# Patient Record
Sex: MALE
[De-identification: names, ages, dates, MRNs, and addresses within clinical notes are randomized per-mention and may not be internally consistent; named-entity substitution may affect disease eponyms.]

---

## 2023-02-03 ENCOUNTER — NURSE TRIAGE (OUTPATIENT)
Dept: OTHER | Facility: CLINIC | Age: 53
End: 2023-02-03

## 2023-02-03 NOTE — TELEPHONE ENCOUNTER
Location of patient: [address] call from [facility] at [facility] with [employer]. Patient wants to establish care at the [facility]. Subjective: Caller states \"I started with this weekend before last. It got worse Thursday. I dismissed it as a run of the mill cold. I have snot in my throat so while I'm talking I get a tickle and have to cough. Wed my gut started slowing down and I feel like I have a big lump in my stomach. Thursday that cleared out. I had a headache especially on Wed and sinus pressure. Sometimes I get some mucous. \"     Current Symptoms: \"worst cold/flu of my life\"    Onset: 10 days ago; rapid    Associated Symptoms:  sinus pressure/pain, cough (sometimes productive) , shortness of breath a couple of times at night last Wed., nasal drainage    Pain Severity: \"not too much today\"    Temperature: denies fever     What has been tried: theraflu, cold-eeze    LMP: NA Pregnant: NA    Recommended disposition: See in Office Today or Tomorrow    As patient is unestablished, advised if no appointment available to be evaluated at urgent care or walk-in clinic but to set up a new patient appointment with scheduling. Care advice provided, patient verbalizes understanding; denies any other questions or concerns; instructed to call back for any new or worsening symptoms. Patient/Caller agrees with recommended disposition; writer provided warm transfer to [facility] at [facility] for appointment scheduling    Attention Provider: Thank you for allowing me to participate in the care of your patient. The patient was connected to triage in response to information provided to the ECC/PSC. Please do not respond through this encounter as the response is not directed to a shared pool.     Reason for Disposition   Sinus congestion (pressure, fullness) present > 10 days    Protocols used: Sinus Pain or Congestion-ADULT-[place]

## 2023-02-07 ENCOUNTER — HOSPITAL ENCOUNTER (OUTPATIENT)
Dept: GENERAL RADIOLOGY | Facility: CLINIC | Age: 53
Discharge: HOME | End: 2023-02-09
Payer: COMMERCIAL

## 2023-02-07 ENCOUNTER — HOSPITAL ENCOUNTER (OUTPATIENT)
Facility: CLINIC | Age: 53
Discharge: HOME | End: 2023-02-09
Payer: COMMERCIAL

## 2023-02-07 ENCOUNTER — OFFICE VISIT (OUTPATIENT)
Dept: INTERNAL MEDICINE CLINIC | Age: 53
End: 2023-02-07
Payer: COMMERCIAL

## 2023-02-07 VITALS
SYSTOLIC BLOOD PRESSURE: 138 MMHG | OXYGEN SATURATION: 96 % | WEIGHT: 233 LBS | DIASTOLIC BLOOD PRESSURE: 86 MMHG | HEART RATE: 104 BPM

## 2023-02-07 DIAGNOSIS — R06.02 SOB (SHORTNESS OF BREATH): ICD-10-CM

## 2023-02-07 DIAGNOSIS — Z13.220 SCREENING FOR HYPERLIPIDEMIA: ICD-10-CM

## 2023-02-07 DIAGNOSIS — J01.90 ACUTE NON-RECURRENT SINUSITIS, UNSPECIFIED LOCATION: Primary | ICD-10-CM

## 2023-02-07 DIAGNOSIS — Z12.11 COLON CANCER SCREENING: ICD-10-CM

## 2023-02-07 PROCEDURE — G8421 BMI NOT CALCULATED: HCPCS | Performed by: INTERNAL MEDICINE

## 2023-02-07 PROCEDURE — 99204 OFFICE O/P NEW MOD 45 MIN: CPT | Performed by: INTERNAL MEDICINE

## 2023-02-07 PROCEDURE — 71046 X-RAY EXAM CHEST 2 VIEWS: CPT

## 2023-02-07 PROCEDURE — 3017F COLORECTAL CA SCREEN DOC REV: CPT | Performed by: INTERNAL MEDICINE

## 2023-02-07 PROCEDURE — G8484 FLU IMMUNIZE NO ADMIN: HCPCS | Performed by: INTERNAL MEDICINE

## 2023-02-07 PROCEDURE — G8427 DOCREV CUR MEDS BY ELIG CLIN: HCPCS | Performed by: INTERNAL MEDICINE

## 2023-02-07 PROCEDURE — 1036F TOBACCO NON-USER: CPT | Performed by: INTERNAL MEDICINE

## 2023-02-07 RX ORDER — AZITHROMYCIN 250 MG/1
TABLET, FILM COATED ORAL
Qty: 1 PACKET | Refills: 0 | Status: SHIPPED | OUTPATIENT
Start: 2023-02-07

## 2023-02-07 ASSESSMENT — ENCOUNTER SYMPTOMS
SINUS PRESSURE: 1
COUGH: 1
SHORTNESS OF BREATH: 1
SINUS COMPLAINT: 1
SPUTUM PRODUCTION: 1

## 2023-02-07 NOTE — PROGRESS NOTES
[address]  [place] [address]  Dept: [phone]  Dept Fax: [phone]    Jackelin Ochoa is a 46 y.o. male who presents today for his medicalconditions/complaints as noted below. Jackelin Ochoa is c/o of New Patient and Sinus Problem (With SOB, having sinus pressure started last year)      HPI:     Shortness of Breath  This is a new problem. The current episode started 1 to 4 weeks ago (about 10 days). The problem has been gradually worsening. Associated symptoms include sputum production. Nothing aggravates the symptoms. He has tried nothing for the symptoms. Sinus Problem  This is a new problem. The current episode started 1 to 4 weeks ago. The problem has been gradually worsening since onset. The pain is moderate. Associated symptoms include congestion, coughing (from tickle in throat occasionally productive), shortness of breath and sinus pressure (greater on left). Past treatments include oral decongestants (theraflu and mucinex). The treatment provided moderate relief. No past medical history on file. Current Outpatient Medications   Medication Sig Dispense Refill    azithromycin (ZITHROMAX) 250 MG tablet Take 2 tabs (500 mg) on Day 1, and take 1 tab (250 mg) on days 2 through 5. 1 packet 0     No current facility-administered medications for this visit.      Allergies   Allergen Reactions    Seasonal        Health Maintenance   Topic Date Due    Depression Screen  Never done    HIV screen  Never done    Hepatitis C screen  Never done    DTaP/Tdap/Td vaccine (1 - Tdap) Never done    Lipids  Never done    Colorectal Cancer Screen  Never done    Shingles vaccine (1 of 2) Never done    Flu vaccine (1) Never done    COVID-19 Vaccine  Completed    Hepatitis A vaccine  Aged Out    Hib vaccine  Aged Out    Meningococcal (ACWY) vaccine  Aged Out    Pneumococcal 0-64 years Vaccine  Aged Out       Subjective:      Review of Systems   HENT:  Positive for congestion and sinus pressure (greater on left). Respiratory:  Positive for cough (from tickle in throat occasionally productive), sputum production and shortness of breath. All other systems reviewed and are negative. Objective:     Physical Exam  Vitals reviewed. Constitutional:       Appearance: He is well-developed. HENT:      Head: Normocephalic and atraumatic. Right Ear: Tympanic membrane normal.      Left Ear: Tympanic membrane normal.      Mouth/Throat:      Mouth: Mucous membranes are moist.      Pharynx: Oropharynx is clear. No oropharyngeal exudate. Eyes:      Conjunctiva/sclera: Conjunctivae normal.      Pupils: Pupils are equal, round, and reactive to light. Neck:      Thyroid: No thyromegaly. Vascular: No JVD. Cardiovascular:      Rate and Rhythm: Normal rate and regular rhythm. Heart sounds: Normal heart sounds. No murmur heard. Pulmonary:      Effort: Pulmonary effort is normal.      Breath sounds: Normal breath sounds. Abdominal:      General: Bowel sounds are normal.      Palpations: Abdomen is soft. Musculoskeletal:         General: Normal range of motion. Cervical back: Normal range of motion and neck supple. Skin:     General: Skin is warm and dry. Neurological:      Mental Status: He is alert and oriented to person, place, and time. Deep Tendon Reflexes: Reflexes are normal and symmetric. /86 (Site: Right Upper Arm, Position: Sitting)   Pulse (!) 104   Wt 233 lb (105.7 kg)   SpO2 96%       Assessment:       Diagnosis Orders   1. Acute non-recurrent sinusitis, unspecified location        2. Screening for hyperlipidemia  Lipid Panel      3. SOB (shortness of breath)  azithromycin (ZITHROMAX) 250 MG tablet    XR CHEST (2 VW)      4. Colon cancer screening  FIT-DNA (Cologuard)          Plan:      No follow-ups on file.     Orders Placed This Encounter   Medications    azithromycin (ZITHROMAX) 250 MG tablet     Sig: Take 2 tabs (500 mg) on Day 1, and take 1 tab (250 mg) on days 2 through 5. Dispense:  1 packet     Refill:  0     Orders Placed This Encounter   Procedures    FIT-DNA (Cologuard)    XR CHEST (2 VW)     Standing Status:   Future     Standing Expiration Date:   2/7/2024    Lipid Panel     Standing Status:   Future     Standing Expiration Date:   2/7/2024     Order Specific Question:   Is Patient Fasting?/# of Hours     Answer:   No            Patient given educational materials - see patient instructions. Discussed use, benefit, and side effects of prescribed medications. All patientquestions answered. Pt voiced understanding.     Electronically signed by Tiffany Talbot MD on 2/7/2023at 10:06 AM

## 2023-02-07 NOTE — PROGRESS NOTES
Visit Information    Have you changed or started any medications since your last visit including any over-the-counter medicines, vitamins, or herbal medicines? no   Are you having any side effects from any of your medications? -  no  Have you stopped taking any of your medications? Is so, why? -  no    Have you seen any other physician or provider since your last visit? No  Have you had any other diagnostic tests since your last visit? No  Have you been seen in the emergency room and/or had an admission to a hospital since we last saw you? No  Have you had your routine dental cleaning in the past 6 months? no    Have you activated your [employer] account? If not, what are your barriers?  No:      Patient Care Team:  Rolanda Curry MD as PCP - General (Internal Medicine)    Medical History Review  Past Medical, Family, and Social History reviewed and does contribute to the patient presenting condition    Health Maintenance   Topic Date Due    Depression Screen  Never done    HIV screen  Never done    Hepatitis C screen  Never done    DTaP/Tdap/Td vaccine (1 - Tdap) Never done    Lipids  Never done    Colorectal Cancer Screen  Never done    Shingles vaccine (1 of 2) Never done    Flu vaccine (1) Never done    COVID-19 Vaccine  Completed    Hepatitis A vaccine  Aged Out    Hib vaccine  Aged Out    Meningococcal (ACWY) vaccine  Aged Out    Pneumococcal 0-64 years Vaccine  Aged Out

## 2023-02-08 DIAGNOSIS — R93.89 ABNORMAL CXR: Primary | ICD-10-CM

## 2023-02-13 ENCOUNTER — HOSPITAL ENCOUNTER (OUTPATIENT)
Dept: GENERAL RADIOLOGY | Facility: CLINIC | Age: 53
Discharge: HOME | DRG: 176 | End: 2023-02-15
Payer: COMMERCIAL

## 2023-02-13 ENCOUNTER — TELEPHONE (OUTPATIENT)
Dept: INTERNAL MEDICINE CLINIC | Age: 53
End: 2023-02-13

## 2023-02-13 ENCOUNTER — HOSPITAL ENCOUNTER (OUTPATIENT)
Facility: CLINIC | Age: 53
Discharge: HOME | DRG: 176 | End: 2023-02-15
Payer: COMMERCIAL

## 2023-02-13 DIAGNOSIS — R06.02 SOB (SHORTNESS OF BREATH): ICD-10-CM

## 2023-02-13 DIAGNOSIS — R06.02 SOB (SHORTNESS OF BREATH): Primary | ICD-10-CM

## 2023-02-13 PROCEDURE — 71046 X-RAY EXAM CHEST 2 VIEWS: CPT

## 2023-02-13 NOTE — TELEPHONE ENCOUNTER
Patient was seen last week for a cold and had a chest x-ray done. He was told to get repeat chest x-ray done in 8 weeks. Patient is stating he is very short of breath when he does things. He would like to know if he can have the x-ray done now. Or what would you like him to do.

## 2023-02-14 ENCOUNTER — APPOINTMENT (OUTPATIENT)
Dept: CT IMAGING | Age: 53
DRG: 176 | End: 2023-02-14
Payer: COMMERCIAL

## 2023-02-14 ENCOUNTER — APPOINTMENT (OUTPATIENT)
Dept: NON INVASIVE DIAGNOSTICS | Age: 53
DRG: 176 | End: 2023-02-14
Payer: COMMERCIAL

## 2023-02-14 ENCOUNTER — APPOINTMENT (OUTPATIENT)
Dept: VASCULAR LAB | Age: 53
DRG: 176 | End: 2023-02-14
Payer: COMMERCIAL

## 2023-02-14 ENCOUNTER — HOSPITAL ENCOUNTER (INPATIENT)
Age: 53
LOS: 1 days | Discharge: HOME | DRG: 176 | End: 2023-02-15
Attending: EMERGENCY MEDICINE | Admitting: INTERNAL MEDICINE
Payer: COMMERCIAL

## 2023-02-14 ENCOUNTER — APPOINTMENT (OUTPATIENT)
Dept: GENERAL RADIOLOGY | Age: 53
DRG: 176 | End: 2023-02-14
Payer: COMMERCIAL

## 2023-02-14 DIAGNOSIS — I82.431 ACUTE DEEP VEIN THROMBOSIS (DVT) OF POPLITEAL VEIN OF RIGHT LOWER EXTREMITY (HCC): ICD-10-CM

## 2023-02-14 DIAGNOSIS — I26.94 MULTIPLE SUBSEGMENTAL PULMONARY EMBOLI WITHOUT ACUTE COR PULMONALE (HCC): Primary | ICD-10-CM

## 2023-02-14 PROBLEM — I26.99 PULMONARY EMBOLISM WITHOUT ACUTE COR PULMONALE, UNSPECIFIED CHRONICITY, UNSPECIFIED PULMONARY EMBOLISM TYPE (HCC): Status: ACTIVE | Noted: 2023-02-14

## 2023-02-14 LAB
ABSOLUTE EOS #: 0 K/UL (ref 0–0.4)
ABSOLUTE LYMPH #: 5.01 K/UL (ref 1–4.8)
ABSOLUTE MONO #: 0.64 K/UL (ref 0.1–1.3)
ANION GAP SERPL CALCULATED.3IONS-SCNC: 10 MMOL/L (ref 9–17)
ATYPICAL LYMPHOCYTE ABSOLUTE COUNT: 0.36 K/UL
ATYPICAL LYMPHOCYTES: 4 %
BACTERIA: NORMAL
BASOPHILS # BLD: 2 % (ref 0–2)
BASOPHILS ABSOLUTE: 0.18 K/UL (ref 0–0.2)
BILIRUBIN URINE: NEGATIVE
BNP SERPL-MCNC: 65 PG/ML
BUN SERPL-MCNC: 11 MG/DL (ref 6–20)
CALCIUM SERPL-MCNC: 8.7 MG/DL (ref 8.6–10.4)
CASTS UA: NORMAL /LPF
CHLORIDE SERPL-SCNC: 96 MMOL/L (ref 98–107)
CO2 SERPL-SCNC: 25 MMOL/L (ref 20–31)
COLOR: YELLOW
CREAT SERPL-MCNC: 0.73 MG/DL (ref 0.7–1.2)
D DIMER BLD IA.RAPID-MCNC: 10.15 MG/L FEU (ref 0–0.59)
EKG ATRIAL RATE: 84 BPM
EKG P AXIS: 33 DEGREES
EKG P-R INTERVAL: 154 MS
EKG Q-T INTERVAL: 374 MS
EKG QRS DURATION: 78 MS
EKG QTC CALCULATION (BAZETT): 441 MS
EKG R AXIS: 36 DEGREES
EKG T AXIS: 42 DEGREES
EKG VENTRICULAR RATE: 84 BPM
EOSINOPHILS RELATIVE PERCENT: 0 % (ref 0–4)
EPITHELIAL CELLS UA: NORMAL /HPF
FLUAV RNA RESP QL NAA+PROBE: NOT DETECTED
FLUBV RNA RESP QL NAA+PROBE: NOT DETECTED
GFR SERPL CREATININE-BSD FRML MDRD: >60 ML/MIN/1.73M2
GLUCOSE SERPL-MCNC: 121 MG/DL (ref 70–99)
GLUCOSE UR STRIP.AUTO-MCNC: NEGATIVE MG/DL
HCT VFR BLD AUTO: 41.1 % (ref 41–53)
HGB BLD-MCNC: 13.9 G/DL (ref 13.5–17.5)
INR PPP: 1
KETONES UR STRIP.AUTO-MCNC: NEGATIVE MG/DL
LEUKOCYTE ESTERASE UR QL STRIP.AUTO: NEGATIVE
LV EF: 55 %
LVEF MODALITY: NORMAL
LYMPHOCYTES # BLD: 55 % (ref 24–44)
MCH RBC QN AUTO: 30.3 PG (ref 26–34)
MCHC RBC AUTO-ENTMCNC: 33.9 G/DL (ref 31–37)
MCV RBC AUTO: 89.5 FL (ref 80–100)
MONOCYTES # BLD: 7 % (ref 1–7)
MORPHOLOGY: ABNORMAL
MYOGLOBIN SERPL-MCNC: 86 NG/ML (ref 28–72)
MYOGLOBIN SERPL-MCNC: 97 NG/ML (ref 28–72)
NITRITE UR QL STRIP.AUTO: NEGATIVE
PARTIAL THROMBOPLASTIN TIME: 27.7 SEC (ref 24–36)
PDW BLD-RTO: 15 % (ref 11.5–14.9)
PLATELET # BLD AUTO: 299 K/UL (ref 150–450)
PMV BLD AUTO: 6.5 FL (ref 6–12)
POTASSIUM SERPL-SCNC: 4.5 MMOL/L (ref 3.7–5.3)
PROT UR STRIP.AUTO-MCNC: 7 MG/DL (ref 5–8)
PROT UR STRIP.AUTO-MCNC: ABNORMAL MG/DL
PROTHROMBIN TIME: 13 SEC (ref 11.8–14.6)
RBC # BLD: 4.59 M/UL (ref 4.5–5.9)
RBC CLUMPS #/AREA URNS AUTO: NORMAL /HPF
SARS-COV-2 RNA RESP QL NAA+PROBE: NOT DETECTED
SEG NEUTROPHILS: 32 % (ref 36–66)
SEGMENTED NEUTROPHILS ABSOLUTE COUNT: 2.91 K/UL (ref 1.3–9.1)
SODIUM SERPL-SCNC: 131 MMOL/L (ref 135–144)
SOURCE: NORMAL
SPECIFIC GRAVITY UA: 1.01 (ref 1–1.03)
SPECIMEN DESCRIPTION: NORMAL
T4 FREE SERPL-MCNC: 1.07 NG/DL (ref 0.93–1.7)
TROPONIN I SERPL DL<=0.01 NG/ML-MCNC: 10 NG/L (ref 0–22)
TROPONIN I SERPL DL<=0.01 NG/ML-MCNC: 11 NG/L (ref 0–22)
TSH SERPL-ACNC: 3.8 UIU/ML (ref 0.3–5)
TURBIDITY: CLEAR
URINE HGB: NEGATIVE
UROBILINOGEN, URINE: NORMAL
WBC # BLD AUTO: 9.1 K/UL (ref 3.5–11)
WBC UA: NORMAL /HPF

## 2023-02-14 PROCEDURE — 80048 BASIC METABOLIC PNL TOTAL CA: CPT

## 2023-02-14 PROCEDURE — 36415 COLL VENOUS BLD VENIPUNCTURE: CPT

## 2023-02-14 PROCEDURE — 85379 FIBRIN DEGRADATION QUANT: CPT

## 2023-02-14 PROCEDURE — 93970 EXTREMITY STUDY: CPT

## 2023-02-14 PROCEDURE — 93306 TTE W/DOPPLER COMPLETE: CPT

## 2023-02-14 PROCEDURE — 71260 CT THORAX DX C+: CPT | Performed by: EMERGENCY MEDICINE

## 2023-02-14 PROCEDURE — 83880 ASSAY OF NATRIURETIC PEPTIDE: CPT

## 2023-02-14 PROCEDURE — 83874 ASSAY OF MYOGLOBIN: CPT

## 2023-02-14 PROCEDURE — 87636 SARSCOV2 & INF A&B AMP PRB: CPT

## 2023-02-14 PROCEDURE — 85025 COMPLETE CBC W/AUTO DIFF WBC: CPT

## 2023-02-14 PROCEDURE — 93010 ELECTROCARDIOGRAM REPORT: CPT | Performed by: INTERNAL MEDICINE

## 2023-02-14 PROCEDURE — 85610 PROTHROMBIN TIME: CPT

## 2023-02-14 PROCEDURE — 99285 EMERGENCY DEPT VISIT HI MDM: CPT

## 2023-02-14 PROCEDURE — 6360000002 HC RX W HCPCS: Performed by: EMERGENCY MEDICINE

## 2023-02-14 PROCEDURE — 2580000003 HC RX 258: Performed by: EMERGENCY MEDICINE

## 2023-02-14 PROCEDURE — 81001 URINALYSIS AUTO W/SCOPE: CPT

## 2023-02-14 PROCEDURE — 85730 THROMBOPLASTIN TIME PARTIAL: CPT

## 2023-02-14 PROCEDURE — 1200000000 HC SEMI PRIVATE

## 2023-02-14 PROCEDURE — 6360000004 HC RX CONTRAST MEDICATION: Performed by: EMERGENCY MEDICINE

## 2023-02-14 PROCEDURE — 6370000000 HC RX 637 (ALT 250 FOR IP)

## 2023-02-14 PROCEDURE — 84443 ASSAY THYROID STIM HORMONE: CPT

## 2023-02-14 PROCEDURE — 93005 ELECTROCARDIOGRAM TRACING: CPT | Performed by: EMERGENCY MEDICINE

## 2023-02-14 PROCEDURE — 2580000003 HC RX 258: Performed by: INTERNAL MEDICINE

## 2023-02-14 PROCEDURE — 84439 ASSAY OF FREE THYROXINE: CPT

## 2023-02-14 PROCEDURE — 71045 X-RAY EXAM CHEST 1 VIEW: CPT

## 2023-02-14 PROCEDURE — 84484 ASSAY OF TROPONIN QUANT: CPT

## 2023-02-14 RX ORDER — MAGNESIUM SULFATE 1 G/100ML
1000 INJECTION INTRAVENOUS PRN
Status: DISCONTINUED | OUTPATIENT
Start: 2023-02-14 | End: 2023-02-15 | Stop reason: HOSPADM

## 2023-02-14 RX ORDER — SODIUM CHLORIDE 0.9 % (FLUSH) 0.9 %
10 SYRINGE (ML) INJECTION PRN
Status: DISCONTINUED | OUTPATIENT
Start: 2023-02-14 | End: 2023-02-15 | Stop reason: HOSPADM

## 2023-02-14 RX ORDER — GUAIFENESIN 600 MG/1
600 TABLET, EXTENDED RELEASE ORAL 2 TIMES DAILY PRN
COMMUNITY

## 2023-02-14 RX ORDER — SODIUM CHLORIDE 9 MG/ML
INJECTION, SOLUTION INTRAVENOUS PRN
Status: DISCONTINUED | OUTPATIENT
Start: 2023-02-14 | End: 2023-02-15 | Stop reason: HOSPADM

## 2023-02-14 RX ORDER — ENOXAPARIN SODIUM 150 MG/ML
1 INJECTION SUBCUTANEOUS ONCE
Status: COMPLETED | OUTPATIENT
Start: 2023-02-14 | End: 2023-02-14

## 2023-02-14 RX ORDER — ACETAMINOPHEN 650 MG/1
650 SUPPOSITORY RECTAL EVERY 6 HOURS PRN
Status: DISCONTINUED | OUTPATIENT
Start: 2023-02-14 | End: 2023-02-15 | Stop reason: HOSPADM

## 2023-02-14 RX ORDER — ACETAMINOPHEN 325 MG/1
650 TABLET ORAL EVERY 6 HOURS PRN
Status: DISCONTINUED | OUTPATIENT
Start: 2023-02-14 | End: 2023-02-15 | Stop reason: HOSPADM

## 2023-02-14 RX ORDER — ONDANSETRON 2 MG/ML
4 INJECTION INTRAMUSCULAR; INTRAVENOUS EVERY 6 HOURS PRN
Status: DISCONTINUED | OUTPATIENT
Start: 2023-02-14 | End: 2023-02-15 | Stop reason: HOSPADM

## 2023-02-14 RX ORDER — POTASSIUM CHLORIDE 7.45 MG/ML
10 INJECTION INTRAVENOUS PRN
Status: DISCONTINUED | OUTPATIENT
Start: 2023-02-14 | End: 2023-02-15 | Stop reason: HOSPADM

## 2023-02-14 RX ORDER — 0.9 % SODIUM CHLORIDE 0.9 %
100 INTRAVENOUS SOLUTION INTRAVENOUS ONCE
Status: COMPLETED | OUTPATIENT
Start: 2023-02-14 | End: 2023-02-14

## 2023-02-14 RX ORDER — ENOXAPARIN SODIUM 150 MG/ML
1 INJECTION SUBCUTANEOUS 2 TIMES DAILY
Status: DISCONTINUED | OUTPATIENT
Start: 2023-02-14 | End: 2023-02-14 | Stop reason: ALTCHOICE

## 2023-02-14 RX ORDER — M-VIT,TX,IRON,MINS/CALC/FOLIC 27MG-0.4MG
1 TABLET ORAL DAILY
COMMUNITY

## 2023-02-14 RX ORDER — POTASSIUM CHLORIDE 20 MEQ/1
40 TABLET, EXTENDED RELEASE ORAL PRN
Status: DISCONTINUED | OUTPATIENT
Start: 2023-02-14 | End: 2023-02-15 | Stop reason: HOSPADM

## 2023-02-14 RX ORDER — ONDANSETRON 4 MG/1
4 TABLET, ORALLY DISINTEGRATING ORAL EVERY 8 HOURS PRN
Status: DISCONTINUED | OUTPATIENT
Start: 2023-02-14 | End: 2023-02-15 | Stop reason: HOSPADM

## 2023-02-14 RX ORDER — POLYETHYLENE GLYCOL 3350 17 G/17G
17 POWDER, FOR SOLUTION ORAL DAILY PRN
Status: DISCONTINUED | OUTPATIENT
Start: 2023-02-14 | End: 2023-02-15 | Stop reason: HOSPADM

## 2023-02-14 RX ORDER — SODIUM CHLORIDE 0.9 % (FLUSH) 0.9 %
5-40 SYRINGE (ML) INJECTION EVERY 12 HOURS SCHEDULED
Status: DISCONTINUED | OUTPATIENT
Start: 2023-02-14 | End: 2023-02-15 | Stop reason: HOSPADM

## 2023-02-14 RX ORDER — 0.9 % SODIUM CHLORIDE 0.9 %
1000 INTRAVENOUS SOLUTION INTRAVENOUS ONCE
Status: COMPLETED | OUTPATIENT
Start: 2023-02-14 | End: 2023-02-14

## 2023-02-14 RX ADMIN — ENOXAPARIN SODIUM 105 MG: 150 INJECTION SUBCUTANEOUS at 10:42

## 2023-02-14 RX ADMIN — SODIUM CHLORIDE 100 ML: 9 INJECTION, SOLUTION INTRAVENOUS at 09:30

## 2023-02-14 RX ADMIN — SODIUM CHLORIDE, PRESERVATIVE FREE 10 ML: 5 INJECTION INTRAVENOUS at 20:09

## 2023-02-14 RX ADMIN — APIXABAN 10 MG: 5 TABLET, FILM COATED ORAL at 20:08

## 2023-02-14 RX ADMIN — SODIUM CHLORIDE, PRESERVATIVE FREE 10 ML: 5 INJECTION INTRAVENOUS at 09:30

## 2023-02-14 RX ADMIN — IOPAMIDOL 75 ML: 755 INJECTION, SOLUTION INTRAVENOUS at 09:29

## 2023-02-14 RX ADMIN — SODIUM CHLORIDE 1000 ML: 9 INJECTION, SOLUTION INTRAVENOUS at 08:36

## 2023-02-14 ASSESSMENT — ENCOUNTER SYMPTOMS
TROUBLE SWALLOWING: 0
CONSTIPATION: 0
SINUS PRESSURE: 0
ABDOMINAL PAIN: 0
WHEEZING: 0
BACK PAIN: 0
BLOOD IN STOOL: 0
COLOR CHANGE: 0
VOMITING: 0
EYE REDNESS: 0
CHEST TIGHTNESS: 0
SHORTNESS OF BREATH: 1
FACIAL SWELLING: 0
DIARRHEA: 0
EYE DISCHARGE: 0
SORE THROAT: 0
COUGH: 0
SHORTNESS OF BREATH: 0
RHINORRHEA: 0
NAUSEA: 1
EYE PAIN: 0

## 2023-02-14 ASSESSMENT — PAIN - FUNCTIONAL ASSESSMENT: PAIN_FUNCTIONAL_ASSESSMENT: 0-10

## 2023-02-14 ASSESSMENT — PAIN SCALES - GENERAL: PAINLEVEL_OUTOF10: 1

## 2023-02-14 NOTE — ED NOTES
Report given to Diana Mcneill RN from Med/Surg. Report method by phone   The following was reviewed with receiving RN:   Current vital signs:  /88   Pulse 88   Temp 97.3 °F (36.3 °C) (Oral)   Resp 28   Ht 5' 10\" (1.778 m)   Wt 233 lb (105.7 kg)   SpO2 93%   BMI 33.43 kg/m²                MEWS Score: 1     Any medication or safety alerts were reviewed. Any pending diagnostics and notifications were also reviewed, as well as any safety concerns or issues, abnormal labs, abnormal imaging, and abnormal assessment findings. Questions were answered.             Desiree Prader, RN  02/14/23 [id number]

## 2023-02-14 NOTE — PROGRESS NOTES
Medication History completed:    New medications: guaifenesin ER, multivitamin    Medications discontinued:  Azithromycin - course completed on 2/12/23    Medications flagged for review: none    Changes to dosing: none    Stated allergies: NKDA    Other pertinent information: Medications confirmed with patient. He denies routine prescription medications.      Thank you,  Johnson Zhou, PharmD, BCPS  [phone]

## 2023-02-14 NOTE — H&P
[address]      [address] [facility]     HISTORY AND PHYSICAL EXAMINATION            Date:   2/14/2023  Patient name:  Jake Fitzgerald  Date of admission:  2/14/2023  7:33 AM  MRN:   [id number]  Account:  [de-identified]  YOB: 1970  PCP:    Sean Bauer MD  Room:   2051/2051-01  Code Status:    Full Code    Chief Complaint:     Chief Complaint   Patient presents with    Nausea     X1 week intermittently      Shortness of Breath     x3 weeks         History Obtained From:     patient    History of Present Illness: The patient is a 80-year-old male with no significant past medical history presenting today for shortness of breath that has been ongoing for the last 3 weeks. Patient stated that he has been having worsening dyspnea. Denies any chest pain. States that he has had episodes where he wakes up from sleep gasping for air. Had to take off work due to worsening dyspnea. Has not seen a PCP in 20 years. He is a non-smoker. Denies any family history of blood clots. No recent history of travel or any surgeries. Denies any current comorbidities. Patient states that he was having worsening congestion and went to outpatient clinic and was given a Z-Jose F. His symptoms continue to worsen, and this morning at 3 AM patient became really diaphoretic and had an episode of vomiting, with worsening dyspnea. In the ED patient was vitally stable. CT of the chest showed bilateral acute PE with no heart strain. As well as right-sided popliteal DVT. Patient was given Lovenox in the ED. Will be started on Eliquis tonight and will have an echo ordered. EKG shows sinus rhythm. Patient is being admitted for the management of PE and right-sided popliteal DVT. Past Medical History:     History reviewed. No pertinent past medical history.      Past SurgicalHistory: History reviewed. No pertinent surgical history. Medications Prior to Admission:        Prior to Admission medications    Medication Sig Start Date End Date Taking? Authorizing Provider   guaiFENesin (MUCINEX) 600 MG extended release tablet Take 600 mg by mouth 2 times daily as needed for Congestion   Yes Historical Provider, MD   Multiple Vitamins-Minerals (THERAPEUTIC MULTIVITAMIN-MINERALS) tablet Take 1 tablet by mouth daily   Yes Historical Provider, MD        Allergies:     Seasonal    Social History:     Tobacco:    reports that he has never smoked. He has never used smokeless tobacco.  Alcohol:      reports that he does not currently use alcohol. Drug Use:  reports that he does not currently use drugs after having used the following drugs: Marijuana Dominique Ambtiarra). Family History:     History reviewed. No pertinent family history. Review of Systems:     Positive and Negative as described in HPI. Review of Systems   Constitutional:  Negative for chills and fever. HENT:  Negative for rhinorrhea and sore throat. Eyes:  Negative for visual disturbance. Respiratory:  Negative for cough and shortness of breath. Cardiovascular:  Negative for chest pain and leg swelling. Gastrointestinal:  Positive for nausea. Negative for abdominal pain and diarrhea. Genitourinary:  Negative for dysuria and hematuria. Musculoskeletal:  Negative for back pain. Skin:  Negative for rash. Neurological:  Positive for headaches. Negative for numbness. Psychiatric/Behavioral:  Negative for agitation. Physical Exam:   BP (!) 133/95   Pulse 88   Temp 97.7 °F (36.5 °C) (Oral)   Resp 20   Ht 5' 10\" (1.778 m)   Wt 233 lb (105.7 kg)   SpO2 97%   BMI 33.43 kg/m²   Temp (24hrs), Av.5 °F (36.4 °C), Min:97.3 °F (36.3 °C), Max:97.7 °F (36.5 °C)    No results for input(s): POCGLU in the last 72 hours.     Intake/Output Summary (Last 24 hours) at 2023 1408  Last data filed at 2023 1043  Gross per 24 hour   Intake 1100 ml   Output --   Net 1100 ml       Physical Exam  Vitals reviewed. Constitutional:       General: He is not in acute distress. Appearance: Normal appearance. Cardiovascular:      Rate and Rhythm: Normal rate and regular rhythm. Pulses: Normal pulses. Heart sounds: Normal heart sounds. No murmur heard. Pulmonary:      Effort: Pulmonary effort is normal.      Breath sounds: Normal breath sounds. Abdominal:      General: Bowel sounds are normal.      Palpations: Abdomen is soft. Tenderness: There is no abdominal tenderness. Musculoskeletal:      Cervical back: Normal range of motion. Right lower leg: No edema. Left lower leg: No edema. Neurological:      Mental Status: He is alert.    Psychiatric:         Mood and Affect: Mood normal.       Investigations:     Laboratory Testing:  Recent Results (from the past 24 hour(s))   Basic Metabolic Panel    Collection Time: 02/14/23  8:04 AM   Result Value Ref Range    Glucose 121 (H) 70 - 99 mg/dL    BUN 11 6 - 20 mg/dL    Creatinine 0.73 0.70 - 1.20 mg/dL    Est, Glom Filt Rate >60 >60 mL/min/1.73m2    Calcium 8.7 8.6 - 10.4 mg/dL    Sodium 131 (L) 135 - 144 mmol/L    Potassium 4.5 3.7 - 5.3 mmol/L    Chloride 96 (L) 98 - 107 mmol/L    CO2 25 20 - 31 mmol/L    Anion Gap 10 9 - 17 mmol/L   Brain Natriuretic Peptide    Collection Time: 02/14/23  8:04 AM   Result Value Ref Range    Pro-BNP 65 <300 pg/mL   CBC with Auto Differential    Collection Time: 02/14/23  8:04 AM   Result Value Ref Range    WBC 9.1 3.5 - 11.0 k/uL    RBC 4.59 4.5 - 5.9 m/uL    Hemoglobin 13.9 13.5 - 17.5 g/dL    Hematocrit 41.1 41 - 53 %    MCV 89.5 80 - 100 fL    MCH 30.3 26 - 34 pg    MCHC 33.9 31 - 37 g/dL    RDW 15.0 (H) 11.5 - 14.9 %    Platelets 488 874 - 597 k/uL    MPV 6.5 6.0 - 12.0 fL    Seg Neutrophils 32 (L) 36 - 66 %    Lymphocytes 55 (H) 24 - 44 %    Atypical Lymphocytes 4 %    Monocytes 7 1 - 7 %    Eosinophils % 0 0 - 4 % Basophils 2 0 - 2 %    Segs Absolute 2.91 1.3 - 9.1 k/uL    Absolute Lymph # 5.01 (H) 1.0 - 4.8 k/uL    Atypical Lymphocytes Absolute 0.36 k/uL    Absolute Mono # 0.64 0.1 - 1.3 k/uL    Absolute Eos # 0.00 0.0 - 0.4 k/uL    Basophils Absolute 0.18 0.0 - 0.2 k/uL    Morphology ANISOCYTOSIS PRESENT     Morphology MICROCYTOSIS PRESENT     Morphology 1+ TEARDROPS     Morphology 1+ ELLIPTOCYTES    D-Dimer, Quantitative    Collection Time: 02/14/23  8:04 AM   Result Value Ref Range    D-Dimer, Quant 10.15 (H) 0.00 - 0.59 mg/L FEU   TROP/MYOGLOBIN    Collection Time: 02/14/23  8:04 AM   Result Value Ref Range    Troponin, High Sensitivity 11 0 - 22 ng/L    Myoglobin 97 (H) 28 - 72 ng/mL   TSH    Collection Time: 02/14/23  8:04 AM   Result Value Ref Range    TSH 3.80 0.30 - 5.00 uIU/mL   T4, Free    Collection Time: 02/14/23  8:04 AM   Result Value Ref Range    Thyroxine, Free 1.07 0.93 - 1.70 ng/dL   APTT    Collection Time: 02/14/23  8:04 AM   Result Value Ref Range    PTT 27.7 24.0 - 36.0 sec   Protime-INR    Collection Time: 02/14/23  8:04 AM   Result Value Ref Range    Protime 13.0 11.8 - 14.6 sec    INR 1.0    EKG 12 Lead    Collection Time: 02/14/23  8:04 AM   Result Value Ref Range    Ventricular Rate 84 BPM    Atrial Rate 84 BPM    P-R Interval 154 ms    QRS Duration 78 ms    Q-T Interval 374 ms    QTc Calculation (Bazett) 441 ms    P Axis 33 degrees    R Axis 36 degrees    T Axis 42 degrees   COVID-19 & Influenza Combo    Collection Time: 02/14/23  8:09 AM    Specimen: Nasopharyngeal Swab   Result Value Ref Range    Specimen Description . NASOPHARYNGEAL SWAB     Source . NASOPHARYNGEAL SWAB     SARS-CoV-2 RNA, RT PCR Not Detected Not Detected    INFLUENZA A Not Detected Not Detected    INFLUENZA B Not Detected Not Detected   Urinalysis with Reflex to Culture    Collection Time: 02/14/23  8:31 AM    Specimen: Urine, clean catch   Result Value Ref Range    Color, UA Yellow Yellow    Turbidity UA Clear Clear Glucose, Ur NEGATIVE NEGATIVE    Bilirubin Urine NEGATIVE NEGATIVE    Ketones, Urine NEGATIVE NEGATIVE    Specific Gravity, UA 1.010 1.000 - 1.030    Urine Hgb NEGATIVE NEGATIVE    pH, UA 7.0 5.0 - 8.0    Protein, UA TRACE (A) NEGATIVE    Urobilinogen, Urine Normal Normal    Nitrite, Urine NEGATIVE NEGATIVE    Leukocyte Esterase, Urine NEGATIVE NEGATIVE   Microscopic Urinalysis    Collection Time: 02/14/23  8:31 AM   Result Value Ref Range    WBC, UA 0 TO 2 /HPF    RBC, UA 0 TO 2 /HPF    Casts UA 0 TO 2 /LPF    Epithelial Cells UA 0 TO 2 /HPF    Bacteria, UA None None   EKG 12 Lead    Collection Time: 02/14/23 10:01 AM   Result Value Ref Range    Ventricular Rate 94 BPM    Atrial Rate 94 BPM    P-R Interval 150 ms    QRS Duration 82 ms    Q-T Interval 374 ms    QTc Calculation (Bazett) 467 ms    P Axis 33 degrees    R Axis 31 degrees    T Axis 34 degrees   TROP/MYOGLOBIN    Collection Time: 02/14/23 10:02 AM   Result Value Ref Range    Troponin, High Sensitivity 10 0 - 22 ng/L    Myoglobin 86 (H) 28 - 72 ng/mL       Imaging/Diagnostics:  XR CHEST (2 VW)    Result Date: 2/13/2023  EXAMINATION: TWO XRAY VIEWS OF THE CHEST 2/13/2023 11:23 am COMPARISON: February 7, 2023 HISTORY: ORDERING SYSTEM PROVIDED HISTORY: SOB (shortness of breath) TECHNOLOGIST PROVIDED HISTORY: Reason for Exam: patient c/o sob and states he is on antibiotics but not getting any better FINDINGS: The lungs are without acute focal process. There is no effusion or pneumothorax. The cardiomediastinal silhouette is without acute process. The osseous structures are without acute process. No acute process. XR CHEST (2 VW)    Result Date: 2/7/2023  EXAMINATION: TWO XRAY VIEWS OF THE CHEST 2/7/2023 11:03 am COMPARISON: None. HISTORY: ORDERING SYSTEM PROVIDED HISTORY: SOB (shortness of breath) TECHNOLOGIST PROVIDED HISTORY: Reason for Exam: pt stated SOB , cough x few wks FINDINGS: Normal cardiomediastinal silhouette.   Subtle patchy bibasilar opacities more apparent at the left costophrenic angle on the PA view. No discrete area of consolidation no pleural effusion. No pneumothorax. No acute bony abnormality. Mild patchy bibasilar opacities more apparent at the left costophrenic angle. 6-8 week follow-up may be considered. XR CHEST PORTABLE    Result Date: 2/14/2023  EXAMINATION: ONE XRAY VIEW OF THE CHEST 2/14/2023 8:06 am COMPARISON: Chest radiographs 02/13/2023, 02/07/2023 HISTORY: ORDERING SYSTEM PROVIDED HISTORY: SOB TECHNOLOGIST PROVIDED HISTORY: SOB FINDINGS: AP upright Lines/tubes: None. Mediastinum: No mediastinal widening or shift or cardiomegaly. Lungs/pleura: Low lung volumes. Right greater than left basilar pulmonary opacity. No pneumothorax or large pleural effusion. Bones: No acute findings. Mild right greater than left basilar pulmonary opacities on a background of low lung volumes likely representing subsegmental atelectasis. These opacities have increased on the right since yesterday. CT CHEST PULMONARY EMBOLISM W CONTRAST    Result Date: 2/14/2023  EXAMINATION: CTA OF THE CHEST 2/14/2023 9:26 am TECHNIQUE: CTA of the chest was performed after the administration of intravenous contrast.  Multiplanar reformatted images are provided for review. MIP images are provided for review. Automated exposure control, iterative reconstruction, and/or weight based adjustment of the mA/kV was utilized to reduce the radiation dose to as low as reasonably achievable. COMPARISON: None.  HISTORY: ORDERING SYSTEM PROVIDED HISTORY: SOB, elevated d-dimer TECHNOLOGIST PROVIDED HISTORY: SOB, elevated d-dimer Decision Support Exception - unselect if not a suspected or confirmed emergency medical condition->Emergency Medical Condition (MA) Reason for Exam: Shortness of breath, elevated d-dimer Additional signs and symptoms: Pt c/o shortness of breath, nausea, poor appetite, fatigue x 2 weeks Relevant Medical/Surgical History: No hx diabetes, cancer or surgery to area of interest FINDINGS: Limited by large amount of streak artifacts. Pulmonary Arteries: There are filling defects seen right lower lobe and middle lobe pulmonary arterial branches compatible with acute pulmonary embolism. Similar defects in subsegmental left lower lobe branches and also in the lingular branch. Minimal in bilateral upper lobe subsegmental branches. No right heart strain. Pulmonary trunk normal size. Mediastinum: A few scattered mediastinal lymph nodes. Largest 13 mm short axis along side the aortic arch. Thyroid gland and esophagus grossly normal. Cardiac size normal.  Normal caliber aorta. Lungs/pleura: Limited by motion artifact. Patchy subsegmental atelectasis in the posterior lung bases. Minimal in the anterior right lung base. No suspicious lung mass. No significant nodules. No pleural effusion or pneumothorax. Upper Abdomen: Limited images of the upper abdomen show no acute process. Soft Tissues/Bones: No acute bone or soft tissue abnormality. 1. Bilateral acute pulmonary emboli most pronounced in the right lower lobe branches. Mild to moderate thrombus load. No right heart strain. 2..  At lung bases. Findings were discussed with Ab Riojas at 9:46 a.m. on 2/14/2023.        Assessment :      Primary Problem  Pulmonary embolism without acute cor pulmonale, unspecified chronicity, unspecified pulmonary embolism type [facility])    Active Hospital Problems    Diagnosis Date Noted    Pulmonary embolism without acute cor pulmonale, unspecified chronicity, unspecified pulmonary embolism type ([facility] [address] [I26.99] 02/14/2023     Priority: Medium       Plan:     Patient status Admit as inpatient in the  Med/Surge    Bilateral PE with no right heart strain as well as right-sided popliteal DVT  - CT chest showed bilateral acute pulmonary embolism in the right lower lobe branches as well as popliteal DVT  - D-dimer elevated at 10.15  - Troponins are normal on admission  - EKG on admission showed normal sinus rhythm  - Chest x-ray on admission showed segmental atelectasis  - Patient given 1 dose of Lovenox in the ED  - Will be switched to Eliquis 10 mg twice daily for for 7 days  - Ordered echo  - Patient currently on room air, all vital stable      Consultations:   [address] [facility] PROVIDER    Patient is admitted as inpatient status because of co-morbiditieslisted above, severity of signs and symptoms as outlined, requirement for current medical therapies and most importantly because of direct risk to patient if care not provided in a hospital setting.     Jenny Anguiano DO  2/14/2023  2:08 PM    Copy sent to Dr. Lasha Echeverria MD

## 2023-02-14 NOTE — LETTER
[address] [facility] [address]  Phone: [phone]    Piotr Harris       February 15, 2023     Patient: Maria Fernanda Ramos   YOB: 1970   Date of Visit: 2/14/2023       To Whom It May Concern:     Aureliano Liao was in the hospital and was admitted on 2/14/2023. He can return to work 2/20/2023 . If you have any questions or concerns, please don't hesitate to call.     Sincerely,      Piotr Harris D.O

## 2023-02-14 NOTE — ED PROVIDER NOTES
[facility]  eMERGENCY dEPARTMENT eNCOUnter      Pt Name: Mansi Bassett  MRN: [id number]  [place] 1970  Date of evaluation: 2/14/23      CHIEF COMPLAINT       Chief Complaint   Patient presents with    Nausea     X1 week intermittently      Shortness of Breath     x3 weeks           HISTORY OF PRESENT ILLNESS    Mansi Bassett is a 46 y.o. male who presents complaining of shortness of breath. Patient states for the last 19 days he has been having nausea with shortness of breath on exertion. Patient states that he has had vomiting a couple times. Patient denies diarrhea. Patient has no chest pain or palpitation. Patient states he is not really having any cough. Patient denies any pain or swelling in the legs. Patient has had no recent travel. Patient went to the doctor and they sent him for an x-ray yesterday that showed a viral illness. Patient was started on a Z-Jose F. REVIEW OF SYSTEMS       Review of Systems   Constitutional:  Negative for activity change, appetite change, chills, diaphoresis and fever. HENT:  Negative for congestion, ear pain, facial swelling, nosebleeds, rhinorrhea, sinus pressure, sore throat and trouble swallowing. Eyes:  Negative for pain, discharge and redness. Respiratory:  Positive for shortness of breath. Negative for cough, chest tightness and wheezing. Cardiovascular:  Negative for chest pain, palpitations and leg swelling. Gastrointestinal:  Positive for nausea. Negative for abdominal pain, blood in stool, constipation, diarrhea and vomiting. Genitourinary:  Negative for difficulty urinating, dysuria, flank pain, frequency, genital sores and hematuria. Musculoskeletal:  Negative for arthralgias, back pain, gait problem, joint swelling, myalgias and neck pain. Skin:  Negative for color change, pallor, rash and wound. Neurological:  Positive for headaches.  Negative for dizziness, tremors, seizures, syncope, speech difficulty, weakness and numbness. Psychiatric/Behavioral:  Negative for confusion, decreased concentration, hallucinations, self-injury, sleep disturbance and suicidal ideas. PAST MEDICAL HISTORY   History reviewed. No pertinent past medical history. SURGICAL HISTORY     History reviewed. No pertinent surgical history. CURRENT MEDICATIONS       Previous Medications    AZITHROMYCIN (ZITHROMAX) 250 MG TABLET    Take 2 tabs (500 mg) on Day 1, and take 1 tab (250 mg) on days 2 through 5. ALLERGIES     is allergic to seasonal.    FAMILY HISTORY     has no family status information on file. SOCIAL HISTORY      reports that he has never smoked. He has never used smokeless tobacco. He reports that he does not currently use alcohol. He reports that he does not currently use drugs after having used the following drugs: Marijuana Terrell Margarito). PHYSICAL EXAM     INITIAL VITALS: BP (!) 128/94   Pulse 91   Temp 97.3 °F (36.3 °C) (Oral)   Resp 23   Ht 5' 10\" (1.778 m)   Wt 233 lb (105.7 kg)   SpO2 95%   BMI 33.43 kg/m²      Physical Exam  Vitals and nursing note reviewed. Constitutional:       General: He is not in acute distress. Appearance: He is well-developed. He is not diaphoretic. HENT:      Head: Normocephalic and atraumatic. Eyes:      General: No scleral icterus. Right eye: No discharge. Left eye: No discharge. Conjunctiva/sclera: Conjunctivae normal.      Pupils: Pupils are equal, round, and reactive to light. Cardiovascular:      Rate and Rhythm: Normal rate and regular rhythm. Heart sounds: Normal heart sounds. No murmur heard. No friction rub. No gallop. Pulmonary:      Effort: Pulmonary effort is normal. No respiratory distress. Breath sounds: Normal breath sounds. No wheezing or rales. Chest:      Chest wall: No tenderness. Abdominal:      General: Bowel sounds are normal. There is no distension. Palpations: Abdomen is soft. There is no mass. Tenderness: There is no abdominal tenderness. There is no guarding or rebound. Musculoskeletal:         General: No tenderness. Normal range of motion. Right lower leg: Edema present. Left lower leg: Edema present. Skin:     General: Skin is warm and dry. Coloration: Skin is not pale. Findings: No erythema or rash. Neurological:      Mental Status: He is alert and oriented to person, place, and time. Cranial Nerves: No cranial nerve deficit. Sensory: No sensory deficit. Motor: No abnormal muscle tone. Coordination: Coordination normal.      Deep Tendon Reflexes: Reflexes normal.   Psychiatric:         Behavior: Behavior normal.         Thought Content: Thought content normal.         Judgment: Judgment normal.       MEDICAL DECISION MAKING:     Summary of Patient Presentation:        1)  Number and Complexity of Problems  Problem List This Visit:  Nausea, SOB on exertion    Differential Diagnosis:  Viral illness, Pneumonia, PE, CHF    Diagnoses Considered but Do Not Suspect:  none    Pertinent Comorbid Conditions:  Obesity    2)  Data Reviewed  My EKG interpretation:  EKG Interpretation    Interpreted by me    Rhythm: normal sinus   Rate: normal  Axis: normal  Ectopy: none  Conduction: normal  ST Segments: no acute change  T Waves: no acute change  Q Waves: none    Clinical Impression: no acute changes and normal EKG    Decision Rules/Scores utilized:  None    Tests considered but not ordered and why:  None    External Documents Reviewed:  None    Imaging that is independently reviewed and interpreted by me as:  None    See more data below for the lab and radiology tests and orders.     3)  Treatment and Disposition    MIPS:  None    Social determinants of health impacting treatment or disposition:  None    Shared Decision Making:  None    Code Status Discussion:  None    \"ED Course\" Notes From Epic Narrator:  ED Course as of 02/14/23 Daria Anne Del Rio Feb 14, 2023   0694 Patient was updated on laboratory findings and the need for the CT scan and Dopplers. Doppler tech is in the room and states the patient has a popliteal clot in the right. [JL]   [id number] Patient was updated on the findings and plan for admission. I spoke with Dr. Daya Humphrey for the PCP who agrees to the admission and request the residents to be notified. [JL]   [id number] Residents were notified of the admission [JL]      ED Course User Index  [JL] Warren Solo MD         CRITICAL CARE:   None    PROCEDURES:  None      DATA FOR LAB AND RADIOLOGY TESTS ORDERED BELOW ARE REVIEWED BY THE ED CLINICIAN:    RADIOLOGY: All x-rays, CT, MRI, and formal ultrasound images (except ED bedside ultrasound) are read by the radiologist, see reports below, unless otherwise noted in MDM or here. Reports below are reviewed by myself. XR CHEST PORTABLE   Final Result   Mild right greater than left basilar pulmonary opacities on a background of   low lung volumes likely representing subsegmental atelectasis. These   opacities have increased on the right since yesterday. VL DUP LOWER EXTREMITY VENOUS BILATERAL    (Results Pending)   CT CHEST PULMONARY EMBOLISM W CONTRAST    (Results Pending)       LABS: Lab orders shown below, the results are reviewed by myself, and all abnormals are listed below.   Labs Reviewed   BASIC METABOLIC PANEL - Abnormal; Notable for the following components:       Result Value    Glucose 121 (*)     Sodium 131 (*)     Chloride 96 (*)     All other components within normal limits   CBC WITH AUTO DIFFERENTIAL - Abnormal; Notable for the following components:    RDW 15.0 (*)     Seg Neutrophils 32 (*)     Lymphocytes 55 (*)     Absolute Lymph # 5.01 (*)     All other components within normal limits   D-DIMER, QUANTITATIVE - Abnormal; Notable for the following components:    D-Dimer, Quant 10.15 (*)     All other components within normal limits   URINALYSIS WITH REFLEX TO CULTURE - Abnormal; Notable for the following components:    Protein, UA TRACE (*)     All other components within normal limits   TROP/MYOGLOBIN - Abnormal; Notable for the following components:    Myoglobin 97 (*)     All other components within normal limits   COVID-19 & INFLUENZA COMBO   BRAIN NATRIURETIC PEPTIDE   TSH   T4, FREE   APTT   PROTIME-INR   MICROSCOPIC URINALYSIS   TROP/MYOGLOBIN       Vitals Reviewed:    Vitals:    02/14/23 0738 02/14/23 0838 02/14/23 0915 02/14/23 0945   BP: 132/79 136/86 (!) 128/92 (!) 128/94   Pulse: 92 92 88 91   Resp: 20 20 25 23   Temp: 97.3 °F (36.3 °C)      TempSrc: Oral      SpO2: 97% 95% 94% 95%   Weight: 233 lb (105.7 kg)      Height: 5' 10\" (1.778 m)        MEDICATIONS GIVEN TO PATIENT THIS ENCOUNTER:  Orders Placed This Encounter   Medications    0.9 % sodium chloride bolus    iopamidol (ISOVUE-370) 76 % injection 75 mL    0.9 % sodium chloride bolus    sodium chloride flush 0.9 % injection 10 mL    enoxaparin (LOVENOX) injection 105 mg     Order Specific Question:   Indication of Use     Answer:   Treatment-DVT/PE     DISCHARGE PRESCRIPTIONS:  New Prescriptions    No medications on file     PHYSICIAN CONSULTS ORDERED THIS ENCOUNTER:  IP CONSULT TO PRIMARY CARE PROVIDER    FINAL IMPRESSION      1. Multiple subsegmental pulmonary emboli without acute cor pulmonale (HCC)    2. Acute deep vein thrombosis (DVT) of popliteal vein of right lower extremity ([facility] [address]          DISPOSITION/PLAN   DISPOSITION Admitted 02/14/2023 09:41:28 AM      PATIENT REFERRED TO:  No follow-up provider specified.     DISCHARGE MEDICATIONS:  New Prescriptions    No medications on file       (Please note that portions of this note were completed with a voice recognition program.  Efforts were made to edit the dictations but occasionally words are mis-transcribed.)    Marquise Bsutamante MD  Attending Ewa Love MD  02/14/23 9506

## 2023-02-14 NOTE — ED TRIAGE NOTES
Mode of arrival (squad #, walk in, police, etc) : walk-in        Chief complaint(s): Nausea, emesis x1, shortness of breath, chest congestion        Arrival Note (brief scenario, treatment PTA, etc). : Pt reports that he has had shortness of breath x3 weeks, went to his PCP and was given a Z-shi and has not improved. Patient reports decreased appetite and states, \"I just feel miserable\". C= \"Have you ever felt that you should Cut down on your drinking? \"  No  A= \"Have people Annoyed you by criticizing your drinking? \"  No  G= \"Have you ever felt bad or Guilty about your drinking? \"  No  E= \"Have you ever had a drink as an Eye-opener first thing in the morning to steady your nerves or to help a hangover? \"  No      Deferred []      Reason for deferring: N/A    *If yes to two or more: probable alcohol abuse. *

## 2023-02-15 ENCOUNTER — TELEPHONE (OUTPATIENT)
Dept: INTERNAL MEDICINE CLINIC | Age: 53
End: 2023-02-15

## 2023-02-15 VITALS
HEIGHT: 70 IN | RESPIRATION RATE: 19 BRPM | WEIGHT: 233 LBS | HEART RATE: 86 BPM | TEMPERATURE: 98.6 F | DIASTOLIC BLOOD PRESSURE: 93 MMHG | OXYGEN SATURATION: 90 % | BODY MASS INDEX: 33.36 KG/M2 | SYSTOLIC BLOOD PRESSURE: 146 MMHG

## 2023-02-15 LAB
ANION GAP SERPL CALCULATED.3IONS-SCNC: 7 MMOL/L (ref 9–17)
BUN SERPL-MCNC: 10 MG/DL (ref 6–20)
CALCIUM SERPL-MCNC: 8.5 MG/DL (ref 8.6–10.4)
CHLORIDE SERPL-SCNC: 99 MMOL/L (ref 98–107)
CO2 SERPL-SCNC: 28 MMOL/L (ref 20–31)
CREAT SERPL-MCNC: 0.7 MG/DL (ref 0.7–1.2)
EKG ATRIAL RATE: 94 BPM
EKG P AXIS: 33 DEGREES
EKG P-R INTERVAL: 150 MS
EKG Q-T INTERVAL: 374 MS
EKG QRS DURATION: 82 MS
EKG QTC CALCULATION (BAZETT): 467 MS
EKG R AXIS: 31 DEGREES
EKG T AXIS: 34 DEGREES
EKG VENTRICULAR RATE: 94 BPM
GFR SERPL CREATININE-BSD FRML MDRD: >60 ML/MIN/1.73M2
GLUCOSE SERPL-MCNC: 103 MG/DL (ref 70–99)
INR PPP: 1.1
POTASSIUM SERPL-SCNC: 4.9 MMOL/L (ref 3.7–5.3)
PROTHROMBIN TIME: 13.9 SEC (ref 11.8–14.6)
SODIUM SERPL-SCNC: 134 MMOL/L (ref 135–144)

## 2023-02-15 PROCEDURE — 80048 BASIC METABOLIC PNL TOTAL CA: CPT

## 2023-02-15 PROCEDURE — 85610 PROTHROMBIN TIME: CPT

## 2023-02-15 PROCEDURE — 2580000003 HC RX 258: Performed by: INTERNAL MEDICINE

## 2023-02-15 PROCEDURE — 93010 ELECTROCARDIOGRAM REPORT: CPT | Performed by: INTERNAL MEDICINE

## 2023-02-15 PROCEDURE — 36415 COLL VENOUS BLD VENIPUNCTURE: CPT

## 2023-02-15 PROCEDURE — 6370000000 HC RX 637 (ALT 250 FOR IP)

## 2023-02-15 RX ORDER — BENZONATATE 100 MG/1
100 CAPSULE ORAL 3 TIMES DAILY PRN
Status: DISCONTINUED | OUTPATIENT
Start: 2023-02-15 | End: 2023-02-15 | Stop reason: HOSPADM

## 2023-02-15 RX ORDER — BENZONATATE 100 MG/1
100 CAPSULE ORAL 3 TIMES DAILY PRN
Qty: 30 CAPSULE | Refills: 1 | Status: SHIPPED | OUTPATIENT
Start: 2023-02-15 | End: 2023-02-22

## 2023-02-15 RX ADMIN — APIXABAN 10 MG: 5 TABLET, FILM COATED ORAL at 11:14

## 2023-02-15 RX ADMIN — SODIUM CHLORIDE, PRESERVATIVE FREE 10 ML: 5 INJECTION INTRAVENOUS at 11:16

## 2023-02-15 ASSESSMENT — ENCOUNTER SYMPTOMS
DIARRHEA: 0
BACK PAIN: 0
RHINORRHEA: 0
SHORTNESS OF BREATH: 0
SORE THROAT: 0
COUGH: 0
ABDOMINAL PAIN: 0
NAUSEA: 0

## 2023-02-15 NOTE — PROGRESS NOTES
[facility]    PROGRESS NOTE             2/15/2023    6:30 AM    Name:   Ramos Ibrahim  MRN:     [id number]     Acct:      [de-identified]   Room:   2051/2051-01  IP Day:  1  Admit Date:  2/14/2023  7:33 AM    PCP:  Min Hanley MD  Code Status:  Full Code    Subjective:     C/C:   Chief Complaint   Patient presents with    Nausea     X1 week intermittently      Shortness of Breath     x3 weeks       Interval History Status: not changed. Patient seen and examined at bedside this morning  Blood pressure elevated slightly at 146/93, patient is on room air  No acute events per chart review and per nurse. Denies chest pain, shortness of breath. Was switched over to Eliquis 10 mg last night  Echo results are pending. Patient most likely be discharged after echo results on Eliquis    Brief History:     The patient is a 59-year-old male with no significant past medical history presenting today for shortness of breath that has been ongoing for the last 3 weeks. Patient stated that he has been having worsening dyspnea. Denies any chest pain. States that he has had episodes where he wakes up from sleep gasping for air. Had to take off work due to worsening dyspnea. Has not seen a PCP in 20 years. He is a non-smoker. Denies any family history of blood clots. No recent history of travel or any surgeries. Denies any current comorbidities. Patient states that he was having worsening congestion and went to outpatient clinic and was given a Z-Jose F. His symptoms continue to worsen, and this morning at 3 AM patient became really diaphoretic and had an episode of vomiting, with worsening dyspnea. In the ED patient was vitally stable. CT of the chest showed bilateral acute PE with no heart strain. As well as right-sided popliteal DVT. Patient was given Lovenox in the ED. Will be started on Eliquis tonight and will have an echo ordered.   EKG shows sinus rhythm. Patient is being admitted for the management of PE and right-sided popliteal DVT. Review of Systems:     Review of Systems   Constitutional:  Negative for chills and fever. HENT:  Negative for rhinorrhea and sore throat. Eyes:  Negative for visual disturbance. Respiratory:  Negative for cough and shortness of breath. Cardiovascular:  Negative for chest pain and leg swelling. Gastrointestinal:  Negative for abdominal pain, diarrhea and nausea. Genitourinary:  Negative for dysuria and hematuria. Musculoskeletal:  Negative for back pain. Skin:  Negative for rash. Neurological:  Positive for headaches. Negative for numbness. Psychiatric/Behavioral:  Negative for agitation. Medications: Allergies: Allergies   Allergen Reactions    Seasonal        Current Meds:   Scheduled Meds:    sodium chloride flush  5-40 mL IntraVENous 2 times per day    apixaban  10 mg Oral BID    Followed by    Arnold Potter ON 2023] apixaban  5 mg Oral BID     Continuous Infusions:    sodium chloride       PRN Meds: sodium chloride flush, sodium chloride flush, sodium chloride, potassium chloride **OR** potassium alternative oral replacement **OR** potassium chloride, magnesium sulfate, ondansetron **OR** ondansetron, polyethylene glycol, acetaminophen **OR** acetaminophen, perflutren lipid microspheres    Data:     Past Medical History:   has no past medical history on file. Social History:   reports that he has never smoked. He has never used smokeless tobacco. He reports that he does not currently use alcohol. He reports that he does not currently use drugs after having used the following drugs: Marijuana Jared Mustard). Family History: History reviewed. No pertinent family history.     Vitals:  BP (!) 146/93   Pulse 86   Temp 98.6 °F (37 °C)   Resp 19   Ht 5' 10\" (1.778 m)   Wt 233 lb (105.7 kg)   SpO2 90%   BMI 33.43 kg/m²   Temp (24hrs), Av.8 °F (36.6 °C), Min:97.3 °F (36.3 °C), Max:98.6 °F (37 °C)    No results for input(s): POCGLU in the last 72 hours. I/O(24Hr): Intake/Output Summary (Last 24 hours) at 2/15/2023 0630  Last data filed at 2/15/2023 5938  Gross per 24 hour   Intake 1750 ml   Output 1000 ml   Net 750 ml       Labs:    CBC:   Lab Results   Component Value Date/Time    WBC 9.1 02/14/2023 08:04 AM    RBC 4.59 02/14/2023 08:04 AM    HGB 13.9 02/14/2023 08:04 AM    HCT 41.1 02/14/2023 08:04 AM    MCV 89.5 02/14/2023 08:04 AM    MCH 30.3 02/14/2023 08:04 AM    MCHC 33.9 02/14/2023 08:04 AM    RDW 15.0 02/14/2023 08:04 AM     02/14/2023 08:04 AM    MPV 6.5 02/14/2023 08:04 AM     WBC:    Lab Results   Component Value Date/Time    WBC 9.1 02/14/2023 08:04 AM       No results found for: SPECIAL  No results found for: CULTURE      Radiology:    XR CHEST (2 VW)    Result Date: 2/13/2023  EXAMINATION: TWO XRAY VIEWS OF THE CHEST 2/13/2023 11:23 am COMPARISON: February 7, 2023 HISTORY: ORDERING SYSTEM PROVIDED HISTORY: SOB (shortness of breath) TECHNOLOGIST PROVIDED HISTORY: Reason for Exam: patient c/o sob and states he is on antibiotics but not getting any better FINDINGS: The lungs are without acute focal process. There is no effusion or pneumothorax. The cardiomediastinal silhouette is without acute process. The osseous structures are without acute process. No acute process. XR CHEST (2 VW)    Result Date: 2/7/2023  EXAMINATION: TWO XRAY VIEWS OF THE CHEST 2/7/2023 11:03 am COMPARISON: None. HISTORY: ORDERING SYSTEM PROVIDED HISTORY: SOB (shortness of breath) TECHNOLOGIST PROVIDED HISTORY: Reason for Exam: pt stated SOB , cough x few wks FINDINGS: Normal cardiomediastinal silhouette. Subtle patchy bibasilar opacities more apparent at the left costophrenic angle on the PA view. No discrete area of consolidation no pleural effusion. No pneumothorax. No acute bony abnormality.      Mild patchy bibasilar opacities more apparent at the left costophrenic angle. 6-8 week follow-up may be considered. XR CHEST PORTABLE    Result Date: 2/14/2023  EXAMINATION: ONE XRAY VIEW OF THE CHEST 2/14/2023 8:06 am COMPARISON: Chest radiographs 02/13/2023, 02/07/2023 HISTORY: ORDERING SYSTEM PROVIDED HISTORY: SOB TECHNOLOGIST PROVIDED HISTORY: SOB FINDINGS: AP upright Lines/tubes: None. Mediastinum: No mediastinal widening or shift or cardiomegaly. Lungs/pleura: Low lung volumes. Right greater than left basilar pulmonary opacity. No pneumothorax or large pleural effusion. Bones: No acute findings. Mild right greater than left basilar pulmonary opacities on a background of low lung volumes likely representing subsegmental atelectasis. These opacities have increased on the right since yesterday. CT CHEST PULMONARY EMBOLISM W CONTRAST    Result Date: 2/14/2023  EXAMINATION: CTA OF THE CHEST 2/14/2023 9:26 am TECHNIQUE: CTA of the chest was performed after the administration of intravenous contrast.  Multiplanar reformatted images are provided for review. MIP images are provided for review. Automated exposure control, iterative reconstruction, and/or weight based adjustment of the mA/kV was utilized to reduce the radiation dose to as low as reasonably achievable. COMPARISON: None. HISTORY: ORDERING SYSTEM PROVIDED HISTORY: SOB, elevated d-dimer TECHNOLOGIST PROVIDED HISTORY: SOB, elevated d-dimer Decision Support Exception - unselect if not a suspected or confirmed emergency medical condition->Emergency Medical Condition (MA) Reason for Exam: Shortness of breath, elevated d-dimer Additional signs and symptoms: Pt c/o shortness of breath, nausea, poor appetite, fatigue x 2 weeks Relevant Medical/Surgical History: No hx diabetes, cancer or surgery to area of interest FINDINGS: Limited by large amount of streak artifacts. Pulmonary Arteries: There are filling defects seen right lower lobe and middle lobe pulmonary arterial branches compatible with acute pulmonary embolism. Similar defects in subsegmental left lower lobe branches and also in the lingular branch. Minimal in bilateral upper lobe subsegmental branches. No right heart strain. Pulmonary trunk normal size. Mediastinum: A few scattered mediastinal lymph nodes. Largest 13 mm short axis along side the aortic arch. Thyroid gland and esophagus grossly normal. Cardiac size normal.  Normal caliber aorta. Lungs/pleura: Limited by motion artifact. Patchy subsegmental atelectasis in the posterior lung bases. Minimal in the anterior right lung base. No suspicious lung mass. No significant nodules. No pleural effusion or pneumothorax. Upper Abdomen: Limited images of the upper abdomen show no acute process. Soft Tissues/Bones: No acute bone or soft tissue abnormality. 1. Bilateral acute pulmonary emboli most pronounced in the right lower lobe branches. Mild to moderate thrombus load. No right heart strain. 2. Subsegmental atelectasis at lung bases. Findings were discussed with Mark Ferrari at 9:46 a.m. on 2/14/2023. Physical Examination:        Physical Exam  Vitals reviewed. Constitutional:       General: He is not in acute distress. Appearance: Normal appearance. He is obese. Cardiovascular:      Rate and Rhythm: Normal rate and regular rhythm. Pulses: Normal pulses. Heart sounds: Normal heart sounds. No murmur heard. Pulmonary:      Effort: Pulmonary effort is normal.      Breath sounds: Normal breath sounds. Abdominal:      General: Bowel sounds are normal.      Palpations: Abdomen is soft. Tenderness: There is no abdominal tenderness. Musculoskeletal:      Cervical back: Normal range of motion. Right lower leg: No edema. Left lower leg: No edema. Neurological:      Mental Status: He is alert.    Psychiatric:         Mood and Affect: Mood normal.         Assessment:        Primary Problem  Pulmonary embolism without acute cor pulmonale, unspecified chronicity, unspecified pulmonary embolism type [facility])    Active Hospital Problems    Diagnosis Date Noted    Pulmonary embolism without acute cor pulmonale, unspecified chronicity, unspecified pulmonary embolism type ([facility] [address] [I26.99] 02/14/2023     Priority: Medium       Plan:        Bilateral PE with no right heart strain as well as right-sided popliteal DVT  - CT chest showed bilateral acute pulmonary embolism in the right lower lobe branches as well as popliteal DVT  - D-dimer elevated at 10.15  - Troponins are normal on admission  - EKG on admission showed normal sinus rhythm  - Chest x-ray on admission showed segmental atelectasis  - Patient given 1 dose of Lovenox in the ED  - Will be switched to Eliquis 10 mg twice daily for for 7 days  - Patient currently on room air, all vital stable  - Echo completed, results pending  - Patient most likely to be discharged today after echo results with follow-up with PCP      DVT prophylaxis: Eliquis 10mg twice daily        Micah Still DO  2/15/2023  6:30 AM

## 2023-02-15 NOTE — CARE COORDINATION
Case Management Assessment  Initial Evaluation    Date/Time of Evaluation: 2/15/2023 10:01 AM  Assessment Completed by: Sophia Grimaldo RN    If patient is discharged prior to next notation, then this note serves as note for discharge by case management. Patient Name: Therese Rico                   YOB: 1970  Diagnosis: Acute deep vein thrombosis (DVT) of popliteal vein of right lower extremity ([address] [I82.431]  Pulmonary embolism without acute cor pulmonale, unspecified chronicity, unspecified pulmonary embolism type ([address] [I26.99]  Multiple subsegmental pulmonary emboli without acute cor pulmonale ([address] [I26.94]                   Date / Time: 2/14/2023  7:33 AM    Patient Admission Status: Inpatient   Readmission Risk (Low < 19, Mod (19-27), High > 27): Readmission Risk Score: 5.1    Current PCP: Letty Ku MD  PCP verified by CM? Yes    Chart Reviewed: Yes      History Provided by: Patient  Patient Orientation: Alert and Oriented    Patient Cognition: Alert    Hospitalization in the last 30 days (Readmission):  No    If yes, Readmission Assessment in CM Navigator will be completed. Advance Directives:      Code Status: Full Code   Patient's Primary Decision Maker is: Patient Declined (Legal Next of Kin Remains as Decision Maker)      Discharge Planning:    Patient lives with: Alone Type of Home: House  Primary Care Giver: Self  Patient Support Systems include: Parent   Current Financial resources:    Current community resources:    Current services prior to admission: None            Current DME:              Type of Home Care services:  None    ADLS  Prior functional level: Independent in ADLs/IADLs  Current functional level: Independent in ADLs/IADLs    PT AM-PAC:   /24  OT AM-PAC:   /24    Family can provide assistance at DC: Yes  Would you like Case Management to discuss the discharge plan with any other family members/significant others, and if so, who?  No  Plans to Return to Present Housing: Yes  Other Identified Issues/Barriers to RETURNING to current housing: Yes  Potential Assistance needed at discharge: N/A            Potential DME:    Patient expects to discharge to: [facility] for transportation at discharge: [address] / Plan: [address] / Product Type: *No Product type* /     Does insurance require precert for SNF: Yes    Potential assistance Purchasing Medications: No  Meds-to-Beds request:        [facility] #[id number] [place], [address]  [address] [facility] [address]  Phone: [phone] Fax: [phone]      Notes:    Factors facilitating achievement of predicted outcomes: Family support, Motivated, Cooperative, and Pleasant    Barriers to discharge: NA    Additional Case Management Notes: 2/15/23 [facility] [place] Pt. Lives alone in 1 story home. No DME, Denies VNS. Eliquis started, for DVT/PE. Has [employer], both cards given. Denies other needs. DC today//KB    The Plan for Transition of Care is related to the following treatment goals of Acute deep vein thrombosis (DVT) of popliteal vein of right lower extremity (HCC) [I82.431]  Pulmonary embolism without acute cor pulmonale, unspecified chronicity, unspecified pulmonary embolism type (HCC) [I26.99]  Multiple subsegmental pulmonary emboli without acute cor pulmonale ([address] [R71.20]    IF APPLICABLE: The Patient and/or patient representative Maury Brown and his family were provided with a choice of provider and agrees with the discharge plan. Freedom of choice list with basic dialogue that supports the patient's individualized plan of care/goals and shares the quality data associated with the providers was provided to:     Patient Representative Name:       The Patient and/or Patient Representative Agree with the Discharge Plan?       Cabrera Mcneil RN  Case Management Department  Ph: [phone] Fax: [phone]

## 2023-02-15 NOTE — PROGRESS NOTES
Physical Therapy        Physical Therapy Cancel Note      DATE: 2/15/2023    NAME: Farshad Nugent  MRN: [id number]   : 1970      Patient not seen this date for Physical Therapy due to:    2- at 5- D/C PT. Pt observed walking independently in his room and sitting down into bedside chair. Pt denies need for skilled PT. Pt denies weakness or balance issues. Case discussed w/ OT Ariella Blackwell and nurse Will.        Electronically signed by Torie Odell PT on 2/15/2023 at 1:48 PM

## 2023-02-15 NOTE — DISCHARGE SUMMARY
[address]    Discharge Summary     Patient ID: Mohsen Deras  :  1970   MRN: [id number]     ACCOUNT:  [de-identified]   Patient's PCP: Leonardo Duron MD  Admit Date: 2023   Discharge Date: 2/15/2023     Length of Stay: 1  Code Status:  Prior  Admitting Physician: Rubia Sánchez MD  Discharge Physician: Hector Nguyen MD     Active Discharge Diagnoses:       Primary Problem  Pulmonary embolism without acute cor pulmonale, unspecified chronicity, unspecified pulmonary embolism type [facility])      [place] Problems    Diagnosis Date Noted    Pulmonary embolism without acute cor pulmonale, unspecified chronicity, unspecified pulmonary embolism type ([facility] [I26.99] 2023     Priority: Medium       Admission Condition:  fair     Discharged Condition: fair    Hospital Stay:       Hospital Course:  Mohsen Deras is a 46 y.o. male who was admitted for the management of   Pulmonary embolism without acute cor pulmonale, unspecified chronicity, unspecified pulmonary embolism type ([facility] , presented to the ER with dyspnea for the past 3 weeks. Patient was previously given a Z-Jose F as an outpatient, which he did not respond to. In the emergency department CT PE was remarkable for acute bilateral PE with no heart strain as well as right-sided popliteal DVT. Patient was started on high dose Lovenox, later transitioned to Eliquis 10 mg twice daily. EKG was unremarkable. Echo did not show any right heart strain, consistent with CT. On day of discharge, patient is feeling better with improvement in dyspnea. Patient aware that he will be discharged with 7 days of 10 mg of Eliquis twice daily which will later be reduced to 5 mg twice daily for 6-month course. Patient was informed to follow-up with his primary care within 1 week.     Significant therapeutic interventions: High-dose Lovenox later transitioned to Eliquis. Significant Diagnostic Studies:   Labs / Micro:  CBC:   Lab Results   Component Value Date/Time    WBC 9.1 02/14/2023 08:04 AM    RBC 4.59 02/14/2023 08:04 AM    HGB 13.9 02/14/2023 08:04 AM    HCT 41.1 02/14/2023 08:04 AM    MCV 89.5 02/14/2023 08:04 AM    MCH 30.3 02/14/2023 08:04 AM    MCHC 33.9 02/14/2023 08:04 AM    RDW 15.0 02/14/2023 08:04 AM     02/14/2023 08:04 AM     BMP:    Lab Results   Component Value Date/Time    GLUCOSE 103 02/15/2023 07:14 AM     02/15/2023 07:14 AM    K 4.9 02/15/2023 07:14 AM    CL 99 02/15/2023 07:14 AM    CO2 28 02/15/2023 07:14 AM    ANIONGAP 7 02/15/2023 07:14 AM    BUN 10 02/15/2023 07:14 AM    CREATININE 0.70 02/15/2023 07:14 AM    CALCIUM 8.5 02/15/2023 07:14 AM    LABGLOM >60 02/15/2023 07:14 AM     Radiology:    XR CHEST (2 VW)    Result Date: 2/13/2023  EXAMINATION: TWO XRAY VIEWS OF THE CHEST 2/13/2023 11:23 am COMPARISON: February 7, 2023 HISTORY: ORDERING SYSTEM PROVIDED HISTORY: SOB (shortness of breath) TECHNOLOGIST PROVIDED HISTORY: Reason for Exam: patient c/o sob and states he is on antibiotics but not getting any better FINDINGS: The lungs are without acute focal process. There is no effusion or pneumothorax. The cardiomediastinal silhouette is without acute process. The osseous structures are without acute process. No acute process. XR CHEST (2 VW)    Result Date: 2/7/2023  EXAMINATION: TWO XRAY VIEWS OF THE CHEST 2/7/2023 11:03 am COMPARISON: None. HISTORY: ORDERING SYSTEM PROVIDED HISTORY: SOB (shortness of breath) TECHNOLOGIST PROVIDED HISTORY: Reason for Exam: pt stated SOB , cough x few wks FINDINGS: Normal cardiomediastinal silhouette. Subtle patchy bibasilar opacities more apparent at the left costophrenic angle on the PA view. No discrete area of consolidation no pleural effusion. No pneumothorax. No acute bony abnormality. Mild patchy bibasilar opacities more apparent at the left costophrenic angle.  6-8 week follow-up may be considered.     XR CHEST PORTABLE    Result Date: 2/14/2023  EXAMINATION: ONE XRAY VIEW OF THE CHEST 2/14/2023 8:06 am COMPARISON: Chest radiographs 02/13/2023, 02/07/2023 HISTORY: ORDERING SYSTEM PROVIDED HISTORY: SOB TECHNOLOGIST PROVIDED HISTORY: SOB FINDINGS: AP upright Lines/tubes: None. Mediastinum: No mediastinal widening or shift or cardiomegaly. Lungs/pleura: Low lung volumes.  Right greater than left basilar pulmonary opacity.  No pneumothorax or large pleural effusion. Bones: No acute findings.     Mild right greater than left basilar pulmonary opacities on a background of low lung volumes likely representing subsegmental atelectasis.  These opacities have increased on the right since yesterday.     CT CHEST PULMONARY EMBOLISM W CONTRAST    Result Date: 2/14/2023  EXAMINATION: CTA OF THE CHEST 2/14/2023 9:26 am TECHNIQUE: CTA of the chest was performed after the administration of intravenous contrast.  Multiplanar reformatted images are provided for review.  MIP images are provided for review. Automated exposure control, iterative reconstruction, and/or weight based adjustment of the mA/kV was utilized to reduce the radiation dose to as low as reasonably achievable. COMPARISON: None. HISTORY: ORDERING SYSTEM PROVIDED HISTORY: SOB, elevated d-dimer TECHNOLOGIST PROVIDED HISTORY: SOB, elevated d-dimer Decision Support Exception - unselect if not a suspected or confirmed emergency medical condition->Emergency Medical Condition (MA) Reason for Exam: Shortness of breath, elevated d-dimer Additional signs and symptoms: Pt c/o shortness of breath, nausea, poor appetite, fatigue x 2 weeks Relevant Medical/Surgical History: No hx diabetes, cancer or surgery to area of interest FINDINGS: Limited by large amount of streak artifacts. Pulmonary Arteries: There are filling defects seen right lower lobe and middle lobe pulmonary arterial branches compatible with acute pulmonary embolism.  Similar  defects in subsegmental left lower lobe branches and also in the lingular branch. Minimal in bilateral upper lobe subsegmental branches. No right heart strain. Pulmonary trunk normal size. Mediastinum: A few scattered mediastinal lymph nodes. Largest 13 mm short axis along side the aortic arch. Thyroid gland and esophagus grossly normal. Cardiac size normal.  Normal caliber aorta. Lungs/pleura: Limited by motion artifact. Patchy subsegmental atelectasis in the posterior lung bases. Minimal in the anterior right lung base. No suspicious lung mass. No significant nodules. No pleural effusion or pneumothorax. Upper Abdomen: Limited images of the upper abdomen show no acute process. Soft Tissues/Bones: No acute bone or soft tissue abnormality. 1. Bilateral acute pulmonary emboli most pronounced in the right lower lobe branches. Mild to moderate thrombus load. No right heart strain. 2. Subsegmental atelectasis at lung bases. Findings were discussed with Sarah Puckett at 9:46 a.m. on 2/14/2023.      VL DUP LOWER EXTREMITY VENOUS BILATERAL    Result Date: 2/15/2023    [facility]  Vascular Lower Extremities DVT Study Procedure   Patient Name  Félix Rivera      Date of Study           02/14/2023                Bernadette Akbar   Date of Birth 1970  Gender                  Male   Age           46 year(s)  Race                       Room Number   2051        Height:                 70 inch, 177.8 cm   Corporate ID  [id number]    Weight:                 233 pounds, 105.7 kg  #   Patient Acct  [de-identified]   BSA:        2.23 m^2    BMI:       33.43 kg/m^2  #   MR #          [id number]      Sonographer             Ligia Aly RVT   Accession #   [id number]  Interpreting Physician  Kt Adamson   Referring                 Referring Physician     Amari Borden MD  Nurse  Practitioner  Procedure Type of Study:   Veins: Lower Extremities DVT Study, Venous Scan Lower Bilateral.  Indications for Study:Elevated D-Dimer and Leg Swelling. Patient Status:ER. Technical Quality:Adequate visualization.   - Critical Result:Dr. Ozzy Guillaume. Conclusions   Summary   Simultaneous real time imaging utilizing B-Mode, color doppler and  spectral waveform analysis was performed on the bilateral lower  extremities for venous examination of the deep and superficial systems. Findings are:   Right:  Acute deep venous thrombosis identified in the popliteal vein and tibial  peroneal trunk. Left:  No evidence of deep or superficial venous thrombosis. Signature   ----------------------------------------------------------------  Electronically signed by Michelle Kenny RVT(Sonographer) on  02/14/2023 09:16 AM  ----------------------------------------------------------------   ----------------------------------------------------------------  Electronically signed by Agus Wasserman(Interpreting  physician) on 02/15/2023 08:36 AM  ----------------------------------------------------------------  Findings:   Right Impression:                    Left Impression:  The common femoral, femoral,         The common femoral, femoral,  popliteal and tibial veins           popliteal and tibial veins  demonstrate normal compressibility   demonstrate normal compressibility  and augmentation. and augmentation. The popliteal vein and the           Normal compressibility of the great  tibio-peroneal trunk are dilated and saphenous vein. non-compressible with hypoechoic  echoes. Normal compressibility of the small                                       saphenous vein. Normal compressibility of the great  saphenous vein. Normal compressibility of the small  saphenous vein. Enlarged lymph nodes are noted at  the right groin level.   Velocities are measured in cm/s ; Diameters are measured in cm Right Lower Extremities DVT Study Measurements Right 2D Measurements +------------------------------------+----------+---------------+----------+ ! Location                            ! Visualized! Compressibility! Thrombosis! +------------------------------------+----------+---------------+----------+ ! Common Femoral                      !Yes       ! Yes            ! None      ! +------------------------------------+----------+---------------+----------+ ! Prox Femoral                        !Yes       ! Yes            ! None      ! +------------------------------------+----------+---------------+----------+ ! Mid Femoral                         !Yes       ! Yes            ! None      ! +------------------------------------+----------+---------------+----------+ ! Dist Femoral                        !Yes       ! Yes            ! None      ! +------------------------------------+----------+---------------+----------+ ! Deep Femoral                        !Yes       ! Yes            ! None      ! +------------------------------------+----------+---------------+----------+ ! Popliteal                           !Yes       ! No             !          ! +------------------------------------+----------+---------------+----------+ ! Sapheno Femoral Junction            ! Yes       ! Yes            ! None      ! +------------------------------------+----------+---------------+----------+ ! PTV                                 ! Yes       ! Yes            ! None      ! +------------------------------------+----------+---------------+----------+ ! Peroneal                            !Yes       ! Yes            ! None      ! +------------------------------------+----------+---------------+----------+ ! Gastroc                             ! Yes       ! Yes            ! None      ! +------------------------------------+----------+---------------+----------+ ! GSV Thigh                           ! Yes       ! Yes            ! None      ! +------------------------------------+----------+---------------+----------+ ! GSV Knee             !Yes       !Yes            !None      ! +------------------------------------+----------+---------------+----------+ !GSV Ankle                           !Yes       !Yes            !None      ! +------------------------------------+----------+---------------+----------+ !SSV                                 !Yes       !Yes            !None      ! +------------------------------------+----------+---------------+----------+ Left Lower Extremities DVT Study Measurements Left 2D Measurements +------------------------------------+----------+---------------+----------+ !Location                            !Visualized!Compressibility!Thrombosis! +------------------------------------+----------+---------------+----------+ !Common Femoral                      !Yes       !Yes            !None      ! +------------------------------------+----------+---------------+----------+ !Prox Femoral                        !Yes       !Yes            !None      ! +------------------------------------+----------+---------------+----------+ !Mid Femoral                         !Yes       !Yes            !None      ! +------------------------------------+----------+---------------+----------+ !Dist Femoral                        !Yes       !Yes            !None      ! +------------------------------------+----------+---------------+----------+ !Deep Femoral                        !Yes       !Yes            !None      ! +------------------------------------+----------+---------------+----------+ !Popliteal                           !Yes       !Yes            !None      ! +------------------------------------+----------+---------------+----------+ !Sapheno Femoral Junction            !Yes       !Yes            !None      ! +------------------------------------+----------+---------------+----------+ !PTV                                 !Yes       !Yes            !None      !  +------------------------------------+----------+---------------+----------+ ! Peroneal                            !Yes       ! Yes            ! None      ! +------------------------------------+----------+---------------+----------+ ! Gastroc                             ! Yes       ! Yes            ! None      ! +------------------------------------+----------+---------------+----------+ ! GSV Thigh                           ! Yes       ! Yes            ! None      ! +------------------------------------+----------+---------------+----------+ ! GSV Knee                            ! Yes       ! Yes            ! None      ! +------------------------------------+----------+---------------+----------+ ! GSV Ankle                           ! Yes       ! Yes            ! None      ! +------------------------------------+----------+---------------+----------+ ! SSV                                 ! Yes       ! Yes            ! None      ! +------------------------------------+----------+---------------+----------+        Consultations:    Consults:     Final Specialist Recommendations/Findings:   IP CONSULT TO PRIMARY CARE PROVIDER      The patient was seen and examined on day of discharge and this discharge summary is in conjunction with any daily progress note from day of discharge. Discharge plan:       Disposition: Home    Physician Follow Up: Manny Rowe MD  [address] [place] [address]  [phone]    Schedule an appointment as soon as possible for a visit in 1 week(s)  hospital follow up       Requiring Further Evaluation/Follow Up POST HOSPITALIZATION/Incidental Findings: Not applicable    Diet: regular diet    Activity: As tolerated    Instructions to Patient: Continue taking Eliquis 10 mg twice daily for 7 days then reduce dose to 5 mg twice daily for 6 months. Follow-up with primary care within 1 week.     Discharge Medications:      Medication List        START taking these medications      * apixaban 5 MG Tabs tablet  Commonly known as: ELIQUIS  Take 2 tablets by mouth 2 times daily for 12 doses     * apixaban 5 MG Tabs tablet  Commonly known as: ELIQUIS  Take 1 tablet by mouth 2 times daily  Start taking on: February 21, 2023     benzonatate 100 MG capsule  Commonly known as: TESSALON  Take 1 capsule by mouth 3 times daily as needed for Cough           * This list has 2 medication(s) that are the same as other medications prescribed for you. Read the directions carefully, and ask your doctor or other care provider to review them with you. CONTINUE taking these medications      guaiFENesin 600 MG extended release tablet  Commonly known as: MUCINEX     therapeutic multivitamin-minerals tablet               Where to Get Your Medications        These medications were sent to [facility], [address] [place] [address]      Phone: [phone]   apixaban 5 MG Tabs tablet  apixaban 5 MG Tabs tablet  benzonatate 100 MG capsule         Time Spent on discharge is  35 mins in patient examination, evaluation, counseling as well as medication reconciliation, prescriptions for required medications, discharge plan and follow up. Electronically signed by   Marco A Le MD  2/15/2023  4:39 PM      Thank you Dr. iMn Hanley MD for the opportunity to be involved in this patient's care.

## 2023-02-15 NOTE — TELEPHONE ENCOUNTER
Patient calling for hospital follow up when phone system was down. Patient also had questions regarding medications from discharge.     Please contact patient for details.

## 2023-02-15 NOTE — PROGRESS NOTES
Patient stated he is waiting to be discharged; patient talked about his medical issues and is guarded and worried whether they have been resolved; patient talked about his travels and his spiritual beliefs; listening presence and support; patient declined; judiing given       02/15/23 [id number]   Encounter Summary   Encounter Overview/Reason  Spiritual/Emotional Needs   Service Provided For: Patient   Referral/Consult From: Rounding   Last Encounter  02/15/23   Complexity of Encounter Moderate   Spiritual/Emotional needs   Type Spiritual Support   Grief, Loss, and Adjustments   Type Adjustment to illness   Assessment/Intervention/Outcome   Assessment Coping; Hopeful;Powerlessness   Intervention Active listening;Discussed belief system/Mandaeism practices/kellie;Discussed illness injury and its impact; Explored/Affirmed feelings, thoughts, concerns;Sustaining Presence/Ministry of presence   Outcome Coping;Engaged in conversation;Expressed feelings, needs, and concerns;Expressed Gratitude;Receptive

## 2023-02-15 NOTE — PROGRESS NOTES
[address] [facility]   OCCUPATIONAL THERAPY MISSED TREATMENT NOTE   INPATIENT   Date: 2/15/23  Patient Name: Gary Payment       Room: 8567/8196-27  MRN: [id number]   Account #: [de-identified]    : 1970  (46 y.o.)  Gender: male                 REASON FOR MISSED TREATMENT:  Okay for OT assessment per MAXIMO Will. Writer observed patient independently ambulating in room with no signs of distress/shortness of breath. Introduced self and explained role of therapy. Pt is independent with self care and functional mobility, denied concerns upon discharge. Pt did not indicate SOB with mobility. Discontinue occupational therapy as no skilled services are identified.  Please re-order if change in status occurs    -    [address], OTR/L

## 2023-02-16 ENCOUNTER — OFFICE VISIT (OUTPATIENT)
Dept: INTERNAL MEDICINE CLINIC | Age: 53
End: 2023-02-16
Payer: COMMERCIAL

## 2023-02-16 VITALS
DIASTOLIC BLOOD PRESSURE: 64 MMHG | OXYGEN SATURATION: 96 % | HEART RATE: 90 BPM | SYSTOLIC BLOOD PRESSURE: 118 MMHG | BODY MASS INDEX: 34.01 KG/M2 | WEIGHT: 237 LBS

## 2023-02-16 DIAGNOSIS — Z09 HOSPITAL DISCHARGE FOLLOW-UP: Primary | ICD-10-CM

## 2023-02-16 DIAGNOSIS — I26.99 PULMONARY EMBOLISM WITHOUT ACUTE COR PULMONALE, UNSPECIFIED CHRONICITY, UNSPECIFIED PULMONARY EMBOLISM TYPE (HCC): ICD-10-CM

## 2023-02-16 DIAGNOSIS — I82.4Y1 ACUTE DEEP VEIN THROMBOSIS (DVT) OF PROXIMAL VEIN OF RIGHT LOWER EXTREMITY (HCC): ICD-10-CM

## 2023-02-16 PROCEDURE — 1111F DSCHRG MED/CURRENT MED MERGE: CPT | Performed by: INTERNAL MEDICINE

## 2023-02-16 PROCEDURE — G8427 DOCREV CUR MEDS BY ELIG CLIN: HCPCS | Performed by: INTERNAL MEDICINE

## 2023-02-16 PROCEDURE — 99213 OFFICE O/P EST LOW 20 MIN: CPT | Performed by: INTERNAL MEDICINE

## 2023-02-16 PROCEDURE — 1036F TOBACCO NON-USER: CPT | Performed by: INTERNAL MEDICINE

## 2023-02-16 PROCEDURE — G8417 CALC BMI ABV UP PARAM F/U: HCPCS | Performed by: INTERNAL MEDICINE

## 2023-02-16 PROCEDURE — G8484 FLU IMMUNIZE NO ADMIN: HCPCS | Performed by: INTERNAL MEDICINE

## 2023-02-16 PROCEDURE — 3017F COLORECTAL CA SCREEN DOC REV: CPT | Performed by: INTERNAL MEDICINE

## 2023-02-16 ASSESSMENT — PATIENT HEALTH QUESTIONNAIRE - PHQ9
SUM OF ALL RESPONSES TO PHQ9 QUESTIONS 1 & 2: 0
1. LITTLE INTEREST OR PLEASURE IN DOING THINGS: 0
SUM OF ALL RESPONSES TO PHQ QUESTIONS 1-9: 0
2. FEELING DOWN, DEPRESSED OR HOPELESS: 0
SUM OF ALL RESPONSES TO PHQ QUESTIONS 1-9: 0

## 2023-02-16 ASSESSMENT — ENCOUNTER SYMPTOMS: SHORTNESS OF BREATH: 1

## 2023-02-16 ASSESSMENT — COPD QUESTIONNAIRES: COPD: 1

## 2023-02-16 NOTE — PROGRESS NOTES
[address] [facility] [address]  [place] [address]  Dept: [phone]  Dept Fax: [phone]    Carolyne Antunez is a 46 y.o. male who presents today for his medicalconditions/complaints as noted below. Carolyne Antunez is c/o of Follow-Up from Hospital      HPI:     COPD  He complains of shortness of breath. Primary symptoms comments: Found to have pulmonary embolisms. . This is a new problem. The current episode started 1 to 4 weeks ago. The problem occurs constantly. The problem has been gradually improving. His symptoms are aggravated by nothing. Relieved by: started on lovenox in hospital and switched to eliquis. He reports moderate improvement on treatment. Has right  sided DVT from 2/14/23. No past medical history on file. Current Outpatient Medications   Medication Sig Dispense Refill    apixaban (ELIQUIS) 5 MG TABS tablet Take 2 tablets by mouth 2 times daily for 12 doses 24 tablet 0    [START ON 2/21/2023] apixaban (ELIQUIS) 5 MG TABS tablet Take 1 tablet by mouth 2 times daily 60 tablet 2    benzonatate (TESSALON) 100 MG capsule Take 1 capsule by mouth 3 times daily as needed for Cough 30 capsule 1    guaiFENesin (MUCINEX) 600 MG extended release tablet Take 600 mg by mouth 2 times daily as needed for Congestion (Patient not taking: Reported on 2/16/2023)      Multiple Vitamins-Minerals (THERAPEUTIC MULTIVITAMIN-MINERALS) tablet Take 1 tablet by mouth daily (Patient not taking: Reported on 2/16/2023)       No current facility-administered medications for this visit.      Allergies   Allergen Reactions    Seasonal        Health Maintenance   Topic Date Due    Depression Screen  Never done    HIV screen  Never done    Hepatitis C screen  Never done    DTaP/Tdap/Td vaccine (1 - Tdap) Never done    Diabetes screen  Never done    Lipids  Never done    Colorectal Cancer Screen  Never done    Shingles vaccine (1 of 2) Never done    Flu vaccine (1) Never done    COVID-19 Vaccine  Completed    Hepatitis A vaccine  Aged Out    Hib vaccine  Aged Out    Meningococcal (ACWY) vaccine  Aged Out    Pneumococcal 0-64 years Vaccine  Aged Out       Subjective:      Review of Systems   Respiratory:  Positive for shortness of breath. All other systems reviewed and are negative. Objective:     Physical Exam  Vitals reviewed. Constitutional:       Appearance: He is well-developed. HENT:      Head: Normocephalic and atraumatic. Eyes:      Conjunctiva/sclera: Conjunctivae normal.      Pupils: Pupils are equal, round, and reactive to light. Neck:      Thyroid: No thyromegaly. Vascular: No JVD. Cardiovascular:      Rate and Rhythm: Normal rate and regular rhythm. Heart sounds: Normal heart sounds. No murmur heard. Pulmonary:      Effort: Pulmonary effort is normal.      Breath sounds: Normal breath sounds. Abdominal:      General: Bowel sounds are normal.      Palpations: Abdomen is soft. Musculoskeletal:         General: Normal range of motion. Cervical back: Normal range of motion and neck supple. Skin:     General: Skin is warm and dry. Neurological:      Mental Status: He is alert and oriented to person, place, and time. Deep Tendon Reflexes: Reflexes are normal and symmetric. /64 (Site: Right Upper Arm, Position: Sitting)   Pulse 90   Wt 237 lb (107.5 kg)   SpO2 96%   BMI 34.01 kg/m²       Assessment:       Diagnosis Orders   1. Hospital discharge follow-up  NV DISCHARGE MEDS RECONCILED W/ CURRENT OUTPATIENT MED LIST          Plan:      No follow-ups on file.     Orders Placed This Encounter   Medications    apixaban (ELIQUIS) 5 MG TABS tablet     Sig: Take 2 tablets by mouth 2 times daily for 12 doses     Dispense:  24 tablet     Refill:  0    apixaban (ELIQUIS) 5 MG TABS tablet     Sig: Take 1 tablet by mouth 2 times daily     Dispense:  60 tablet     Refill:  2     Orders Placed This Encounter   Procedures    NV DISCHARGE MEDS RECONCILED W/ CURRENT OUTPATIENT MED LIST     [facility] did not have the Eliquis. He is to check tomorrow. Will give him printed prescriptions if he needs to go somewhere else. Patient given educational materials - see patient instructions. Discussed use, benefit, and side effects of prescribed medications. All patientquestions answered. Pt voiced understanding.     Electronically signed by Luciano Kanner, MD on 2/16/2023at 3:10 PM

## 2023-02-20 ENCOUNTER — OFFICE VISIT (OUTPATIENT)
Dept: INTERNAL MEDICINE CLINIC | Age: 53
End: 2023-02-20

## 2023-02-20 ENCOUNTER — HOSPITAL ENCOUNTER (OUTPATIENT)
Dept: CT IMAGING | Age: 53
Discharge: HOME | End: 2023-02-22
Payer: COMMERCIAL

## 2023-02-20 ENCOUNTER — HOSPITAL ENCOUNTER (INPATIENT)
Age: 53
LOS: 3 days | Discharge: HOME | End: 2023-02-23
Attending: EMERGENCY MEDICINE | Admitting: INTERNAL MEDICINE
Payer: COMMERCIAL

## 2023-02-20 VITALS
HEIGHT: 70 IN | SYSTOLIC BLOOD PRESSURE: 118 MMHG | DIASTOLIC BLOOD PRESSURE: 70 MMHG | WEIGHT: 237 LBS | BODY MASS INDEX: 33.93 KG/M2

## 2023-02-20 DIAGNOSIS — I82.411 ACUTE DEEP VEIN THROMBOSIS (DVT) OF FEMORAL VEIN OF RIGHT LOWER EXTREMITY (HCC): ICD-10-CM

## 2023-02-20 DIAGNOSIS — I26.99 PULMONARY EMBOLISM WITHOUT ACUTE COR PULMONALE, UNSPECIFIED CHRONICITY, UNSPECIFIED PULMONARY EMBOLISM TYPE (HCC): ICD-10-CM

## 2023-02-20 DIAGNOSIS — R10.32 ABDOMINAL PAIN, LEFT LOWER QUADRANT: ICD-10-CM

## 2023-02-20 DIAGNOSIS — R19.00 RETROPERITONEAL MASS: Primary | ICD-10-CM

## 2023-02-20 DIAGNOSIS — Z79.01 ANTICOAGULATED: ICD-10-CM

## 2023-02-20 DIAGNOSIS — I26.99 PULMONARY EMBOLISM WITHOUT ACUTE COR PULMONALE, UNSPECIFIED CHRONICITY, UNSPECIFIED PULMONARY EMBOLISM TYPE (HCC): Primary | ICD-10-CM

## 2023-02-20 LAB
ABSOLUTE EOS #: 0 K/UL (ref 0–0.4)
ABSOLUTE LYMPH #: 2.3 K/UL (ref 1–4.8)
ABSOLUTE MONO #: 0.6 K/UL (ref 0.1–1.3)
ALBUMIN SERPL-MCNC: 4 G/DL (ref 3.5–5.2)
ALP SERPL-CCNC: 164 U/L (ref 40–129)
ALT SERPL-CCNC: 123 U/L (ref 5–41)
ANION GAP SERPL CALCULATED.3IONS-SCNC: 12 MMOL/L (ref 9–17)
AST SERPL-CCNC: 70 U/L
BASOPHILS # BLD: 1 % (ref 0–2)
BASOPHILS ABSOLUTE: 0 K/UL (ref 0–0.2)
BILIRUB DIRECT SERPL-MCNC: 0.3 MG/DL
BILIRUB INDIRECT SERPL-MCNC: 0.7 MG/DL (ref 0–1)
BILIRUB SERPL-MCNC: 1 MG/DL (ref 0.3–1.2)
BUN SERPL-MCNC: 11 MG/DL (ref 6–20)
CALCIUM SERPL-MCNC: 9 MG/DL (ref 8.6–10.4)
CHLORIDE SERPL-SCNC: 96 MMOL/L (ref 98–107)
CO2 SERPL-SCNC: 23 MMOL/L (ref 20–31)
CREAT SERPL-MCNC: 0.68 MG/DL (ref 0.7–1.2)
EOSINOPHILS RELATIVE PERCENT: 1 % (ref 0–4)
GFR SERPL CREATININE-BSD FRML MDRD: >60 ML/MIN/1.73M2
GLUCOSE SERPL-MCNC: 100 MG/DL (ref 70–99)
HCT VFR BLD AUTO: 44.3 % (ref 41–53)
HGB BLD-MCNC: 14.6 G/DL (ref 13.5–17.5)
INR PPP: 1.1
LYMPHOCYTES # BLD: 44 % (ref 24–44)
MCH RBC QN AUTO: 30.1 PG (ref 26–34)
MCHC RBC AUTO-ENTMCNC: 32.9 G/DL (ref 31–37)
MCV RBC AUTO: 91.3 FL (ref 80–100)
MONOCYTES # BLD: 12 % (ref 1–7)
PARTIAL THROMBOPLASTIN TIME: 33 SEC (ref 24–36)
PDW BLD-RTO: 14.8 % (ref 11.5–14.9)
PLATELET # BLD AUTO: 401 K/UL (ref 150–450)
PMV BLD AUTO: 6.2 FL (ref 6–12)
POTASSIUM SERPL-SCNC: 3.9 MMOL/L (ref 3.7–5.3)
PROT SERPL-MCNC: 7.9 G/DL (ref 6.4–8.3)
PROTHROMBIN TIME: 14 SEC (ref 11.8–14.6)
RBC # BLD: 4.86 M/UL (ref 4.5–5.9)
SEG NEUTROPHILS: 42 % (ref 36–66)
SEGMENTED NEUTROPHILS ABSOLUTE COUNT: 2.2 K/UL (ref 1.3–9.1)
SODIUM SERPL-SCNC: 131 MMOL/L (ref 135–144)
WBC # BLD AUTO: 5.2 K/UL (ref 3.5–11)

## 2023-02-20 PROCEDURE — 6360000002 HC RX W HCPCS: Performed by: STUDENT IN AN ORGANIZED HEALTH CARE EDUCATION/TRAINING PROGRAM

## 2023-02-20 PROCEDURE — 74177 CT ABD & PELVIS W/CONTRAST: CPT

## 2023-02-20 PROCEDURE — 85610 PROTHROMBIN TIME: CPT

## 2023-02-20 PROCEDURE — 99285 EMERGENCY DEPT VISIT HI MDM: CPT

## 2023-02-20 PROCEDURE — 36415 COLL VENOUS BLD VENIPUNCTURE: CPT

## 2023-02-20 PROCEDURE — 80076 HEPATIC FUNCTION PANEL: CPT

## 2023-02-20 PROCEDURE — 80048 BASIC METABOLIC PNL TOTAL CA: CPT

## 2023-02-20 PROCEDURE — 85730 THROMBOPLASTIN TIME PARTIAL: CPT

## 2023-02-20 PROCEDURE — 2580000003 HC RX 258: Performed by: INTERNAL MEDICINE

## 2023-02-20 PROCEDURE — 2580000003 HC RX 258: Performed by: NURSE PRACTITIONER

## 2023-02-20 PROCEDURE — 85025 COMPLETE CBC W/AUTO DIFF WBC: CPT

## 2023-02-20 PROCEDURE — 96374 THER/PROPH/DIAG INJ IV PUSH: CPT

## 2023-02-20 PROCEDURE — 6360000004 HC RX CONTRAST MEDICATION: Performed by: INTERNAL MEDICINE

## 2023-02-20 PROCEDURE — 2500000003 HC RX 250 WO HCPCS: Performed by: INTERNAL MEDICINE

## 2023-02-20 PROCEDURE — 96375 TX/PRO/DX INJ NEW DRUG ADDON: CPT

## 2023-02-20 PROCEDURE — 1200000000 HC SEMI PRIVATE

## 2023-02-20 RX ORDER — SODIUM CHLORIDE 0.9 % (FLUSH) 0.9 %
10 SYRINGE (ML) INJECTION PRN
Status: DISCONTINUED | OUTPATIENT
Start: 2023-02-20 | End: 2023-02-23 | Stop reason: HOSPADM

## 2023-02-20 RX ORDER — HEPARIN SODIUM 1000 [USP'U]/ML
40 INJECTION, SOLUTION INTRAVENOUS; SUBCUTANEOUS PRN
Status: DISCONTINUED | OUTPATIENT
Start: 2023-02-20 | End: 2023-02-22

## 2023-02-20 RX ORDER — ONDANSETRON 2 MG/ML
4 INJECTION INTRAMUSCULAR; INTRAVENOUS EVERY 6 HOURS PRN
Status: DISCONTINUED | OUTPATIENT
Start: 2023-02-20 | End: 2023-02-23 | Stop reason: HOSPADM

## 2023-02-20 RX ORDER — ACETAMINOPHEN 650 MG/1
650 SUPPOSITORY RECTAL EVERY 6 HOURS PRN
Status: DISCONTINUED | OUTPATIENT
Start: 2023-02-20 | End: 2023-02-23 | Stop reason: HOSPADM

## 2023-02-20 RX ORDER — METRONIDAZOLE 500 MG/1
500 TABLET ORAL 3 TIMES DAILY
Qty: 30 TABLET | Refills: 0 | Status: ON HOLD | OUTPATIENT
Start: 2023-02-20 | End: 2023-02-22 | Stop reason: HOSPADM

## 2023-02-20 RX ORDER — ACETAMINOPHEN 325 MG/1
650 TABLET ORAL EVERY 6 HOURS PRN
Status: DISCONTINUED | OUTPATIENT
Start: 2023-02-20 | End: 2023-02-23 | Stop reason: HOSPADM

## 2023-02-20 RX ORDER — SODIUM CHLORIDE 9 MG/ML
INJECTION, SOLUTION INTRAVENOUS CONTINUOUS
Status: DISCONTINUED | OUTPATIENT
Start: 2023-02-21 | End: 2023-02-23 | Stop reason: HOSPADM

## 2023-02-20 RX ORDER — SODIUM CHLORIDE 9 MG/ML
INJECTION, SOLUTION INTRAVENOUS PRN
Status: DISCONTINUED | OUTPATIENT
Start: 2023-02-20 | End: 2023-02-23 | Stop reason: HOSPADM

## 2023-02-20 RX ORDER — ONDANSETRON 2 MG/ML
4 INJECTION INTRAMUSCULAR; INTRAVENOUS ONCE
Status: COMPLETED | OUTPATIENT
Start: 2023-02-20 | End: 2023-02-20

## 2023-02-20 RX ORDER — SODIUM CHLORIDE 0.9 % (FLUSH) 0.9 %
5-40 SYRINGE (ML) INJECTION EVERY 12 HOURS SCHEDULED
Status: DISCONTINUED | OUTPATIENT
Start: 2023-02-21 | End: 2023-02-23 | Stop reason: HOSPADM

## 2023-02-20 RX ORDER — HEPARIN SODIUM 1000 [USP'U]/ML
80 INJECTION, SOLUTION INTRAVENOUS; SUBCUTANEOUS ONCE
Status: COMPLETED | OUTPATIENT
Start: 2023-02-20 | End: 2023-02-20

## 2023-02-20 RX ORDER — CIPROFLOXACIN 500 MG/1
500 TABLET, FILM COATED ORAL 2 TIMES DAILY
Qty: 20 TABLET | Refills: 0 | Status: ON HOLD | OUTPATIENT
Start: 2023-02-20 | End: 2023-02-22 | Stop reason: HOSPADM

## 2023-02-20 RX ORDER — 0.9 % SODIUM CHLORIDE 0.9 %
100 INTRAVENOUS SOLUTION INTRAVENOUS ONCE
Status: COMPLETED | OUTPATIENT
Start: 2023-02-20 | End: 2023-02-20

## 2023-02-20 RX ORDER — HEPARIN SODIUM 1000 [USP'U]/ML
80 INJECTION, SOLUTION INTRAVENOUS; SUBCUTANEOUS PRN
Status: DISCONTINUED | OUTPATIENT
Start: 2023-02-20 | End: 2023-02-22

## 2023-02-20 RX ORDER — POLYETHYLENE GLYCOL 3350 17 G/17G
17 POWDER, FOR SOLUTION ORAL DAILY PRN
Status: DISCONTINUED | OUTPATIENT
Start: 2023-02-20 | End: 2023-02-23 | Stop reason: HOSPADM

## 2023-02-20 RX ORDER — HYDROCODONE BITARTRATE AND ACETAMINOPHEN 5; 325 MG/1; MG/1
1 TABLET ORAL 3 TIMES DAILY PRN
Status: DISCONTINUED | OUTPATIENT
Start: 2023-02-20 | End: 2023-02-23 | Stop reason: HOSPADM

## 2023-02-20 RX ORDER — HEPARIN SODIUM 10000 [USP'U]/100ML
5-30 INJECTION, SOLUTION INTRAVENOUS CONTINUOUS
Status: DISCONTINUED | OUTPATIENT
Start: 2023-02-20 | End: 2023-02-22

## 2023-02-20 RX ORDER — ONDANSETRON 4 MG/1
4 TABLET, ORALLY DISINTEGRATING ORAL EVERY 8 HOURS PRN
Status: DISCONTINUED | OUTPATIENT
Start: 2023-02-20 | End: 2023-02-23 | Stop reason: HOSPADM

## 2023-02-20 RX ORDER — HYDROCODONE BITARTRATE AND ACETAMINOPHEN 5; 325 MG/1; MG/1
1 TABLET ORAL 3 TIMES DAILY PRN
Qty: 9 TABLET | Refills: 0 | Status: SHIPPED | OUTPATIENT
Start: 2023-02-20 | End: 2023-02-23

## 2023-02-20 RX ORDER — SODIUM CHLORIDE 0.9 % (FLUSH) 0.9 %
5-40 SYRINGE (ML) INJECTION PRN
Status: DISCONTINUED | OUTPATIENT
Start: 2023-02-20 | End: 2023-02-23 | Stop reason: HOSPADM

## 2023-02-20 RX ADMIN — SODIUM CHLORIDE, PRESERVATIVE FREE 10 ML: 5 INJECTION INTRAVENOUS at 12:32

## 2023-02-20 RX ADMIN — HEPARIN SODIUM 18 UNITS/KG/HR: 10000 INJECTION, SOLUTION INTRAVENOUS at 19:55

## 2023-02-20 RX ADMIN — SODIUM CHLORIDE 100 ML: 9 INJECTION, SOLUTION INTRAVENOUS at 12:32

## 2023-02-20 RX ADMIN — IOPAMIDOL 75 ML: 755 INJECTION, SOLUTION INTRAVENOUS at 12:31

## 2023-02-20 RX ADMIN — BARIUM SULFATE 450 ML: 20 SUSPENSION ORAL at 12:30

## 2023-02-20 RX ADMIN — ONDANSETRON 4 MG: 2 INJECTION INTRAMUSCULAR; INTRAVENOUS at 19:24

## 2023-02-20 RX ADMIN — HEPARIN SODIUM 8600 UNITS: 1000 INJECTION, SOLUTION INTRAVENOUS; SUBCUTANEOUS at 19:50

## 2023-02-20 RX ADMIN — SODIUM CHLORIDE: 9 INJECTION, SOLUTION INTRAVENOUS at 23:42

## 2023-02-20 SDOH — ECONOMIC STABILITY: FOOD INSECURITY: WITHIN THE PAST 12 MONTHS, THE FOOD YOU BOUGHT JUST DIDN'T LAST AND YOU DIDN'T HAVE MONEY TO GET MORE.: NEVER TRUE

## 2023-02-20 SDOH — ECONOMIC STABILITY: HOUSING INSECURITY
IN THE LAST 12 MONTHS, WAS THERE A TIME WHEN YOU DID NOT HAVE A STEADY PLACE TO SLEEP OR SLEPT IN A SHELTER (INCLUDING NOW)?: NO

## 2023-02-20 SDOH — ECONOMIC STABILITY: INCOME INSECURITY: HOW HARD IS IT FOR YOU TO PAY FOR THE VERY BASICS LIKE FOOD, HOUSING, MEDICAL CARE, AND HEATING?: NOT HARD AT ALL

## 2023-02-20 SDOH — ECONOMIC STABILITY: FOOD INSECURITY: WITHIN THE PAST 12 MONTHS, YOU WORRIED THAT YOUR FOOD WOULD RUN OUT BEFORE YOU GOT MONEY TO BUY MORE.: NEVER TRUE

## 2023-02-20 ASSESSMENT — ENCOUNTER SYMPTOMS
DIARRHEA: 0
CONSTIPATION: 0
BACK PAIN: 0
NAUSEA: 1
VOMITING: 0
ANAL BLEEDING: 0
RECTAL PAIN: 0
BLOOD IN STOOL: 0
SHORTNESS OF BREATH: 1
ABDOMINAL PAIN: 1
NAUSEA: 0
VOMITING: 0
COUGH: 1
SHORTNESS OF BREATH: 0
ABDOMINAL PAIN: 1
ALLERGIC/IMMUNOLOGIC NEGATIVE: 1

## 2023-02-20 ASSESSMENT — PAIN DESCRIPTION - LOCATION: LOCATION: ABDOMEN

## 2023-02-20 ASSESSMENT — LIFESTYLE VARIABLES
HOW MANY STANDARD DRINKS CONTAINING ALCOHOL DO YOU HAVE ON A TYPICAL DAY: PATIENT DOES NOT DRINK
HOW OFTEN DO YOU HAVE A DRINK CONTAINING ALCOHOL: NEVER

## 2023-02-20 ASSESSMENT — PAIN DESCRIPTION - ORIENTATION: ORIENTATION: LEFT;LOWER

## 2023-02-20 ASSESSMENT — PAIN DESCRIPTION - DESCRIPTORS: DESCRIPTORS: ACHING;DISCOMFORT

## 2023-02-20 ASSESSMENT — PAIN SCALES - GENERAL: PAINLEVEL_OUTOF10: 1

## 2023-02-20 NOTE — LETTER
[address] [address]  Phone: [phone]             February 23, 2023    Patient: Peewee Rucker   YOB: 1970   Date of Visit: 2/20/2023       To Whom It May Concern:    Jalyn Cerda was seen and treated in our facility beginning 2/20/2023 until 2/23/2023  . He may return to work on Monday 2/27/2023.       Sincerely,       Mari Morris RN         Signature:__________________________________

## 2023-02-20 NOTE — LETTER
February 23, 2023      Ambika Olivares,    Breast cancer is the second most common cancer among women in the [place] and results in nearly 40,000 women deaths each year. Early detection, including mammograms, is the key to survival and [facility] ([facility]) would like to remind you to put your health first.     Did you know that breast cancer can hide early on, so by the time you feel a lump during a self-exam, it can be harder to treat? The best thing you can do is get a screening mammogram every 24 months. We are contacting you because our records show that you may need a mammogram.    Most women say the procedure is much easier than they expected, and are glad to have gotten it done. Mammograms are covered by most health insurance programs with little or no cost to you. To schedule an appointment for a mammogram, please click [email or web address]] or call your primary care doctors office. If you have already had your mammogram in the past 24 months, great job! Please click [email or web address] to send a [employer] message or call your primary care physicians office with when (month and year) and where your mammogram was completed. This will allow us to obtain a copy of your results and update your medical records. Thanks! Please click <a href=\" [email or web address]">here</a> and contact your doctors office to set up an appointment to discuss your options.       Sincerely,      Your Healthcare Team

## 2023-02-20 NOTE — ED PROVIDER NOTES
[address]  Emergency Department Encounter  Emergency Medicine Resident     Pt Reina Duran  MRN: [id number]  [place] 1970  Date of evaluation: 2/20/23  PCP:  James Cerda MD  Note Started: 6:56 PM EST      CHIEF COMPLAINT       Chief Complaint   Patient presents with    Abnormal CT       HISTORY OF PRESENT ILLNESS  (Location/Symptom, Timing/Onset, Context/Setting, Quality, Duration, Modifying Factors, Severity.)      Lorne Quinteros is a 46 y.o. male who presents with concern for left-sided retroperitoneal mass found on outpatient CT scan today. Patient states he went to have an appointment with his PCP today as he has been having issues with nausea and feeling very full after he is eating. Also had vague left lower quadrant abdominal pain. Patient was found to have a PE on 2/14 and recently started on Eliquis. Last took his Eliquis this morning. Primary care provider called ER and would like patient to be admitted and started on heparin so they can safely do an IR guided biopsy of the mass for further evaluation as the CT is concerning for a possible liposarcoma. Patient denies prior history of cancer. Patient has no other complaints at this time other than feeling mildly nauseous which has been a chronic issue for him. Patient also believes he lost approximately 18 pounds in the last 2 weeks. PAST MEDICAL / SURGICAL / SOCIAL / FAMILY HISTORY      has no past medical history on file. has no past surgical history on file.       Social History     Socioeconomic History    Marital status: Single     Spouse name: Not on file    Number of children: Not on file    Years of education: Not on file    Highest education level: Not on file   Occupational History    Not on file   Tobacco Use    Smoking status: Never    Smokeless tobacco: Never   Substance and Sexual Activity    Alcohol use: Not Currently    Drug use: Not Currently     Types: Marijuana Al[place] Allyson)    Sexual activity: Not on file   Other Topics Concern    Not on file   Social History Narrative    Not on file     Social Determinants of Health     Financial Resource Strain: Low Risk     Difficulty of Paying Living Expenses: Not hard at all   Food Insecurity: No Food Insecurity    Worried About [address] in the Last Year: Never true    [address] in the Last Year: Never true   Transportation Needs: Unknown    Lack of Transportation (Medical): Not on file    Lack of Transportation (Non-Medical): No   Physical Activity: Not on file   Stress: Not on file   Social Connections: Not on file   Intimate Partner Violence: Not on file   Housing Stability: Unknown    Unable to Pay for Housing in the Last Year: Not on file    Number of Places Lived in the Last Year: Not on file    Unstable Housing in the Last Year: No       No family history on file. Allergies:  Seasonal    Home Medications:  Prior to Admission medications    Medication Sig Start Date End Date Taking? Authorizing Provider   ciprofloxacin (CIPRO) 500 MG tablet Take 1 tablet by mouth 2 times daily for 10 days 2/20/23 3/2/23  Harpal Velasquez MD   metroNIDAZOLE (FLAGYL) 500 MG tablet Take 1 tablet by mouth 3 times daily for 10 days 2/20/23 3/2/23  Harpal Velasquez MD   HYDROcodone-acetaminophen (NORCO) 5-325 MG per tablet Take 1 tablet by mouth 3 times daily as needed for Pain for up to 3 days. Intended supply: 3 days.  Take lowest dose possible to manage pain Max Daily Amount: 3 tablets 2/20/23 2/23/23  Harpal Velasquez MD   apixaban (ELIQUIS) 5 MG TABS tablet Take 2 tablets by mouth 2 times daily for 12 doses 2/16/23 2/22/23  Dania Paget, MD   apixaban [facility]) 5 MG TABS tablet Take 1 tablet by mouth 2 times daily 2/21/23   Dania Paget, MD   benzonatate (TESSALON) 100 MG capsule Take 1 capsule by mouth 3 times daily as needed for Cough  Patient not taking: Reported on 2/20/2023 2/15/23 2/22/23  DO prateek LakeaiFENesin ([address]) 600 MG extended release tablet Take 600 mg by mouth 2 times daily as needed for Congestion    Historical Provider, MD   Multiple Vitamins-Minerals (THERAPEUTIC MULTIVITAMIN-MINERALS) tablet Take 1 tablet by mouth daily    Historical Provider, MD       REVIEW OF SYSTEMS       Review of Systems   Constitutional:  Positive for unexpected weight change. HENT:  Negative for congestion. Respiratory:  Negative for shortness of breath. Cardiovascular:  Negative for chest pain. Gastrointestinal:  Positive for abdominal pain and nausea. Negative for vomiting. Genitourinary:  Negative for dysuria. Musculoskeletal:  Negative for back pain and neck pain. Skin:  Negative for rash. Neurological:  Negative for headaches. Hematological:  Bruises/bleeds easily. Psychiatric/Behavioral:  Negative for confusion. PHYSICAL EXAM      INITIAL VITALS:   BP (!) 113/93   Pulse 89   Temp 97 °F (36.1 °C) (Oral)   Resp 18   SpO2 95%     Physical Exam  Vitals reviewed. Constitutional:       General: He is not in acute distress. Appearance: Normal appearance. He is not ill-appearing. HENT:      Head: Normocephalic and atraumatic. Right Ear: External ear normal.      Left Ear: External ear normal.      Nose: Nose normal.      Mouth/Throat:      Mouth: Mucous membranes are moist.   Eyes:      General:         Right eye: No discharge. Left eye: No discharge. Cardiovascular:      Rate and Rhythm: Normal rate and regular rhythm. Pulmonary:      Effort: Pulmonary effort is normal. No respiratory distress. Abdominal:      General: There is no distension. Palpations: Abdomen is soft. Comments: Mild tenderness to palpation in left lower quadrant. No rebound or guarding. Abdomen soft. Musculoskeletal:      Cervical back: Normal range of motion. Comments: Moving all 4 extremities   Skin:     General: Skin is warm. Capillary Refill: Capillary refill takes less than 2 seconds. Neurological:      General: No focal deficit present. Mental Status: He is alert. Psychiatric:         Mood and Affect: Mood normal.         DDX/DIAGNOSTIC RESULTS / EMERGENCY DEPARTMENT COURSE / MDM     Medical Decision Making  DDx: Abnormal CT, possible liposarcoma, recent PE, anticoagulated    70-year-old male presents after having abnormal outpatient CT scan. Having vague left lower quad abdominal pain, nausea, recent weight loss. Found to have large left retroperitoneal mass. Currently taking Eliquis, last took today, for recent PE. PCP wanted patient admitted here to be transition to heparin to safely perform IR guided biopsy of mass. Patient appears well initial evaluation, afebrile, stable vital signs. We will plan to obtain basic labs. We will start patient on heparin drip. IR and hematology consulted for patient's visit here. Medicine consulted for admission. Problems Addressed:  Retroperitoneal mass: acute illness or injury    Amount and/or Complexity of Data Reviewed  External Data Reviewed: radiology. Details: CT abdomen pelvis from 2/20/23  Labs: ordered. Risk  Prescription drug management. Decision regarding hospitalization. EKG  None    All EKG's are interpreted by the Emergency Department Physician who either signs or Co-signs this chart in the absence of a cardiologist.    EMERGENCY DEPARTMENT COURSE:      ED Course as of 02/20/23 2003 Mon Feb 20, 2023 1936 Discussed case with medicine. Agree to admit patient. [AB]   1937 Discussed case with oncology. Agree with plan. Agree to see patient while he is inpatient. [AB]      ED Course User Index  [AB] Eligio Hinton,        PROCEDURES:  None    CONSULTS:  IP CONSULT TO INTERNAL MEDICINE  IP CONSULT TO ONCOLOGY    CRITICAL CARE:  There was significant risk of life threatening deterioration of patient's condition requiring my direct management.  Critical care time 0 minutes, excluding any documented procedures. FINAL IMPRESSION      1. Retroperitoneal mass    2. Anticoagulated          DISPOSITION / PLAN     DISPOSITION Decision To Admit 02/20/2023 07:04:06 PM      PATIENT REFERRED TO:  No follow-up provider specified.     DISCHARGE MEDICATIONS:  New Prescriptions    No medications on file       Adrianna Holder DO  Emergency Medicine Resident    (Please note that portions of thisnote were completed with a voice recognition program.  Efforts were made to edit the dictations but occasionally words are mis-transcribed.)        Rory Ford DO  Resident  02/20/23 2003

## 2023-02-20 NOTE — LETTER
[address] [facility] [address]  Phone: [phone]             February 23, 2023    Patient: Bere Quintana   YOB: 1970   Date of Visit: 2/20/2023       To Whom It May Concern:    Elton Velez was seen and treated in our facility  beginning 2/20/2023 until 2/23/2023 . He may return to work on Monday 2/27/2023.       Sincerely,       Makenzie Manzo RN         Signature:__________________________________

## 2023-02-20 NOTE — PROGRESS NOTES
Subjective:      Patient ID: Syl Lincoln is a 46 y.o. male. Patient was admitted to [facility] on February 14, 2023 he presented with progressive dyspnea and he was found to have bilateral PE which was significant , he had a right lower extremity DVT involving the deep vessels,  During his hospital stay his echocardiogram did not show any evidence of left ventricular strain normal  or right ventricular strain  Today he has presented with abdominal pain in the left lower quadrant, it is 5-day post discharge from the hospital and he denied any chest pain or progressive shortness of breath. He denied any blood in his stools or urine. It Is to be noted that this was unprovoked right lower extremity DVT and PE. And any previous major illness      Review of Systems   Constitutional:  Positive for fatigue. Respiratory:  Positive for cough and shortness of breath. Gastrointestinal:  Positive for abdominal pain. Negative for anal bleeding, blood in stool, constipation, diarrhea, nausea, rectal pain and vomiting. Endocrine: Negative. Genitourinary: Negative. Musculoskeletal: Negative. Skin: Negative. Allergic/Immunologic: Negative. Neurological: Negative. Hematological: Negative. Psychiatric/Behavioral: Negative. Objective:   Physical Exam  Constitutional:       Appearance: He is obese. Cardiovascular:      Rate and Rhythm: Normal rate and regular rhythm. Heart sounds: No murmur heard. Pulmonary:      Effort: No respiratory distress. Breath sounds: No stridor. No wheezing or rhonchi. Abdominal:      Palpations: There is no mass. Tenderness: There is abdominal tenderness. Hernia: No hernia is present. Comments: Left lower quadrant tenderness   Lymphadenopathy:      Cervical: No cervical adenopathy. Neurological:      Mental Status: He is alert. Assessment / Plan:      Diagnosis Orders   1.  Pulmonary embolism without acute cor pulmonale, unspecified chronicity, unspecified pulmonary embolism type (HCC) = continue taking Eliquis 10 mg p.o. twice daily we will switch to milligram twice daily in 2 days       2. Acute deep vein thrombosis (DVT) of femoral vein of right lower extremity (HCC) = he had no calf or thigh tenderness and Homans' sign       3. Abdominal pain, left lower quadrant = is my impression that he could have acute diverticulitis, it is also probable that he might have underlying intra-abdominal malignancy which will explain his idiopathic DVT and a pulmonary embolus       No follow-ups on file. Orders Placed This Encounter   Procedures    CT ABDOMEN PELVIS W IV CONTRAST     Order Specific Question:   STAT Creatinine as needed:     Answer:   No     Orders Placed This Encounter   Medications    ciprofloxacin (CIPRO) 500 MG tablet     Sig: Take 1 tablet by mouth 2 times daily for 10 days     Dispense:  20 tablet     Refill:  0    metroNIDAZOLE (FLAGYL) 500 MG tablet     Sig: Take 1 tablet by mouth 3 times daily for 10 days     Dispense:  30 tablet     Refill:  0    HYDROcodone-acetaminophen (NORCO) 5-325 MG per tablet     Sig: Take 1 tablet by mouth 3 times daily as needed for Pain for up to 3 days. Intended supply: 3 days. Take lowest dose possible to manage pain Max Daily Amount: 3 tablets     Dispense:  9 tablet     Refill:  0     Reduce doses taken as pain becomes manageable    Is that I will requested stat CT abdomen and pelvis with contrast, he will be started on Cipro and Flagyl and he was also given Norco for pain  Visit Information    Have you changed or started any medications since your last visit including any over-the-counter medicines, vitamins, or herbal medicines? no   Are you having any side effects from any of your medications? -  no  Have you stopped taking any of your medications? Is so, why? -  no    Have you seen any other physician or provider since your last visit?  No  Have you had any other diagnostic tests since your last visit? No  Have you been seen in the emergency room and/or had an admission to a hospital since we last saw you? No  Have you had your routine dental cleaning in the past 6 months? no    Have you activated your [employer] account? If not, what are your barriers?  No:      Patient Care Team:  Lucien Garcia MD as PCP - General (Internal Medicine)  Lucien Garcia MD as PCP - Empaneled Provider    Medical History Review  Past Medical, Family, and Social History reviewed and does not contribute to the patient presenting condition    Health Maintenance   Topic Date Due    HIV screen  Never done    Hepatitis C screen  Never done    DTaP/Tdap/Td vaccine (1 - Tdap) Never done    Diabetes screen  Never done    Lipids  Never done    Colorectal Cancer Screen  Never done    Shingles vaccine (1 of 2) Never done    Flu vaccine (1) Never done    Depression Screen  02/16/2024    COVID-19 Vaccine  Completed    Hepatitis A vaccine  Aged Out    Hib vaccine  Aged Out    Meningococcal (ACWY) vaccine  Aged Out    Pneumococcal 0-64 years Vaccine  Aged Out

## 2023-02-21 ENCOUNTER — APPOINTMENT (OUTPATIENT)
Dept: CT IMAGING | Age: 53
End: 2023-02-21
Payer: COMMERCIAL

## 2023-02-21 LAB
ABSOLUTE EOS #: 0 K/UL (ref 0–0.4)
ABSOLUTE LYMPH #: 2.27 K/UL (ref 1–4.8)
ABSOLUTE MONO #: 0.67 K/UL (ref 0.1–1.3)
ANION GAP SERPL CALCULATED.3IONS-SCNC: 6 MMOL/L (ref 9–17)
ATYPICAL LYMPHOCYTE ABSOLUTE COUNT: 0.05 K/UL
ATYPICAL LYMPHOCYTES: 1 %
BASOPHILS # BLD: 0 % (ref 0–2)
BASOPHILS ABSOLUTE: 0 K/UL (ref 0–0.2)
BUN SERPL-MCNC: 9 MG/DL (ref 6–20)
CALCIUM SERPL-MCNC: 8.7 MG/DL (ref 8.6–10.4)
CHLORIDE SERPL-SCNC: 101 MMOL/L (ref 98–107)
CO2 SERPL-SCNC: 27 MMOL/L (ref 20–31)
CREAT SERPL-MCNC: 0.57 MG/DL (ref 0.7–1.2)
EOSINOPHILS RELATIVE PERCENT: 0 % (ref 0–4)
GFR SERPL CREATININE-BSD FRML MDRD: >60 ML/MIN/1.73M2
GLUCOSE SERPL-MCNC: 110 MG/DL (ref 70–99)
HCT VFR BLD AUTO: 42.4 % (ref 41–53)
HGB BLD-MCNC: 13.8 G/DL (ref 13.5–17.5)
LYMPHOCYTES # BLD: 48 % (ref 24–44)
MCH RBC QN AUTO: 29.6 PG (ref 26–34)
MCHC RBC AUTO-ENTMCNC: 32.6 G/DL (ref 31–37)
MCV RBC AUTO: 90.9 FL (ref 80–100)
MONOCYTES # BLD: 14 % (ref 1–7)
MORPHOLOGY: ABNORMAL
NUCLEATED RED BLOOD CELLS: 1 PER 100 WBC
PARTIAL THROMBOPLASTIN TIME: 108.1 SEC (ref 24–36)
PARTIAL THROMBOPLASTIN TIME: 68.8 SEC (ref 24–36)
PARTIAL THROMBOPLASTIN TIME: 79.4 SEC (ref 24–36)
PARTIAL THROMBOPLASTIN TIME: >150 SEC (ref 24–36)
PDW BLD-RTO: 15.1 % (ref 11.5–14.9)
PLATELET # BLD AUTO: 380 K/UL (ref 150–450)
PMV BLD AUTO: 6.1 FL (ref 6–12)
POTASSIUM SERPL-SCNC: 4.2 MMOL/L (ref 3.7–5.3)
RBC # BLD: 4.66 M/UL (ref 4.5–5.9)
SEG NEUTROPHILS: 37 % (ref 36–66)
SEGMENTED NEUTROPHILS ABSOLUTE COUNT: 1.76 K/UL (ref 1.3–9.1)
SODIUM SERPL-SCNC: 134 MMOL/L (ref 135–144)
WBC # BLD AUTO: 4.8 K/UL (ref 3.5–11)

## 2023-02-21 PROCEDURE — 85730 THROMBOPLASTIN TIME PARTIAL: CPT

## 2023-02-21 PROCEDURE — 70450 CT HEAD/BRAIN W/O DYE: CPT

## 2023-02-21 PROCEDURE — 6360000002 HC RX W HCPCS: Performed by: STUDENT IN AN ORGANIZED HEALTH CARE EDUCATION/TRAINING PROGRAM

## 2023-02-21 PROCEDURE — 80048 BASIC METABOLIC PNL TOTAL CA: CPT

## 2023-02-21 PROCEDURE — 85025 COMPLETE CBC W/AUTO DIFF WBC: CPT

## 2023-02-21 PROCEDURE — 36415 COLL VENOUS BLD VENIPUNCTURE: CPT

## 2023-02-21 PROCEDURE — 99255 IP/OBS CONSLTJ NEW/EST HI 80: CPT | Performed by: INTERNAL MEDICINE

## 2023-02-21 PROCEDURE — 99223 1ST HOSP IP/OBS HIGH 75: CPT | Performed by: INTERNAL MEDICINE

## 2023-02-21 PROCEDURE — 2580000003 HC RX 258: Performed by: NURSE PRACTITIONER

## 2023-02-21 PROCEDURE — 1200000000 HC SEMI PRIVATE

## 2023-02-21 RX ADMIN — HEPARIN SODIUM 13 UNITS/KG/HR: 10000 INJECTION, SOLUTION INTRAVENOUS at 11:21

## 2023-02-21 RX ADMIN — SODIUM CHLORIDE, PRESERVATIVE FREE 10 ML: 5 INJECTION INTRAVENOUS at 23:00

## 2023-02-21 RX ADMIN — SODIUM CHLORIDE: 9 INJECTION, SOLUTION INTRAVENOUS at 11:22

## 2023-02-21 ASSESSMENT — PAIN SCALES - GENERAL
PAINLEVEL_OUTOF10: 0
PAINLEVEL_OUTOF10: 2
PAINLEVEL_OUTOF10: 0

## 2023-02-21 ASSESSMENT — PAIN DESCRIPTION - ORIENTATION: ORIENTATION: MID;LOWER

## 2023-02-21 ASSESSMENT — PAIN DESCRIPTION - LOCATION: LOCATION: BACK

## 2023-02-21 ASSESSMENT — PAIN DESCRIPTION - DESCRIPTORS: DESCRIPTORS: ACHING;DISCOMFORT

## 2023-02-21 NOTE — PROGRESS NOTES
Facts:  Patient known to writer from previous admission; patient talked about the medical circumstances that brought him back to the hospital; patient also shared life review and discussed his education, occupations and being a world traveler; got medical update from MAXIMO Redmond;    Feelings: Patient in shock regarding being told he might have cancer; patient worried and realistic as he waits on further medical testing and medical answers; patient wondering about physical limitations he may have and how his life will be affected by doctors findings; Family: Patient is not  and has no adult children; patient has a mother -- his father  a few years ago; patient also has two step-brothers; patient does not want to \"alarm anyone\" about what he is going through until he has more answers;     Vivienne: Patient has a very broad view of God that is not definable by any conventional terms. Follow-up:  SC remains available         23 1539   Encounter Summary   Encounter Overview/Reason  Spiritual/Emotional Needs   Service Provided For: Patient   Referral/Consult From: [facility] Parent; Family members   Last Encounter  23   Complexity of Encounter Moderate   Spiritual/Emotional needs   Type Difficult news received   Grief, Loss, and Adjustments   Type Adjustment to illness   Assessment/Intervention/Outcome   Assessment Anxious; Coping; Hopeful;Powerlessness; Shock   Intervention Active listening;Discussed belief system/Religion practices/vivienne;Discussed illness injury and its impact; Explored/Affirmed feelings, thoughts, concerns;Sustaining Presence/Ministry of presence   Outcome Comfort;Coping;Engaged in conversation;Expressed feelings, needs, and concerns;Expressed Gratitude;Receptive

## 2023-02-21 NOTE — ED PROVIDER NOTES
EMERGENCY DEPARTMENT ENCOUNTER   ATTENDING ATTESTATION     Pt Name: Tru Smith  MRN: [id number]  [place] 1970  Date of evaluation: 2/20/23       Tru Smith is a 46 y.o. male who presents with Abnormal CT      MDM: 59-year-old male presents for abnormal CT scan. Patient was recently admitted found to have bilateral PEs and DVT he was started on Eliquis and was discharged home with follow-up with his PCP after he was reporting abdominal pain and weight loss CT scan was performed and was reviewed showing no left-sided retroperitoneal mass    PCP wanted patient to be admitted for oncology evaluation and biopsy would like him to switch to a heparin drip to continue treating the blood clots    On initial exam patient no acute distress vital stable abdomen is soft he does have some left-sided tenderness, will check labs, CT was reviewed    Labs reviewed and unremarkable, he was started on a heparin drip per PCP we will admit for further evaluation and work-up    Spoke with [facility] NP for Dr. Lalo Xiao who accepts admission with oncology/IR consult. Patient demonstrates understanding and agreement with the plan, was given the opportunity to ask questions, and these questions were answered to the best of the provided information at this time. VS stable for transfer. This dictation was prepared using [facility] [employer] voice recognition software. Vitals:   Vitals:    02/20/23 1940 02/20/23 2030 02/20/23 2105 02/20/23 2216   BP: (!) 113/93 132/87 139/88    Pulse: 89 90 89    Resp: 18 17 18    Temp: 97 °F (36.1 °C)  97.3 °F (36.3 °C)    TempSrc: Oral  Oral    SpO2: 95% 94% 97%    Weight:    233 lb 11 oz (106 kg)   Height:    5' 10\" (1.778 m)         I personally saw and examined the patient. I have reviewed and agree with the resident's findings, including all diagnostic interpretations and treatment plan as written.  I was present for the key portions of any procedures performed and the inclusive time noted for any critical care statement. The care is provided during an unprecedented national emergency due to the novel coronavirus, COVID 19.   Domi Weinberg DO  Attending Emergency Physician           Domi Weinberg DO  02/20/23 4887

## 2023-02-21 NOTE — CARE COORDINATION
Case Management Assessment  Initial Evaluation    Date/Time of Evaluation: 2/21/2023 11:38 AM  Assessment Completed by: Jone Rosales RN    If patient is discharged prior to next notation, then this note serves as note for discharge by case management. Patient Name: Radha Del Valle                   YOB: 1970  Diagnosis: Retroperitoneal mass [R19.00]  Anticoagulated [Z79.01]  Intraabdominal mass [R19.00]                   Date / Time: 2/20/2023  6:56 PM    Patient Admission Status: Inpatient   Readmission Risk (Low < 19, Mod (19-27), High > 27): Readmission Risk Score: 9.9    Current PCP: Amelia Krueger MD  PCP verified by CM? Yes    Chart Reviewed: Yes      History Provided by: Patient  Patient Orientation: Alert and Oriented    Patient Cognition:      Hospitalization in the last 30 days (Readmission):  Yes    If yes, Readmission Assessment in CM Navigator will be completed. Advance Directives:      Code Status: Full Code   Patient's Primary Decision Maker is:        Discharge Planning:    Patient lives with: Alone Type of Home: House  Primary Care Giver: Self  Patient Support Systems include: Parent   Current Financial resources:    Current community resources:    Current services prior to admission: None            Current DME:              Type of Home Care services:  None    ADLS  Prior functional level: Independent in ADLs/IADLs  Current functional level: Independent in ADLs/IADLs    PT AM-PAC:   /24  OT AM-PAC:   /24    Family can provide assistance at DC: Yes  Would you like Case Management to discuss the discharge plan with any other family members/significant others, and if so, who?  No  Plans to Return to Present Housing: Yes  Other Identified Issues/Barriers to RETURNING to current housing: NA  Potential Assistance needed at discharge: N/A            Potential DME:    Patient expects to discharge to: [address] [place] [address] for transportation at discharge: [address] [place] [address] [employer] / Plan: [employer] [address] / Product Type: *No Product type* /     Does insurance require precert for SNF: Yes    Potential assistance Purchasing Medications: No  Meds-to-Beds request:        Maikel Mars [address], [address] [address] [facility] [address]  Phone: [phone] Fax: [phone]      Notes:    Factors facilitating achievement of predicted outcomes: Family support, Cooperative, and Pleasant    Barriers to discharge: NA    Additional Case Management Notes: 2/21/23, Milly, [facility]. Lives alone in 1 story home. No DME,Denies VNS, Eliquis PTA, Heparin GTT. Lt Retroperitoneal Mass, Needs IR BX, Hemoc consult, Lost 18 LBS in 2 weeks. Denies needs at this time,Will follow//KB    The Plan for Transition of Care is related to the following treatment goals of Retroperitoneal mass [R19.00]  Anticoagulated [Z79.01]  Intraabdominal mass [O96.34]    IF APPLICABLE: The Patient and/or patient representative Evelyn Hsieh and his family were provided with a choice of provider and agrees with the discharge plan. Freedom of choice list with basic dialogue that supports the patient's individualized plan of care/goals and shares the quality data associated with the providers was provided to:     Patient Representative Name:       The Patient and/or Patient Representative Agree with the Discharge Plan?       Saurav Gongora RN  Case Management Department  Ph: [phone] Fax: [phone]

## 2023-02-21 NOTE — ED NOTES
TRANSFER - OUT REPORT:    Verbal report given to [facility] on Dallin Hall  being transferred to 2061 for routine progression of patient care       Report consisted of patient's Situation, Background, Assessment and   Recommendations(SBAR). Information from the following report(s) ED Encounter Summary was reviewed with the receiving nurse. [place] Assessment: Presents to emergency department  because of falls (Syncope, seizure, or loss of consciousness): No, Age > 79: No, Altered Mental Status, Intoxication with alcohol or substance confusion (Disorientation, impaired judgment, poor safety awaremess, or inability to follow instructions): No, Impaired Mobility: Ambulates or transfers with assistive devices or assistance; Unable to ambulate or transer.: No, Nursing Judgement: Yes  Lines:   Peripheral IV 02/20/23 Right Antecubital (Active)        Opportunity for questions and clarification was provided.       Patient transported with:  Daniel Escobar RN  62/08/53 [id number]

## 2023-02-21 NOTE — PROGRESS NOTES
[facility] Internal Medicine  Alirio Perkins MD; Sin Acuña MD; Celena Yeh MD; Erling Mcardle, MD Larance Lah, MD; Amanda Kuo MD  [facility]   [address] [facility] [place] [facility] [place]                 Date:   2/20/2023  Patientname:  Brianna Antunez  Date of admission:  2/20/2023  6:56 PM  MRN:   [id number]  Account:  [de-identified]  YOB: 1970  PCP:    Lucien Garcia MD  Room:   2061  Code Status:    Full code       Chief Complaint:     Chief Complaint   Patient presents with    Abnormal CT       History of Present Illness:     Brianna Antunez is a 46 y.o. Non- / non  male who presents with Abnormal CT and is admitted to the hospital for the management of Intraabdominal mass. Patient was recently admitted for bilateral PE and DVT on 2/14/2023. He was discharged home on Eliquis. Today he was seen by PCP for abdominal pain in left lower quadrant, nausea and weight loss. CT scan was completed which revealed large left retroperitoneal mass. Oncology and IR consulted for biopsy of mass. Eliquis held and patient started on heparin drip. Denies fever, chills, chest pain, cough,  diarrhea, and urinary symptoms. There are no aggravating or alleviating factors. Symptoms are reported as acute, constant and moderate in severity    Past Medical History:     No past medical history on file. Past Surgical History:     No past surgical history on file. Medications Prior to Admission:     Prior to Admission medications    Medication Sig Start Date End Date Taking?  Authorizing Provider   ciprofloxacin (CIPRO) 500 MG tablet Take 1 tablet by mouth 2 times daily for 10 days 2/20/23 3/2/23  Rony Paula MD   metroNIDAZOLE (FLAGYL) 500 MG tablet Take 1 tablet by mouth 3 times daily for 10 days 2/20/23 3/2/23  Rony Paula MD   HYDROcodone-acetaminophen (NORCO) 5-325 MG per tablet Take 1 tablet by mouth 3 times daily as needed for Pain for up to 3 days. Intended supply: 3 days. Take lowest dose possible to manage pain Max Daily Amount: 3 tablets 2/20/23 2/23/23  Cristofer Medel MD   apixaban (ELIQUIS) 5 MG TABS tablet Take 2 tablets by mouth 2 times daily for 12 doses 2/16/23 2/22/23  Amelia Krueger MD   guaiFENesin (MUCINEX) 600 MG extended release tablet Take 600 mg by mouth 2 times daily as needed for Congestion    Historical Provider, MD   Multiple Vitamins-Minerals (THERAPEUTIC MULTIVITAMIN-MINERALS) tablet Take 1 tablet by mouth daily    Historical Provider, MD        Allergies:     Seasonal    Social History:     Tobacco:    reports that he has never smoked. He has never used smokeless tobacco.  Alcohol:      reports that he does not currently use alcohol. Drug Use:  reports that he does not currently use drugs after having used the following drugs: Marijuana Max Radon). Family History:     No family history on file.     Investigations:      Laboratory Testing:  Recent Results (from the past 24 hour(s))   CBC with Auto Differential    Collection Time: 02/20/23  7:30 PM   Result Value Ref Range    WBC 5.2 3.5 - 11.0 k/uL    RBC 4.86 4.5 - 5.9 m/uL    Hemoglobin 14.6 13.5 - 17.5 g/dL    Hematocrit 44.3 41 - 53 %    MCV 91.3 80 - 100 fL    MCH 30.1 26 - 34 pg    MCHC 32.9 31 - 37 g/dL    RDW 14.8 11.5 - 14.9 %    Platelets 170 591 - 196 k/uL    MPV 6.2 6.0 - 12.0 fL    Seg Neutrophils 42 36 - 66 %    Lymphocytes 44 24 - 44 %    Monocytes 12 (H) 1 - 7 %    Eosinophils % 1 0 - 4 %    Basophils 1 0 - 2 %    Segs Absolute 2.20 1.3 - 9.1 k/uL    Absolute Lymph # 2.30 1.0 - 4.8 k/uL    Absolute Mono # 0.60 0.1 - 1.3 k/uL    Absolute Eos # 0.00 0.0 - 0.4 k/uL    Basophils Absolute 0.00 0.0 - 0.2 k/uL   BMP    Collection Time: 02/20/23  7:30 PM   Result Value Ref Range    Glucose 100 (H) 70 - 99 mg/dL    BUN 11 6 - 20 mg/dL    Creatinine 0.68 (L) 0.70 - 1.20 mg/dL    Est, Glom Filt Rate >60 >60 mL/min/1.73m2    Calcium 9.0 8.6 - 10.4 mg/dL Sodium 131 (L) 135 - 144 mmol/L    Potassium 3.9 3.7 - 5.3 mmol/L    Chloride 96 (L) 98 - 107 mmol/L    CO2 23 20 - 31 mmol/L    Anion Gap 12 9 - 17 mmol/L   Protime-INR    Collection Time: 02/20/23  7:30 PM   Result Value Ref Range    Protime 14.0 11.8 - 14.6 sec    INR 1.1    APTT    Collection Time: 02/20/23  7:30 PM   Result Value Ref Range    PTT 33.0 24.0 - 36.0 sec   Hepatic Function Panel    Collection Time: 02/20/23  7:30 PM   Result Value Ref Range    Albumin 4.0 3.5 - 5.2 g/dL    Alkaline Phosphatase 164 (H) 40 - 129 U/L     (H) 5 - 41 U/L    AST 70 (H) <40 U/L    Total Bilirubin 1.0 0.3 - 1.2 mg/dL    Bilirubin, Direct 0.3 (H) <0.3 mg/dL    Bilirubin, Indirect 0.7 0.0 - 1.0 mg/dL    Total Protein 7.9 6.4 - 8.3 g/dL       Imaging/Diagnostics:  CT ABDOMEN PELVIS W IV CONTRAST Additional Contrast? Oral    Result Date: 2/20/2023  1. Suspicious mass likely in the left retroperitoneum measuring 7.2 x 7 cm in the axial dimension and 6.7 x 10.9 cm in the coronal dimension is mostly fatty with inhomogeneity with small areas of soft tissue density and mild calcification. There is concern for liposarcoma. 2. Mild fatty liver but no focal disease. 3. No acute gastrointestinal abnormality. RECOMMENDATIONS: CT-guided biopsy would be feasible. XR CHEST PORTABLE    Result Date: 2/14/2023  Mild right greater than left basilar pulmonary opacities on a background of low lung volumes likely representing subsegmental atelectasis. These opacities have increased on the right since yesterday. CT CHEST PULMONARY EMBOLISM W CONTRAST    Result Date: 2/14/2023  1. Bilateral acute pulmonary emboli most pronounced in the right lower lobe branches. Mild to moderate thrombus load. No right heart strain. 2. Subsegmental atelectasis at lung bases. Findings were discussed with Robin Vasquez at 9:46 a.m. on 2/14/2023.          Plan:     Patient status inpatient in the Med/Surge    Intra-abdominal mass  -Patient CT findings of left retroperitoneal mass measuring 7.2 x 7 cm concerning for liposarcoma  -Eliquis held and started on heparin drip for recent bilateral PE and DVT  -IR consult for biopsy  -Oncology consulted  -NPO at midnight    Full code    AUSTIN Kolb - NP  2/20/2023  9:04 PM      Please note that this chart was generated using voice recognition Dragon dictation software. Although every effort was made to ensure the accuracy of this automated transcription, some errors in transcription may have occurred. Ibeth Skelton [address] [place] [address].    Phone [phone]

## 2023-02-21 NOTE — H&P
GIANNI Ortiz 53    HISTORY AND PHYSICAL EXAMINATION            Date:   2/21/2023  Patient name:  Linnea Meyer  Date of admission:  2/20/2023  6:56 PM  MRN:   [id number]  Account:  [de-identified]  YOB: 1970  PCP:    Hamilton Covington MD  Room:   2061/2061-01  Code Status:    Full Code    Chief Complaint:     Chief Complaint   Patient presents with    Abnormal CT       History Obtained From:     patient, electronic medical record    History of Present Illness: The patient is a 46 y.o. Non- / non  male who presents with Abnormal CT   and he is admitted to the hospital for the management of abdominal pain, weight loss  Patient is a very healthy young man, does not take any medication on regular basis, he was recently admitted at [facility], diagnosed with right-sided DVT and bilateral PE, patient underwent echo, which was negative for strain pattern  Patient was discharged home on Eliquis  He presented to his PCP office, complaining of weight loss, abdominal pain, nausea. Symptoms are going on for last few weeks  Patient underwent CT abdomen pelvis suggestive of retroperitoneal mass, patient admitted to [facility] for plan IR guided biopsy, Eliquis changed to heparin  Oncology consulted  Patient also complaining of headache, dizziness          Past Medical History:     No past medical history on file. Past Surgical History:     No past surgical history on file. Medications Prior to Admission:     Prior to Admission medications    Medication Sig Start Date End Date Taking?  Authorizing Provider   ciprofloxacin (CIPRO) 500 MG tablet Take 1 tablet by mouth 2 times daily for 10 days 2/20/23 3/2/23  Colonel Kaylah MD   metroNIDAZOLE (FLAGYL) 500 MG tablet Take 1 tablet by mouth 3 times daily for 10 days 2/20/23 3/2/23  Colonel Kaylah MD   HYDROcodone-acetaminophen (NORCO) 5-325 MG per tablet Take 1 tablet by mouth 3 times daily as needed for Pain for up to 3 days. Intended supply: 3 days. Take lowest dose possible to manage pain Max Daily Amount: 3 tablets 2/20/23 2/23/23  Sophia Gutierres MD   apixaban (ELIQUIS) 5 MG TABS tablet Take 2 tablets by mouth 2 times daily for 12 doses 2/16/23 2/22/23  Tomas Mosquera MD   guaiFENesin (MUCINEX) 600 MG extended release tablet Take 600 mg by mouth 2 times daily as needed for Congestion    Historical Provider, MD   Multiple Vitamins-Minerals (THERAPEUTIC MULTIVITAMIN-MINERALS) tablet Take 1 tablet by mouth daily    Historical Provider, MD        Allergies:     Seasonal    Social History:     Tobacco:    reports that he has never smoked. He has never used smokeless tobacco.  Alcohol:      reports that he does not currently use alcohol. Drug Use:  reports that he does not currently use drugs after having used the following drugs: Marijuana [place]). Family History:     No family history on file. Review of Systems:     Positive and Negative as described in HPI. CONSTITUTIONAL: Positive for weight loss, around 18 pounds in 2 weeks  HEENT:  negative for vision, hearing changes, runny nose, throat pain  RESPIRATORY:  negative for shortness of breath, cough, congestion, wheezing. CARDIOVASCULAR:  negative for chest pain, palpitations.   GASTROINTESTINAL: Positive for nausea, abdominal pain  GENITOURINARY:  negative for difficulty of urination, burning with urination, frequency   INTEGUMENT:  negative for rash, skin lesions, easy bruising   HEMATOLOGIC/LYMPHATIC:  negative for swelling/edema   ALLERGIC/IMMUNOLOGIC:  negative for urticaria , itching  ENDOCRINE:  negative increase in drinking, increase in urination, hot or cold intolerance  MUSCULOSKELETAL:  negative joint pains, muscle aches, swelling of joints  NEUROLOGICAL: Positive dizziness, headache  BEHAVIOR/PSYCH:  negative for depression, anxiety    Physical Exam:   /87   Pulse 78   Temp 98.1 °F (36.7 °C) (Oral)   Resp 18   Ht 5' 10\" (1.778 m)   Wt 216 lb 4.3 oz (98.1 kg)   SpO2 94%   BMI 31.03 kg/m²   Temp (24hrs), Av.7 °F (36.5 °C), Min:97 °F (36.1 °C), Max:98.3 °F (36.8 °C)    No results for input(s): POCGLU in the last 72 hours. No intake or output data in the 24 hours ending 23 1123    General Appearance:  alert, well appearing, and in no acute distress, central obesity present  Mental status: oriented to person, place, and time with normal affect  Head:  normocephalic, atraumatic. Eye: no icterus, redness, pupils equal and reactive, extraocular eye movements intact, conjunctiva clear  Ear: normal external ear, no discharge, hearing intact  Nose:  no drainage noted  Mouth: mucous membranes moist  Neck: supple, no carotid bruits, thyroid not palpable  Lungs: Bilateral equal air entry, clear to ausculation, no wheezing, rales or rhonchi, normal effort  Cardiovascular: normal rate, regular rhythm, no murmur, gallop, rub.   Abdomen: Soft, nontender, nondistended, normal bowel sounds, no hepatomegaly or splenomegaly  Neurologic: There are no new focal motor or sensory deficits, normal muscle tone and bulk, no abnormal sensation, normal speech, cranial nerves II through XII grossly intact  Skin: No gross lesions, rashes, bruising or bleeding on exposed skin area  Extremities:  peripheral pulses palpable, no pedal edema or calf pain with palpation  Psych: normal affect     Investigations:      Laboratory Testing:  Recent Results (from the past 24 hour(s))   CBC with Auto Differential    Collection Time: 23  7:30 PM   Result Value Ref Range    WBC 5.2 3.5 - 11.0 k/uL    RBC 4.86 4.5 - 5.9 m/uL    Hemoglobin 14.6 13.5 - 17.5 g/dL    Hematocrit 44.3 41 - 53 %    MCV 91.3 80 - 100 fL    MCH 30.1 26 - 34 pg    MCHC 32.9 31 - 37 g/dL    RDW 14.8 11.5 - 14.9 %    Platelets 864 637 - 829 k/uL    MPV 6.2 6.0 - 12.0 fL    Seg Neutrophils 42 36 - 66 %    Lymphocytes 44 24 - 44 %    Monocytes 12 (H) 1 - 7 % Eosinophils % 1 0 - 4 %    Basophils 1 0 - 2 %    Segs Absolute 2.20 1.3 - 9.1 k/uL    Absolute Lymph # 2.30 1.0 - 4.8 k/uL    Absolute Mono # 0.60 0.1 - 1.3 k/uL    Absolute Eos # 0.00 0.0 - 0.4 k/uL    Basophils Absolute 0.00 0.0 - 0.2 k/uL   BMP    Collection Time: 02/20/23  7:30 PM   Result Value Ref Range    Glucose 100 (H) 70 - 99 mg/dL    BUN 11 6 - 20 mg/dL    Creatinine 0.68 (L) 0.70 - 1.20 mg/dL    Est, Glom Filt Rate >60 >60 mL/min/1.73m2    Calcium 9.0 8.6 - 10.4 mg/dL    Sodium 131 (L) 135 - 144 mmol/L    Potassium 3.9 3.7 - 5.3 mmol/L    Chloride 96 (L) 98 - 107 mmol/L    CO2 23 20 - 31 mmol/L    Anion Gap 12 9 - 17 mmol/L   Protime-INR    Collection Time: 02/20/23  7:30 PM   Result Value Ref Range    Protime 14.0 11.8 - 14.6 sec    INR 1.1    APTT    Collection Time: 02/20/23  7:30 PM   Result Value Ref Range    PTT 33.0 24.0 - 36.0 sec   Hepatic Function Panel    Collection Time: 02/20/23  7:30 PM   Result Value Ref Range    Albumin 4.0 3.5 - 5.2 g/dL    Alkaline Phosphatase 164 (H) 40 - 129 U/L     (H) 5 - 41 U/L    AST 70 (H) <40 U/L    Total Bilirubin 1.0 0.3 - 1.2 mg/dL    Bilirubin, Direct 0.3 (H) <0.3 mg/dL    Bilirubin, Indirect 0.7 0.0 - 1.0 mg/dL    Total Protein 7.9 6.4 - 8.3 g/dL   APTT    Collection Time: 02/21/23 12:56 AM   Result Value Ref Range    PTT >150.0 (HH) 24.0 - 36.0 sec   Basic Metabolic Panel w/ Reflex to MG    Collection Time: 02/21/23  8:58 AM   Result Value Ref Range    Glucose 110 (H) 70 - 99 mg/dL    BUN 9 6 - 20 mg/dL    Creatinine 0.57 (L) 0.70 - 1.20 mg/dL    Est, Glom Filt Rate >60 >60 mL/min/1.73m2    Calcium 8.7 8.6 - 10.4 mg/dL    Sodium 134 (L) 135 - 144 mmol/L    Potassium 4.2 3.7 - 5.3 mmol/L    Chloride 101 98 - 107 mmol/L    CO2 27 20 - 31 mmol/L    Anion Gap 6 (L) 9 - 17 mmol/L   CBC with Auto Differential    Collection Time: 02/21/23  8:58 AM   Result Value Ref Range    WBC 4.8 3.5 - 11.0 k/uL    RBC 4.66 4.5 - 5.9 m/uL    Hemoglobin 13.8 13.5 - 17.5 g/dL    Hematocrit 42.4 41 - 53 %    MCV 90.9 80 - 100 fL    MCH 29.6 26 - 34 pg    MCHC 32.6 31 - 37 g/dL    RDW 15.1 (H) 11.5 - 14.9 %    Platelets 807 960 - 102 k/uL    MPV 6.1 6.0 - 12.0 fL    Seg Neutrophils 37 36 - 66 %    Lymphocytes 48 (H) 24 - 44 %    Atypical Lymphocytes 1 %    Monocytes 14 (H) 1 - 7 %    Eosinophils % 0 0 - 4 %    Basophils 0 0 - 2 %    nRBC 1 (H) 0 per 100 WBC    Segs Absolute 1.76 1.3 - 9.1 k/uL    Absolute Lymph # 2.27 1.0 - 4.8 k/uL    Atypical Lymphocytes Absolute 0.05 k/uL    Absolute Mono # 0.67 0.1 - 1.3 k/uL    Absolute Eos # 0.00 0.0 - 0.4 k/uL    Basophils Absolute 0.00 0.0 - 0.2 k/uL    Morphology ANISOCYTOSIS PRESENT    APTT    Collection Time: 02/21/23  8:58 AM   Result Value Ref Range    .1 (HH) 24.0 - 36.0 sec       Imaging/Diagnostics:      Assessment :      Primary Problem  Intraabdominal mass    Active Hospital Problems    Diagnosis Date Noted    Intraabdominal mass [R19.00] 02/20/2023     Priority: Medium       Plan:     Patient status Admit as inpatient in the  Med/Surge    Admitted with retroperitoneal mass, plan for IR guided biopsy  Recent DVT and PE, on IV heparin  Oncology consulted  Patient complaining of headache, will also order CT head without contrast      Consultations:   IP CONSULT TO INTERNAL MEDICINE  IP CONSULT TO ONCOLOGY    Patient is admitted as inpatient status because of co-morbidities listed above, severity of signs and symptoms as outlined, requirement for current medical therapies and most importantly because of direct risk to patient if care not provided in a hospital setting. Jessica Burk MD  2/21/2023  11:23 AM    Copy sent to Dr. Nori Mccarthy MD    Please note that this chart was generated using voice recognition Dragon dictation software. Although every effort was made to ensure the accuracy of this automated transcription, some errors in transcription may have occurred.

## 2023-02-21 NOTE — CONSULTS
_                         Today's Date: 2/21/2023  Patient Name: Laurent Banuelos  Date of admission: 2/20/2023  6:56 PM  Patient's age: 46 y.o., 1970  Admission Dx: Retroperitoneal mass [R19.00]  Anticoagulated [Z79.01]  Intraabdominal mass [R19.00]      Requesting Physician: Raul Vera MD    CHIEF COMPLAINT: Abdominal pain. Intra-abdominal mass. Recent PE. History Obtained From:  patient, electronic medical record    HISTORY OF PRESENT ILLNESS:      The patient is a 46 y.o.  male who is admitted to the hospital for further management of abdominal pain and intra-abdominal mass. The patient was recently hospitalized due to bilateral pulmonary embolism. He was started on Eliquis. He did better with less shortness of breath. He presented to the ER with increasing abdominal pain and nausea. He had significant decrease in appetite. Patient states that for the last 2 to 3 weeks he lost about 10 pounds and he had decreased appetite. He also had night sweats. No documented fever. No nausea or vomiting. Past Medical History:   has no past medical history on file. Past Surgical History:   has no past surgical history on file. Family History: family history is not on file. Social History:   reports that he has never smoked. He has never used smokeless tobacco. He reports that he does not currently use alcohol. He reports that he does not currently use drugs after having used the following drugs: Marijuana Garrel Calender). Medications:    Prior to Admission medications    Medication Sig Start Date End Date Taking?  Authorizing Provider   ciprofloxacin (CIPRO) 500 MG tablet Take 1 tablet by mouth 2 times daily for 10 days 2/20/23 3/2/23  Lynn White MD   metroNIDAZOLE (FLAGYL) 500 MG tablet Take 1 tablet by mouth 3 times daily for 10 days 2/20/23 3/2/23  Lynn White MD   HYDROcodone-acetaminophen (NORCO) 5-325 MG per tablet Take 1 tablet by mouth 3 times daily as needed for Pain for up to 3 days. Intended supply: 3 days.  Take lowest dose possible to manage pain Max Daily Amount: 3 tablets 2/20/23 2/23/23  Rony Paula MD   apixaban (ELIQUIS) 5 MG TABS tablet Take 2 tablets by mouth 2 times daily for 12 doses 2/16/23 2/22/23  Lucien Garcia MD   guaiFENesin (MUCINEX) 600 MG extended release tablet Take 600 mg by mouth 2 times daily as needed for Congestion    Historical Provider, MD   Multiple Vitamins-Minerals (THERAPEUTIC MULTIVITAMIN-MINERALS) tablet Take 1 tablet by mouth daily    Historical Provider, MD     Current Facility-Administered Medications   Medication Dose Route Frequency Provider Last Rate Last Admin    heparin (porcine) injection 8,600 Units  80 Units/kg IntraVENous PRN Cortes Garrett, DO        heparin (porcine) injection 4,300 Units  40 Units/kg IntraVENous PRN [facility], DO        heparin 25,000 units in dextrose 5% 250 mL (premix) infusion  5-30 Units/kg/hr IntraVENous Continuous Cortes Garrett, DO 14 mL/hr at 02/21/23 1834 13 Units/kg/hr at 02/21/23 1834    HYDROcodone-acetaminophen (NORCO) 5-325 MG per tablet 1 tablet  1 tablet Oral TID PRN [place] Prude, APRN - NP        sodium chloride flush 0.9 % injection 5-40 mL  5-40 mL IntraVENous 2 times per day Daniel Prude, APRN - NP        sodium chloride flush 0.9 % injection 5-40 mL  5-40 mL IntraVENous PRN Ying Goldi, APRN - NP        0.9 % sodium chloride infusion   IntraVENous PRN [place] Prude, APRN - NP        ondansetron (ZOFRAN-ODT) disintegrating tablet 4 mg  4 mg Oral Q8H PRN Daniel Prude, APRN - NP        Or    ondansetron (ZOFRAN) injection 4 mg  4 mg IntraVENous Q6H PRN Ying Goldi, APRN - NP        polyethylene glycol (GLYCOLAX) packet 17 g  17 g Oral Daily PRN Daniel Prude, APRN - NP        acetaminophen (TYLENOL) tablet 650 mg  650 mg Oral Q6H PRN Daniel Prude, APRN - NP        Or    acetaminophen (TYLENOL) suppository 650 mg  650 mg Rectal Q6H PRN Marita Tang, APRN - NP        0.9 % sodium chloride infusion   IntraVENous Continuous Kirkland Clyde APRN - NP 75 mL/hr at 02/21/23 1122 New Bag at 02/21/23 1122     Facility-Administered Medications Ordered in Other Encounters   Medication Dose Route Frequency Provider Last Rate Last Admin    sodium chloride flush 0.9 % injection 10 mL  10 mL IntraVENous PRN Ward Gonzalez MD   10 mL at 02/20/23 1232       Allergies:  Seasonal    REVIEW OF SYSTEMS:      General: No weakness or fatigue. No unanticipated weight loss or decreased appetite. No fever or chills. Eyes: No blurred vision, eye pain or double vision. Ears: No hearing problems or drainage. No tinnitus. Throat: No sore throat, problems with swallowing or dysphagia. Respiratory: As above. Cardiovascular: No chest pain, orthopnea or PND. No lower extremity edema. No palpitation. Gastrointestinal: As above. Genitourinary: No dysuria, hematuria, frequency or urgency. Musculoskeletal: No muscle aches or pains. No limitation of movement. No back pain. No gait disturbance, No joint complaints. Dermatologic: No skin rashes or pruritus. No skin lesions or discolorations. Psychiatric: No depression, anxiety, or stress or signs of schizophrenia. No change in mood or affect. Hematologic: No history of bleeding tendency. No bruises or ecchymosis. No history of clotting problems. Infectious disease: No fever, chills or frequent infections. Endocrine: No polydipsia or polyuria. No temperature intolerance. Neurologic: No headaches or dizziness. No weakness or numbness of the extremities. No changes in balance, coordination,  memory, mentation, behavior. Allergic/Immunologic: No nasal congestion or hives. No repeated infections.        PHYSICAL EXAM:      BP (!) 144/78   Pulse 100   Temp 98 °F (36.7 °C) (Oral)   Resp 18   Ht 5' 10\" (1.778 m)   Wt 216 lb 4.3 oz (98.1 kg)   SpO2 94%   BMI 31.03 kg/m²    Temp (24hrs), Av.7 °F (36.5 °C), Min:97 °F (36.1 °C), Max:98.3 °F (36.8 °C)      General appearance - not in pain or distress  Mental status - alert and oriented  Eyes - pupils equal and reactive, limited movement of the right eye  Ears - bilateral TM's and external ear canals normal  Nose - normal and patent, no erythema, discharge or polyps  Mouth - mucous membranes moist, pharynx normal without lesions  Neck - supple, no significant adenopathy  Lymphatics - no palpable lymphadenopathy, no hepatosplenomegaly  Chest - clear to auscultation, no wheezes, rales or rhonchi, symmetric air entry  Heart - normal rate, regular rhythm, normal S1, S2, no murmurs, rubs, clicks or gallops  Abdomen - soft, mild abdominal tenderness.  , nondistended, no masses or organomegaly  Neurological - alert, oriented, normal speech, no focal findings or movement disorder noted  Musculoskeletal - no joint tenderness, deformity or swelling  Extremities - peripheral pulses normal, no pedal edema, no clubbing or cyanosis  Skin - normal coloration and turgor, no rashes, no suspicious skin lesions noted           DATA:      Labs:       CBC:   Recent Labs     23  0858   WBC 5.2 4.8   HGB 14.6 13.8   HCT 44.3 42.4    380     BMP:   Recent Labs     23  0858   * 134*   K 3.9 4.2   CO2 23 27   BUN 11 9   CREATININE 0.68* 0.57*   LABGLOM >60 >60   GLUCOSE 100* 110*     PT/INR:   Recent Labs     23   PROTIME 14.0   INR 1.1     APTT:  Recent Labs     23  0858 23  1735   APTT 108.1* 79.4*     LIVER PROFILE:  Recent Labs     23   AST 70*   *   LABALBU 4.0     CT HEAD WO CONTRAST  Narrative: EXAMINATION:  CT OF THE HEAD WITHOUT CONTRAST  2023 2:33 pm    TECHNIQUE:  CT of the head was performed without the administration of intravenous  contrast. Automated exposure control, iterative reconstruction, and/or weight  based adjustment of the mA/kV was utilized  to reduce the radiation dose to as  low as reasonably achievable. COMPARISON:  None. HISTORY:  ORDERING SYSTEM PROVIDED HISTORY: Headache  TECHNOLOGIST PROVIDED HISTORY:  Headache    Reason for Exam: Headache    FINDINGS:  BRAIN/VENTRICLES: There is no acute intracranial hemorrhage, mass effect or  midline shift. No abnormal extra-axial fluid collection. The gray-white  differentiation is maintained without evidence of an acute infarct. There is  no evidence of hydrocephalus. ORBITS: The visualized portion of the orbits demonstrate no acute abnormality. SINUSES: The visualized paranasal sinuses and mastoid air cells demonstrate  no acute abnormality. SOFT TISSUES/SKULL:  No acute abnormality of the visualized skull or soft  tissues. Impression: No acute intracranial abnormality. IMPRESSION:    Primary Problem  Retroperitoneal mass    Active Hospital Problems    Diagnosis Date Noted    Retroperitoneal mass [R19.00] 02/20/2023     Priority: Medium   Recent PE and DVT  Retroperitoneal mass, likely malignant  Probable hypercoagulable state secondary to probable underlying malignancy. RECOMMENDATIONS:  Records and labs and images were reviewed and discussed with the patient. Patient had recent DVT and PE and started on Eliquis. Currently he presents with abdominal symptoms and was found to have large retroperitoneal mass. Differential would be lymphoma or other solid malignancies. Biopsy is needed. We will have CT-guided needle biopsy by interventional radiology. Further recommendation will be based on the pathology results. I have explained to the patient different possibilities and possible management. Very likely patient's PE and DVT are related to hypercoagulable state secondary to underlying malignancy. Eliquis will be resumed after the biopsy. Patient's questions were answered to the best of his satisfaction and he verbalized full understanding and agreement.   Thank you for allowing us to participate in the care of this pleasant patient. Yuriy Antonio MD, MD                            [facility]                            This note is created with the assistance of a speech recognition program.  While intending to generate a document that actually reflects the content of the visit, the document can still have some errors including those of syntax and sound a like substitutions which may escape proof reading. It such instances, actual meaning can be extrapolated by contextual diversion.

## 2023-02-21 NOTE — PROGRESS NOTES
Spoke to Nicolas Glynn, RN to inform her that there is no Physician in IR today and that the abdominal mass biopsy would be done tomorrow at approximately 1000. Hold Heparin gtt. For 6 hours prior. Keli Youngblood will inform ordering physician. Writer placed a miscellaneous nurse order to hold heparin and placed a \"Held\" in the [facility]

## 2023-02-21 NOTE — PLAN OF CARE
Problem: Pain  Goal: Verbalizes/displays adequate comfort level or baseline comfort level  Outcome: Progressing  Note: Patient denies need for prn pain medication this shift.

## 2023-02-22 ENCOUNTER — APPOINTMENT (OUTPATIENT)
Dept: INTERVENTIONAL RADIOLOGY/VASCULAR | Age: 53
End: 2023-02-22
Payer: COMMERCIAL

## 2023-02-22 ENCOUNTER — APPOINTMENT (OUTPATIENT)
Dept: CT IMAGING | Age: 53
End: 2023-02-22
Payer: COMMERCIAL

## 2023-02-22 LAB
ABSOLUTE EOS #: 0 K/UL (ref 0–0.4)
ABSOLUTE LYMPH #: 2.4 K/UL (ref 1–4.8)
ABSOLUTE MONO #: 0.52 K/UL (ref 0.1–1.3)
ANION GAP SERPL CALCULATED.3IONS-SCNC: 7 MMOL/L (ref 9–17)
ANTI-XA UNFRAC HEPARIN: 0.39 IU/L (ref 0.3–0.7)
ANTI-XA UNFRAC HEPARIN: <0.1 IU/L (ref 0.3–0.7)
BASOPHILS # BLD: 0 % (ref 0–2)
BASOPHILS ABSOLUTE: 0 K/UL (ref 0–0.2)
BUN SERPL-MCNC: 8 MG/DL (ref 6–20)
CALCIUM SERPL-MCNC: 8.3 MG/DL (ref 8.6–10.4)
CHLORIDE SERPL-SCNC: 106 MMOL/L (ref 98–107)
CO2 SERPL-SCNC: 25 MMOL/L (ref 20–31)
CREAT SERPL-MCNC: 0.62 MG/DL (ref 0.7–1.2)
EOSINOPHILS RELATIVE PERCENT: 0 % (ref 0–4)
GFR SERPL CREATININE-BSD FRML MDRD: >60 ML/MIN/1.73M2
GLUCOSE SERPL-MCNC: 104 MG/DL (ref 70–99)
HCT VFR BLD AUTO: 39.6 % (ref 41–53)
HGB BLD-MCNC: 13.3 G/DL (ref 13.5–17.5)
INR PPP: 1
LYMPHOCYTES # BLD: 60 % (ref 24–44)
MCH RBC QN AUTO: 30.5 PG (ref 26–34)
MCHC RBC AUTO-ENTMCNC: 33.7 G/DL (ref 31–37)
MCV RBC AUTO: 90.5 FL (ref 80–100)
MONOCYTES # BLD: 13 % (ref 1–7)
MORPHOLOGY: NORMAL
PARTIAL THROMBOPLASTIN TIME: 27.9 SEC (ref 24–36)
PARTIAL THROMBOPLASTIN TIME: 57.4 SEC (ref 24–36)
PDW BLD-RTO: 14.8 % (ref 11.5–14.9)
PLATELET # BLD AUTO: 352 K/UL (ref 150–450)
PMV BLD AUTO: 6 FL (ref 6–12)
POTASSIUM SERPL-SCNC: 4.4 MMOL/L (ref 3.7–5.3)
PROTHROMBIN TIME: 13.5 SEC (ref 11.8–14.6)
RBC # BLD: 4.37 M/UL (ref 4.5–5.9)
SEG NEUTROPHILS: 27 % (ref 36–66)
SEGMENTED NEUTROPHILS ABSOLUTE COUNT: 1.08 K/UL (ref 1.3–9.1)
SODIUM SERPL-SCNC: 138 MMOL/L (ref 135–144)
WBC # BLD AUTO: 4 K/UL (ref 3.5–11)

## 2023-02-22 PROCEDURE — 85025 COMPLETE CBC W/AUTO DIFF WBC: CPT

## 2023-02-22 PROCEDURE — 85610 PROTHROMBIN TIME: CPT

## 2023-02-22 PROCEDURE — 85520 HEPARIN ASSAY: CPT

## 2023-02-22 PROCEDURE — 2580000003 HC RX 258: Performed by: NURSE PRACTITIONER

## 2023-02-22 PROCEDURE — 6360000002 HC RX W HCPCS: Performed by: RADIOLOGY

## 2023-02-22 PROCEDURE — 80048 BASIC METABOLIC PNL TOTAL CA: CPT

## 2023-02-22 PROCEDURE — 88307 TISSUE EXAM BY PATHOLOGIST: CPT

## 2023-02-22 PROCEDURE — 36415 COLL VENOUS BLD VENIPUNCTURE: CPT

## 2023-02-22 PROCEDURE — 99232 SBSQ HOSP IP/OBS MODERATE 35: CPT | Performed by: INTERNAL MEDICINE

## 2023-02-22 PROCEDURE — 88341 IMHCHEM/IMCYTCHM EA ADD ANTB: CPT

## 2023-02-22 PROCEDURE — 6370000000 HC RX 637 (ALT 250 FOR IP): Performed by: INTERNAL MEDICINE

## 2023-02-22 PROCEDURE — 0WBH3ZX EXCISION OF RETROPERITONEUM, PERCUTANEOUS APPROACH, DIAGNOSTIC: ICD-10-PCS | Performed by: STUDENT IN AN ORGANIZED HEALTH CARE EDUCATION/TRAINING PROGRAM

## 2023-02-22 PROCEDURE — 88342 IMHCHEM/IMCYTCHM 1ST ANTB: CPT

## 2023-02-22 PROCEDURE — 2709999900 CT BIOPSY ABDOMEN RETROPERITONEUM

## 2023-02-22 PROCEDURE — 1200000000 HC SEMI PRIVATE

## 2023-02-22 PROCEDURE — 85730 THROMBOPLASTIN TIME PARTIAL: CPT

## 2023-02-22 RX ORDER — MIDAZOLAM HYDROCHLORIDE 1 MG/ML
INJECTION INTRAMUSCULAR; INTRAVENOUS
Status: COMPLETED | OUTPATIENT
Start: 2023-02-22 | End: 2023-02-22

## 2023-02-22 RX ORDER — FENTANYL CITRATE 0.05 MG/ML
INJECTION, SOLUTION INTRAMUSCULAR; INTRAVENOUS
Status: COMPLETED | OUTPATIENT
Start: 2023-02-22 | End: 2023-02-22

## 2023-02-22 RX ADMIN — SODIUM CHLORIDE: 9 INJECTION, SOLUTION INTRAVENOUS at 15:41

## 2023-02-22 RX ADMIN — SODIUM CHLORIDE: 9 INJECTION, SOLUTION INTRAVENOUS at 01:32

## 2023-02-22 RX ADMIN — FENTANYL CITRATE 50 MCG: 0.05 INJECTION, SOLUTION INTRAMUSCULAR; INTRAVENOUS at 11:13

## 2023-02-22 RX ADMIN — APIXABAN 5 MG: 5 TABLET, FILM COATED ORAL at 20:10

## 2023-02-22 RX ADMIN — MIDAZOLAM 1 MG: 1 INJECTION INTRAMUSCULAR; INTRAVENOUS at 11:14

## 2023-02-22 NOTE — PROGRESS NOTES
_                         Today's Date: 2/22/2023  Patient Name: Radha Del Valle  Date of admission: 2/20/2023  6:56 PM  Patient's age: 46 y.o., 1970  Admission Dx: Retroperitoneal mass [R19.00]  Anticoagulated [Z79.01]  Intraabdominal mass [R19.00]      Requesting Physician: Britton Villafana MD    CHIEF COMPLAINT: Abdominal pain. Intra-abdominal mass. Recent PE. SUBJECTIVE:  . The patient was seen and examined. Clinically stable. He had CT-guided biopsy today. Tolerated well. He has pain in that area. No other complaints. BRIEF CASE HISTORY:    The patient is a 46 y.o.  male who is admitted to the hospital for further management of abdominal pain and intra-abdominal mass. The patient was recently hospitalized due to bilateral pulmonary embolism. He was started on Eliquis. He did better with less shortness of breath. He presented to the ER with increasing abdominal pain and nausea. He had significant decrease in appetite. Patient states that for the last 2 to 3 weeks he lost about 10 pounds and he had decreased appetite. He also had night sweats. No documented fever. No nausea or vomiting. Past Medical History:   has no past medical history on file. Past Surgical History:   has no past surgical history on file. Family History: family history is not on file. Social History:   reports that he has never smoked. He has never used smokeless tobacco. He reports that he does not currently use alcohol. He reports that he does not currently use drugs after having used the following drugs: Marijuana Max Radon). Medications:    Prior to Admission medications    Medication Sig Start Date End Date Taking?  Authorizing Provider   ciprofloxacin (CIPRO) 500 MG tablet Take 1 tablet by mouth 2 times daily for 10 days 2/20/23 3/2/23  Cristofer Medel MD   metroNIDAZOLE (FLAGYL) 500 MG tablet Take 1 tablet by mouth 3 times daily for 10 days 2/20/23 3/2/23  Kael Walker MD   HYDROcodone-acetaminophen (NORCO) 5-325 MG per tablet Take 1 tablet by mouth 3 times daily as needed for Pain for up to 3 days. Intended supply: 3 days.  Take lowest dose possible to manage pain Max Daily Amount: 3 tablets 2/20/23 2/23/23  Kael Walker MD   apixaban (ELIQUIS) 5 MG TABS tablet Take 2 tablets by mouth 2 times daily for 12 doses 2/16/23 2/22/23  Karen Balderrama MD   guaiFENesin (MUCINEX) 600 MG extended release tablet Take 600 mg by mouth 2 times daily as needed for Congestion    Historical Provider, MD   Multiple Vitamins-Minerals (THERAPEUTIC MULTIVITAMIN-MINERALS) tablet Take 1 tablet by mouth daily    Historical Provider, MD     Current Facility-Administered Medications   Medication Dose Route Frequency Provider Last Rate Last Admin    heparin (porcine) injection 8,600 Units  80 Units/kg IntraVENous PRN Arnulfo Zeferino, DO        heparin (porcine) injection 4,300 Units  40 Units/kg IntraVENous PRN Arnulfo Zeferino, DO        heparin 25,000 units in dextrose 5% 250 mL (premix) infusion  5-30 Units/kg/hr IntraVENous Continuous [place] Zeferino, DO   Stopped at 02/22/23 0415    HYDROcodone-acetaminophen (NORCO) 5-325 MG per tablet 1 tablet  1 tablet Oral TID PRN Renuka Jazmyn APRN - NP        sodium chloride flush 0.9 % injection 5-40 mL  5-40 mL IntraVENous 2 times per day Renuka Jazmyn, APRN - NP   10 mL at 02/21/23 2300    sodium chloride flush 0.9 % injection 5-40 mL  5-40 mL IntraVENous PRN Ying Valdez APRN - NP        0.9 % sodium chloride infusion   IntraVENous PRN Renuka Jazmyn APRN - NP        ondansetron (ZOFRAN-ODT) disintegrating tablet 4 mg  4 mg Oral Q8H PRN Renukaadrienne Eldridge APRN - EVAN        Or    ondansetron (ZOFRAN) injection 4 mg  4 mg IntraVENous Q6H PRN Ying Valdez, APRN - NP        polyethylene glycol (GLYCOLAX) packet 17 g  17 g Oral Daily PRN Renuka Jazmyn APRN - NP        acetaminophen (TYLENOL) tablet 650 mg  650 mg Oral Q6H PRN Renuka Guile, APRN - NP        Or    acetaminophen (TYLENOL) suppository 650 mg  650 mg Rectal Q6H PRN Renuka Guile, APRN - NP        0.9 % sodium chloride infusion   IntraVENous Continuous Renuka Guile, APRN - NP 75 mL/hr at 02/22/23 0447 Rate Verify at 02/22/23 0447     Facility-Administered Medications Ordered in Other Encounters   Medication Dose Route Frequency Provider Last Rate Last Admin    sodium chloride flush 0.9 % injection 10 mL  10 mL IntraVENous PRN Kael Walker MD   10 mL at 02/20/23 1232       Allergies:  Seasonal    REVIEW OF SYSTEMS:      General: No weakness or fatigue. No unanticipated weight loss or decreased appetite. No fever or chills. Eyes: No blurred vision, eye pain or double vision. Ears: No hearing problems or drainage. No tinnitus. Throat: No sore throat, problems with swallowing or dysphagia. Respiratory: As above. Cardiovascular: No chest pain, orthopnea or PND. No lower extremity edema. No palpitation. Gastrointestinal: As above. Genitourinary: No dysuria, hematuria, frequency or urgency. Musculoskeletal: No muscle aches or pains. No limitation of movement. No back pain. No gait disturbance, No joint complaints. Dermatologic: No skin rashes or pruritus. No skin lesions or discolorations. Psychiatric: No depression, anxiety, or stress or signs of schizophrenia. No change in mood or affect. Hematologic: No history of bleeding tendency. No bruises or ecchymosis. No history of clotting problems. Infectious disease: No fever, chills or frequent infections. Endocrine: No polydipsia or polyuria. No temperature intolerance. Neurologic: No headaches or dizziness. No weakness or numbness of the extremities. No changes in balance, coordination,  memory, mentation, behavior. Allergic/Immunologic: No nasal congestion or hives. No repeated infections.        PHYSICAL EXAM:      /67   Pulse 85   Temp 98.1 °F (36.7 °C) (Oral)   Resp 23 Ht 5' 10\" (1.778 m)   Wt 218 lb 7.6 oz (99.1 kg)   SpO2 96%   BMI 31.35 kg/m²    Temp (24hrs), Av.9 °F (36.6 °C), Min:97.6 °F (36.4 °C), Max:98.1 °F (36.7 °C)      General appearance - not in pain or distress  Mental status - alert and oriented  Eyes - pupils equal and reactive, limited movement of the right eye  Ears - bilateral TM's and external ear canals normal  Nose - normal and patent, no erythema, discharge or polyps  Mouth - mucous membranes moist, pharynx normal without lesions  Neck - supple, no significant adenopathy  Lymphatics - no palpable lymphadenopathy, no hepatosplenomegaly  Chest - clear to auscultation, no wheezes, rales or rhonchi, symmetric air entry  Heart - normal rate, regular rhythm, normal S1, S2, no murmurs, rubs, clicks or gallops  Abdomen - soft, mild abdominal tenderness.   , nondistended, no masses or organomegaly  Neurological - alert, oriented, normal speech, no focal findings or movement disorder noted  Musculoskeletal - no joint tenderness, deformity or swelling  Extremities - peripheral pulses normal, no pedal edema, no clubbing or cyanosis  Skin - normal coloration and turgor, no rashes, no suspicious skin lesions noted           DATA:      Labs:       CBC:   Recent Labs     23  0858 23   WBC 4.8 4.0   HGB 13.8 13.3*   HCT 42.4 39.6*    352     BMP:   Recent Labs     23  0858 23   * 138   K 4.2 4.4   CO2 27 25   BUN 9 8   CREATININE 0.57* 0.62*   LABGLOM >60 >60   GLUCOSE 110* 104*     PT/INR:   Recent Labs     230 23  0448   PROTIME 14.0 13.5   INR 1.1 1.0     APTT:  Recent Labs     238 23  1227   APTT 57.4* 27.9     LIVER PROFILE:  Recent Labs     23   AST 70*   *   LABALBU 4.0     CT HEAD WO CONTRAST  Narrative: EXAMINATION:  CT OF THE HEAD WITHOUT CONTRAST  2023 2:33 pm    TECHNIQUE:  CT of the head was performed without the administration of intravenous  contrast. Automated exposure control, iterative reconstruction, and/or weight  based adjustment of the mA/kV was utilized to reduce the radiation dose to as  low as reasonably achievable. COMPARISON:  None. HISTORY:  ORDERING SYSTEM PROVIDED HISTORY: Headache  TECHNOLOGIST PROVIDED HISTORY:  Headache    Reason for Exam: Headache    FINDINGS:  BRAIN/VENTRICLES: There is no acute intracranial hemorrhage, mass effect or  midline shift. No abnormal extra-axial fluid collection. The gray-white  differentiation is maintained without evidence of an acute infarct. There is  no evidence of hydrocephalus. ORBITS: The visualized portion of the orbits demonstrate no acute abnormality. SINUSES: The visualized paranasal sinuses and mastoid air cells demonstrate  no acute abnormality. SOFT TISSUES/SKULL:  No acute abnormality of the visualized skull or soft  tissues. Impression: No acute intracranial abnormality. IMPRESSION:    Primary Problem  Retroperitoneal mass    Active Hospital Problems    Diagnosis Date Noted    Retroperitoneal mass [R19.00] 02/20/2023     Priority: Medium   Recent PE and DVT  Retroperitoneal mass, likely malignant  Probable hypercoagulable state secondary to probable underlying malignancy. RECOMMENDATIONS:  Records and labs and images were reviewed and discussed with the patient. Patient had recent DVT and PE and started on Eliquis. Currently he presents with abdominal symptoms and was found to have large retroperitoneal mass. Differential would be lymphoma or other solid malignancies. He had CT-guided needle biopsy today. .  Further recommendation will be based on the pathology results. I have explained to the patient different possibilities and possible management. Very likely patient's PE and DVT are related to hypercoagulable state secondary to underlying malignancy. Anticoagulation will be resumed in few hours.     Patient's questions were answered to the best of his satisfaction and he verbalized full understanding and agreement. Samira Fry MD, MD                            [facility]                            This note is created with the assistance of a speech recognition program.  While intending to generate a document that actually reflects the content of the visit, the document can still have some errors including those of syntax and sound a like substitutions which may escape proof reading. It such instances, actual meaning can be extrapolated by contextual diversion.

## 2023-02-22 NOTE — PROGRESS NOTES
Comprehensive Nutrition Assessment    Type and Reason for Visit:  Initial, Positive Nutrition Screen (wt loss, poor appetite)    Nutrition Recommendations/Plan:   Will provide Regular diet with Ensure High Protein twice daily     Malnutrition Assessment:  Malnutrition Status: At risk for malnutrition (Comment) (02/22/23 7814)    Context:  Acute Illness     Findings of the 6 clinical characteristics of malnutrition:  Energy Intake:  50% or less of estimated energy requirements for 5 or more days  Weight Loss:  Unable to assess     Body Fat Loss:  No significant body fat loss     Muscle Mass Loss:  No significant muscle mass loss    Fluid Accumulation:  Unable to assess     Strength:  Not Performed    Nutrition Assessment:    Pt was admitted due to retroperitoneal mass. Pt states he has had minimal appetite for the past month. Based on stated wt history, pt has lost around 15# of unknown duration. Nutrition Related Findings:    Edema: mild BLE, Labs/Meds: Reviewed, No PMH Wound Type: None       Current Nutrition Intake & Therapies:    Average Meal Intake: %     ADULT DIET; Regular    Anthropometric Measures:  Height: 5' 10\" (177.8 cm)  Ideal Body Weight (IBW): 166 lbs (75 kg)    Admission Body Weight: 218 lb (98.9 kg)  Current Body Weight: 218 lb (98.9 kg),   IBW. Weight Source: Bed Scale  Current BMI (kg/m2): 31.3  Usual Body Weight:  (233-237#)                       BMI Categories: Obese Class 1 (BMI 30.0-34. 9)    Estimated Daily Nutrient Needs:  Energy Requirements Based On: Formula     Energy (kcal/day): [place] x 1.2= 2200 kcal  Weight Used for Protein Requirements: Admission  Protein (g/day): 1.5g/kg= 145-150 g       Nutrition Diagnosis:   Predicted inadequate energy intake related to  (poor appetite) as evidenced by poor intake prior to admission, weight loss    Nutrition Interventions:   Food and/or Nutrient Delivery: Start Oral Nutrition Supplement  Nutrition Education/Counseling: No recommendation at this time  Coordination of Nutrition Care: Continue to monitor while inpatient       Goals:     Goals: PO intake 75% or greater       Nutrition Monitoring and Evaluation:      Food/Nutrient Intake Outcomes: Food and Nutrient Intake, Supplement Intake  Physical Signs/Symptoms Outcomes: Biochemical Data, GI Status, Fluid Status or Edema, Skin, Weight    Discharge Planning:    Continue current diet     Alicja Sims RD, LD  Contact: [phone]

## 2023-02-22 NOTE — PROGRESS NOTES
GIANNI Ortiz 53    HISTORY AND PHYSICAL EXAMINATION            Date:   2/22/2023  Patient name:  Brianna Antunez  Date of admission:  2/20/2023  6:56 PM  MRN:   [id number]  Account:  [de-identified]  YOB: 1970  PCP:    Lucien Garcia MD  Room:   2061/2061-01  Code Status:    Full Code    Chief Complaint:     Chief Complaint   Patient presents with    Abnormal CT       History Obtained From:     patient, electronic medical record    History of Present Illness: The patient is a 46 y.o. Non- / non  male who presents with Abnormal CT   and he is admitted to the hospital for the management of abdominal pain, weight loss  Patient is a very healthy young man, does not take any medication on regular basis, he was recently admitted at [facility], diagnosed with right-sided DVT and bilateral PE, patient underwent echo, which was negative for strain pattern  Patient was discharged home on Eliquis  He presented to his PCP office, complaining of weight loss, abdominal pain, nausea. Symptoms are going on for last few weeks  Patient underwent CT abdomen pelvis suggestive of retroperitoneal mass, patient admitted to [facility] for plan IR guided biopsy, Eliquis changed to heparin  Oncology consulted  Patient also complaining of headache, dizziness  2/22  Patient, clinically doing better  S/p IR guided biopsy of retroperitoneal mass  Still on heparin drip  Hemoglobin stable          Past Medical History:     No past medical history on file. Past Surgical History:     No past surgical history on file. Medications Prior to Admission:     Prior to Admission medications    Medication Sig Start Date End Date Taking?  Authorizing Provider   ciprofloxacin (CIPRO) 500 MG tablet Take 1 tablet by mouth 2 times daily for 10 days 2/20/23 3/2/23  Rony Paula MD   metroNIDAZOLE (FLAGYL) 500 MG tablet Take 1 tablet by mouth 3 times daily for 10 days 2/20/23 3/2/23  Derrick Carballo MD   HYDROcodone-acetaminophen (NORCO) 5-325 MG per tablet Take 1 tablet by mouth 3 times daily as needed for Pain for up to 3 days. Intended supply: 3 days. Take lowest dose possible to manage pain Max Daily Amount: 3 tablets 2/20/23 2/23/23  Derrick Carballo MD   apixaban (ELIQUIS) 5 MG TABS tablet Take 2 tablets by mouth 2 times daily for 12 doses 2/16/23 2/22/23  Tavo Garcia MD   guaiFENesin (MUCINEX) 600 MG extended release tablet Take 600 mg by mouth 2 times daily as needed for Congestion    Historical Provider, MD   Multiple Vitamins-Minerals (THERAPEUTIC MULTIVITAMIN-MINERALS) tablet Take 1 tablet by mouth daily    Historical Provider, MD        Allergies:     Seasonal    Social History:     Tobacco:    reports that he has never smoked. He has never used smokeless tobacco.  Alcohol:      reports that he does not currently use alcohol. Drug Use:  reports that he does not currently use drugs after having used the following drugs: Marijuana Alfornelizabeth Gilman). Family History:     No family history on file. Review of Systems:     Positive and Negative as described in HPI. CONSTITUTIONAL: Positive for weight loss, around 18 pounds in 2 weeks  HEENT:  negative for vision, hearing changes, runny nose, throat pain  RESPIRATORY:  negative for shortness of breath, cough, congestion, wheezing. CARDIOVASCULAR:  negative for chest pain, palpitations.   GASTROINTESTINAL: Positive for nausea, abdominal pain  GENITOURINARY:  negative for difficulty of urination, burning with urination, frequency   INTEGUMENT:  negative for rash, skin lesions, easy bruising   HEMATOLOGIC/LYMPHATIC:  negative for swelling/edema   ALLERGIC/IMMUNOLOGIC:  negative for urticaria , itching  ENDOCRINE:  negative increase in drinking, increase in urination, hot or cold intolerance  MUSCULOSKELETAL:  negative joint pains, muscle aches, swelling of joints  NEUROLOGICAL: Positive dizziness, headache  BEHAVIOR/PSYCH:  negative for depression, anxiety    Physical Exam:   /67   Pulse 85   Temp 98.1 °F (36.7 °C) (Oral)   Resp 23   Ht 5' 10\" (1.778 m)   Wt 218 lb 7.6 oz (99.1 kg)   SpO2 96%   BMI 31.35 kg/m²   Temp (24hrs), Av.9 °F (36.6 °C), Min:97.6 °F (36.4 °C), Max:98.1 °F (36.7 °C)    No results for input(s): POCGLU in the last 72 hours. Intake/Output Summary (Last 24 hours) at 2023 1435  Last data filed at 2023 0447  Gross per 24 hour   Intake 2773.12 ml   Output --   Net 2773.12 ml       General Appearance:  alert, well appearing, and in no acute distress, central obesity present  Mental status: oriented to person, place, and time with normal affect  Head:  normocephalic, atraumatic. Eye: no icterus, redness, pupils equal and reactive, extraocular eye movements intact, conjunctiva clear  Ear: normal external ear, no discharge, hearing intact  Nose:  no drainage noted  Mouth: mucous membranes moist  Neck: supple, no carotid bruits, thyroid not palpable  Lungs: Bilateral equal air entry, clear to ausculation, no wheezing, rales or rhonchi, normal effort  Cardiovascular: normal rate, regular rhythm, no murmur, gallop, rub.   Abdomen: Soft, nontender, nondistended, normal bowel sounds, no hepatomegaly or splenomegaly  Neurologic: There are no new focal motor or sensory deficits, normal muscle tone and bulk, no abnormal sensation, normal speech, cranial nerves II through XII grossly intact  Skin: No gross lesions, rashes, bruising or bleeding on exposed skin area  Extremities:  peripheral pulses palpable, no pedal edema or calf pain with palpation  Psych: normal affect     Investigations:      Laboratory Testing:  Recent Results (from the past 24 hour(s))   APTT    Collection Time: 23  5:35 PM   Result Value Ref Range    PTT 79.4 (H) 24.0 - 36.0 sec   APTT    Collection Time: 23 10:22 PM   Result Value Ref Range    PTT 68.8 (H) 24.0 - 36.0 sec   APTT    Collection Time: 02/22/23  4:48 AM   Result Value Ref Range    PTT 57.4 (H) 24.0 - 36.0 sec   Basic Metabolic Panel w/ Reflex to MG    Collection Time: 02/22/23  4:48 AM   Result Value Ref Range    Glucose 104 (H) 70 - 99 mg/dL    BUN 8 6 - 20 mg/dL    Creatinine 0.62 (L) 0.70 - 1.20 mg/dL    Est, Glom Filt Rate >60 >60 mL/min/1.73m2    Calcium 8.3 (L) 8.6 - 10.4 mg/dL    Sodium 138 135 - 144 mmol/L    Potassium 4.4 3.7 - 5.3 mmol/L    Chloride 106 98 - 107 mmol/L    CO2 25 20 - 31 mmol/L    Anion Gap 7 (L) 9 - 17 mmol/L   CBC with Auto Differential    Collection Time: 02/22/23  4:48 AM   Result Value Ref Range    WBC 4.0 3.5 - 11.0 k/uL    RBC 4.37 (L) 4.5 - 5.9 m/uL    Hemoglobin 13.3 (L) 13.5 - 17.5 g/dL    Hematocrit 39.6 (L) 41 - 53 %    MCV 90.5 80 - 100 fL    MCH 30.5 26 - 34 pg    MCHC 33.7 31 - 37 g/dL    RDW 14.8 11.5 - 14.9 %    Platelets 694 056 - 421 k/uL    MPV 6.0 6.0 - 12.0 fL    Seg Neutrophils 27 (L) 36 - 66 %    Lymphocytes 60 (H) 24 - 44 %    Monocytes 13 (H) 1 - 7 %    Eosinophils % 0 0 - 4 %    Basophils 0 0 - 2 %    Segs Absolute 1.08 (L) 1.3 - 9.1 k/uL    Absolute Lymph # 2.40 1.0 - 4.8 k/uL    Absolute Mono # 0.52 0.1 - 1.3 k/uL    Absolute Eos # 0.00 0.0 - 0.4 k/uL    Basophils Absolute 0.00 0.0 - 0.2 k/uL    Morphology Normal    Anti-Xa, Unfractionated Heparin    Collection Time: 02/22/23  4:48 AM   Result Value Ref Range    Anti-XA Unfrac Heparin 0.39 0.30 - 0.70 IU/L   Protime-INR    Collection Time: 02/22/23  4:48 AM   Result Value Ref Range    Protime 13.5 11.8 - 14.6 sec    INR 1.0    APTT    Collection Time: 02/22/23 12:27 PM   Result Value Ref Range    PTT 27.9 24.0 - 36.0 sec   Anti-Xa, Unfractionated Heparin    Collection Time: 02/22/23 12:27 PM   Result Value Ref Range    Anti-XA Unfrac Heparin <0.10 (L) 0.30 - 0.70 IU/L       Imaging/Diagnostics:      Assessment :      Primary Problem  Retroperitoneal mass    Active Hospital Problems    Diagnosis Date Noted    Retroperitoneal mass [R19.00] 02/20/2023     Priority: Medium       Plan:     Patient status Admit as inpatient in the  Med/Surge    Admitted with retroperitoneal mass, plan for IR guided biopsy  Recent DVT and PE, on IV heparin  Oncology consulted  Patient complaining of headache, will also order CT head without contrast    2/22  Patient, s/p IR guided biopsy  Biopsy report awaited  DC plan once, logistics are arranged   Consultations:   IP CONSULT TO INTERNAL MEDICINE  IP CONSULT TO ONCOLOGY    Patient is admitted as inpatient status because of co-morbidities listed above, severity of signs and symptoms as outlined, requirement for current medical therapies and most importantly because of direct risk to patient if care not provided in a hospital setting. Rayshawn Duque MD  2/22/2023  2:35 PM    Copy sent to Dr. Ruba Abebe MD    Please note that this chart was generated using voice recognition Dragon dictation software. Although every effort was made to ensure the accuracy of this automated transcription, some errors in transcription may have occurred.

## 2023-02-22 NOTE — PLAN OF CARE
Problem: Discharge Planning  Goal: Discharge to home or other facility with appropriate resources  Outcome: Progressing     Problem: ABCDS Injury Assessment  Goal: Absence of physical injury  Outcome: Progressing     Problem: Pain  Goal: Verbalizes/displays adequate comfort level or baseline comfort level  2/22/2023 0338 by Veronica Garcia RN  Outcome: Progressing  2/21/2023 1832 by Michael Warner RN  Outcome: Progressing  Note: Patient denies need for prn pain medication this shift. C/o some pain this shift but denies need for medication intervention.

## 2023-02-22 NOTE — PROGRESS NOTES
SC visit with patient after medical procedure; patient talked about his recent biopsy and the logistics of being discharged; patient is very independent and does not want to rely on help from his family to get home; patient talked about family dynamics between himself and his step father; listening presence and support; patient also talked about his spiritual beliefs; got medical update from [place], [facility]     02/22/23 1612   Encounter Summary   Encounter Overview/Reason  Spiritual/Emotional Needs   Service Provided For: Patient   Referral/Consult From: [facility]   Last Encounter  02/22/23   Complexity of Encounter Moderate   Encounter    Type Post-Procedural   Grief, Loss, and Adjustments   Type Adjustment to illness   Assessment/Intervention/Outcome   Assessment Anxious; Coping; Hopeful;Impaired resilience; Powerlessness   Intervention Active listening;Discussed belief system/Judaism practices/kellie;Discussed illness injury and its impact; Explored/Affirmed feelings, thoughts, concerns;Sustaining Presence/Ministry of presence   Outcome Coping;Engaged in conversation;Expressed feelings, needs, and concerns;Expressed Gratitude;Receptive

## 2023-02-22 NOTE — BRIEF OP NOTE
Brief Postoperative Note    Peewee Rucker  YOB: 1970  [id number]    Pre-operative Diagnosis: Left retroperitoneal fatty mass    Post-operative Diagnosis: Same    Procedure: CT guided core biopsy of the most concerning area/solid/soft tissue component     Anesthesia: Moderate Sedation    Surgeons/Assistants: Parth    Estimated Blood Loss: less than 50     Complications: None    Specimens: Was Obtained: 18 G core 3 samples    Findings: No immediate post op complication    Electronically signed by Juan Jose Landis MD on 2/22/2023 at 2:56 PM

## 2023-02-22 NOTE — CARE COORDINATION
ONGOING DISCHARGE PLAN:    Patient is alert and oriented x4. Spoke with patient regarding discharge plan and patient confirms that plan is still to discharge to home with no needs    Patient had a retroperitoneal mass biopsy     Oncology consulted    CT head without contrast    Will continue to follow for additional discharge needs.     Electronically signed by Bear Velázquez RN on 2/22/2023 at 3:56 PM

## 2023-02-23 VITALS
BODY MASS INDEX: 31.28 KG/M2 | DIASTOLIC BLOOD PRESSURE: 92 MMHG | RESPIRATION RATE: 16 BRPM | HEIGHT: 70 IN | WEIGHT: 218.48 LBS | SYSTOLIC BLOOD PRESSURE: 139 MMHG | HEART RATE: 75 BPM | OXYGEN SATURATION: 96 % | TEMPERATURE: 98.1 F

## 2023-02-23 LAB
ABSOLUTE EOS #: 0.09 K/UL (ref 0–0.4)
ABSOLUTE LYMPH #: 2.4 K/UL (ref 1–4.8)
ABSOLUTE MONO #: 0.75 K/UL (ref 0.1–1.3)
ANION GAP SERPL CALCULATED.3IONS-SCNC: 8 MMOL/L (ref 9–17)
BASOPHILS # BLD: 0 % (ref 0–2)
BASOPHILS ABSOLUTE: 0 K/UL (ref 0–0.2)
BUN SERPL-MCNC: 8 MG/DL (ref 6–20)
CALCIUM SERPL-MCNC: 8.6 MG/DL (ref 8.6–10.4)
CHLORIDE SERPL-SCNC: 100 MMOL/L (ref 98–107)
CO2 SERPL-SCNC: 25 MMOL/L (ref 20–31)
CREAT SERPL-MCNC: 0.62 MG/DL (ref 0.7–1.2)
EOSINOPHILS RELATIVE PERCENT: 2 % (ref 0–4)
GFR SERPL CREATININE-BSD FRML MDRD: >60 ML/MIN/1.73M2
GLUCOSE SERPL-MCNC: 112 MG/DL (ref 70–99)
HCT VFR BLD AUTO: 41.1 % (ref 41–53)
HGB BLD-MCNC: 13.8 G/DL (ref 13.5–17.5)
LYMPHOCYTES # BLD: 51 % (ref 24–44)
MCH RBC QN AUTO: 30.4 PG (ref 26–34)
MCHC RBC AUTO-ENTMCNC: 33.5 G/DL (ref 31–37)
MCV RBC AUTO: 90.8 FL (ref 80–100)
MONOCYTES # BLD: 16 % (ref 1–7)
MORPHOLOGY: ABNORMAL
PDW BLD-RTO: 15.1 % (ref 11.5–14.9)
PLATELET # BLD AUTO: 354 K/UL (ref 150–450)
PMV BLD AUTO: 6.1 FL (ref 6–12)
POTASSIUM SERPL-SCNC: 4 MMOL/L (ref 3.7–5.3)
RBC # BLD: 4.53 M/UL (ref 4.5–5.9)
SEG NEUTROPHILS: 31 % (ref 36–66)
SEGMENTED NEUTROPHILS ABSOLUTE COUNT: 1.46 K/UL (ref 1.3–9.1)
SODIUM SERPL-SCNC: 133 MMOL/L (ref 135–144)
WBC # BLD AUTO: 4.7 K/UL (ref 3.5–11)

## 2023-02-23 PROCEDURE — 36415 COLL VENOUS BLD VENIPUNCTURE: CPT

## 2023-02-23 PROCEDURE — 2580000003 HC RX 258: Performed by: NURSE PRACTITIONER

## 2023-02-23 PROCEDURE — 99232 SBSQ HOSP IP/OBS MODERATE 35: CPT | Performed by: INTERNAL MEDICINE

## 2023-02-23 PROCEDURE — 85025 COMPLETE CBC W/AUTO DIFF WBC: CPT

## 2023-02-23 PROCEDURE — 80048 BASIC METABOLIC PNL TOTAL CA: CPT

## 2023-02-23 RX ADMIN — SODIUM CHLORIDE: 9 INJECTION, SOLUTION INTRAVENOUS at 05:08

## 2023-02-23 NOTE — PLAN OF CARE
Problem: Discharge Planning  Goal: Discharge to home or other facility with appropriate resources  2/23/2023 1346 by Dina Mejía RN  Outcome: Completed  2/23/2023 1101 by Volodymyr Mendez RN  Outcome: Progressing     Problem: ABCDS Injury Assessment  Goal: Absence of physical injury  2/23/2023 1346 by Dina Mejía RN  Outcome: Completed  2/23/2023 1101 by Volodymyr Mendez RN  Outcome: Progressing  Flowsheets (Taken 2/22/2023 2234 by Tim Guardado RN)  Absence of Physical Injury: Implement safety measures based on patient assessment     Problem: Pain  Goal: Verbalizes/displays adequate comfort level or baseline comfort level  2/23/2023 1346 by Dina Mejía RN  Outcome: Completed  2/23/2023 1101 by Volodymyr Mendez RN  Outcome: Progressing  Note: Assess the location, characteristics, onset, duration, frequency, quality, and severity of pain. Encourage immediate report of pain. Use appropriate pain scale to rate pain. Manage pain using nonpharmacologic/pharmacologic interventions.       Problem: Skin/Tissue Integrity - Adult  Goal: Incisions, wounds, or drain sites healing without S/S of infection  2/23/2023 1346 by Dina Mejía RN  Outcome: Completed  2/23/2023 1101 by Volodymyr Mendez RN  Outcome: Progressing     Problem: Gastrointestinal - Adult  Goal: Minimal or absence of nausea and vomiting  2/23/2023 1346 by Dina Mejía RN  Outcome: Completed  2/23/2023 1101 by Volodymyr Mendez RN  Outcome: Progressing  Goal: Maintains or returns to baseline bowel function  2/23/2023 1346 by Dina Mejía RN  Outcome: Completed  2/23/2023 1101 by Volodymyr Mendez RN  Outcome: Progressing

## 2023-02-23 NOTE — DISCHARGE SUMMARY
[place] [id number] Internal Medicine    Discharge Summary     Patient ID: Hannah Ellis  :  1970   MRN: [id number]     ACCOUNT:  [de-identified]   Patient's PCP: Nori Mccarthy MD  Admit Date: 2023   Discharge Date: 2023    Length of Stay: 3  Code Status:  Full Code  Admitting Physician: Jessica Burk MD  Discharge Physician: Jessica Burk MD     Active Discharge Diagnoses:     Primary Problem  Retroperitoneal mass      Hospital Problems  Active Hospital Problems    Diagnosis Date Noted    Retroperitoneal mass [R19.00] 2023     Priority: Medium       Admission Condition:  poor     Discharged Condition: fair    Hospital Stay:     Hospital Course:  Hannah Ellis is a 46 y.o. male who was admitted for the management of Retroperitoneal mass , presented to ER with Abnormal CT  Patient is a very healthy young man, does not take any medication on regular basis, he was recently admitted at [facility], diagnosed with right-sided DVT and bilateral PE, patient underwent echo, which was negative for strain pattern  Patient was discharged home on Eliquis  He presented to his PCP office, complaining of weight loss, abdominal pain, nausea. Symptoms are going on for last few weeks  Patient underwent CT abdomen pelvis suggestive of retroperitoneal mass  Patient, underwent IR guided biopsy  Getting discharged home  Will follow with oncology as output      Significant therapeutic interventions:     Significant Diagnostic Studies:   Labs / Micro:        ,     Radiology:    XR CHEST (2 VW)    Result Date: 2023  EXAMINATION: TWO XRAY VIEWS OF THE CHEST 2023 11:23 am COMPARISON: 2023 HISTORY: ORDERING SYSTEM PROVIDED HISTORY: SOB (shortness of breath) TECHNOLOGIST PROVIDED HISTORY: Reason for Exam: patient c/o sob and states he is on antibiotics but not getting any better FINDINGS: The lungs are without acute focal process.   There is no effusion or pneumothorax. The cardiomediastinal silhouette is without acute process. The osseous structures are without acute process. No acute process. XR CHEST (2 VW)    Result Date: 2/7/2023  EXAMINATION: TWO XRAY VIEWS OF THE CHEST 2/7/2023 11:03 am COMPARISON: None. HISTORY: ORDERING SYSTEM PROVIDED HISTORY: SOB (shortness of breath) TECHNOLOGIST PROVIDED HISTORY: Reason for Exam: pt stated SOB , cough x few wks FINDINGS: Normal cardiomediastinal silhouette. Subtle patchy bibasilar opacities more apparent at the left costophrenic angle on the PA view. No discrete area of consolidation no pleural effusion. No pneumothorax. No acute bony abnormality. Mild patchy bibasilar opacities more apparent at the left costophrenic angle. 6-8 week follow-up may be considered. CT HEAD WO CONTRAST    Result Date: 2/21/2023  EXAMINATION: CT OF THE HEAD WITHOUT CONTRAST  2/21/2023 2:33 pm TECHNIQUE: CT of the head was performed without the administration of intravenous contrast. Automated exposure control, iterative reconstruction, and/or weight based adjustment of the mA/kV was utilized to reduce the radiation dose to as low as reasonably achievable. COMPARISON: None. HISTORY: ORDERING SYSTEM PROVIDED HISTORY: Headache TECHNOLOGIST PROVIDED HISTORY: Headache Reason for Exam: Headache FINDINGS: BRAIN/VENTRICLES: There is no acute intracranial hemorrhage, mass effect or midline shift. No abnormal extra-axial fluid collection. The gray-white differentiation is maintained without evidence of an acute infarct. There is no evidence of hydrocephalus. ORBITS: The visualized portion of the orbits demonstrate no acute abnormality. SINUSES: The visualized paranasal sinuses and mastoid air cells demonstrate no acute abnormality. SOFT TISSUES/SKULL:  No acute abnormality of the visualized skull or soft tissues. No acute intracranial abnormality.      CT ABDOMEN PELVIS W IV CONTRAST Additional Contrast? Oral    Result Date: 2/20/2023  EXAMINATION: CT OF THE ABDOMEN AND PELVIS WITH CONTRAST 2/20/2023 12:24 pm TECHNIQUE: CT of the abdomen and pelvis was performed with the administration of intravenous contrast. Multiplanar reformatted images are provided for review. Automated exposure control, iterative reconstruction, and/or weight based adjustment of the mA/kV was utilized to reduce the radiation dose to as low as reasonably achievable. COMPARISON: None. HISTORY: ORDERING SYSTEM PROVIDED HISTORY: Pulmonary embolism without acute cor pulmonale, unspecified chronicity, unspecified pulmonary embolism type ([address] TECHNOLOGIST PROVIDED HISTORY: STAT Creatinine as needed:->No Reason for Exam: Pulmonary embolism without acute cor pulmonale, unspecified chronicity, unspecified pulmonary embolism type ([address] Acute deep vein thrombosis (DVT) of femoral vein of right lower extremity ([address] Abdominal pain, left lower quadrant Additional signs and symptoms: Pt c/o loss of appetite and  nausea after eating x 3 weeks FINDINGS: Lower Chest: Minimal ectatic changes both lower lobes. Organs: The liver demonstrates mild fatty infiltration but no focal disease. Gallbladder, pancreas and spleen, adrenals, kidneys, aorta and IVC appear stable. GI/Bowel: No evidence of bowel obstruction or perforation. No evidence of acute appendicitis or diverticulitis. Pelvis: Urinary bladder, prostate and seminal vesicles appear unremarkable. Peritoneum/Retroperitoneum: No evidence of lymphadenopathy. In the left abdomen, likely retroperitoneum, a 7.2 x 7 cm in the largest axial dimension and 6.7 x 10.9 cm in largest coronal dimension. It is mostly fatty with mixed density and also contains an area of soft tissue density and mild calcification. There is concern for tumor and possibly liposarcoma. This is not associated with clear invasive features at this time. Bones/Soft Tissues: No acute abnormality.      1. Suspicious mass likely in the left retroperitoneum measuring 7.2 x 7 cm in the axial dimension and 6.7 x 10.9 cm in the coronal dimension is mostly fatty with inhomogeneity with small areas of soft tissue density and mild calcification. There is concern for liposarcoma. 2. Mild fatty liver but no focal disease. 3. No acute gastrointestinal abnormality. RECOMMENDATIONS: CT-guided biopsy would be feasible. XR CHEST PORTABLE    Result Date: 2/14/2023  EXAMINATION: ONE XRAY VIEW OF THE CHEST 2/14/2023 8:06 am COMPARISON: Chest radiographs 02/13/2023, 02/07/2023 HISTORY: ORDERING SYSTEM PROVIDED HISTORY: SOB TECHNOLOGIST PROVIDED HISTORY: SOB FINDINGS: AP upright Lines/tubes: None. Mediastinum: No mediastinal widening or shift or cardiomegaly. Lungs/pleura: Low lung volumes. Right greater than left basilar pulmonary opacity. No pneumothorax or large pleural effusion. Bones: No acute findings. Mild right greater than left basilar pulmonary opacities on a background of low lung volumes likely representing subsegmental atelectasis. These opacities have increased on the right since yesterday. CT CHEST PULMONARY EMBOLISM W CONTRAST    Result Date: 2/14/2023  EXAMINATION: CTA OF THE CHEST 2/14/2023 9:26 am TECHNIQUE: CTA of the chest was performed after the administration of intravenous contrast.  Multiplanar reformatted images are provided for review. MIP images are provided for review. Automated exposure control, iterative reconstruction, and/or weight based adjustment of the mA/kV was utilized to reduce the radiation dose to as low as reasonably achievable. COMPARISON: None.  HISTORY: ORDERING SYSTEM PROVIDED HISTORY: SOB, elevated d-dimer TECHNOLOGIST PROVIDED HISTORY: SOB, elevated d-dimer Decision Support Exception - unselect if not a suspected or confirmed emergency medical condition->Emergency Medical Condition (MA) Reason for Exam: Shortness of breath, elevated d-dimer Additional signs and symptoms: Pt c/o shortness of breath, nausea, poor appetite, fatigue x 2 weeks Relevant Medical/Surgical History: No hx diabetes, cancer or surgery to area of interest FINDINGS: Limited by large amount of streak artifacts. Pulmonary Arteries: There are filling defects seen right lower lobe and middle lobe pulmonary arterial branches compatible with acute pulmonary embolism. Similar defects in subsegmental left lower lobe branches and also in the lingular branch. Minimal in bilateral upper lobe subsegmental branches. No right heart strain. Pulmonary trunk normal size. Mediastinum: A few scattered mediastinal lymph nodes. Largest 13 mm short axis along side the aortic arch. Thyroid gland and esophagus grossly normal. Cardiac size normal.  Normal caliber aorta. Lungs/pleura: Limited by motion artifact. Patchy subsegmental atelectasis in the posterior lung bases. Minimal in the anterior right lung base. No suspicious lung mass. No significant nodules. No pleural effusion or pneumothorax. Upper Abdomen: Limited images of the upper abdomen show no acute process. Soft Tissues/Bones: No acute bone or soft tissue abnormality. 1. Bilateral acute pulmonary emboli most pronounced in the right lower lobe branches. Mild to moderate thrombus load. No right heart strain. 2. Subsegmental atelectasis at lung bases. Findings were discussed with Raul Blankenship at 9:46 a.m. on 2/14/2023.      VL DUP LOWER EXTREMITY VENOUS BILATERAL    Result Date: 2/15/2023    [facility]  Vascular Lower Extremities DVT Study Procedure   Patient Name  [facility]      Date of Study           02/14/2023                Daily Rios   Date of Birth 1970  Gender                  Male   Age           46 year(s)  Race                       Room Number   2051        Height:                 70 inch, 177.8 cm   Corporate ID  [id number]    Weight:                 233 pounds, 105.7 kg  #   Patient Acct  [de-identified]   BSA:        2.23 m^2    BMI:       33.43 kg/m^2  #   MR #          [id number] Sonographer             David Landeros RVT   Accession #   [id number]  Interpreting Physician  Pancho Hood   Referring                 Referring Physician     Rashard Oden MD  Nurse  Practitioner  Procedure Type of Study:   Veins: Lower Extremities DVT Study, Venous Scan Lower Bilateral.  Indications for Study:Elevated D-Dimer and Leg Swelling. Patient Status:ER. Technical Quality:Adequate visualization.   - Critical Result:Dr. Melba Mendenhall. Conclusions   Summary   Simultaneous real time imaging utilizing B-Mode, color doppler and  spectral waveform analysis was performed on the bilateral lower  extremities for venous examination of the deep and superficial systems. Findings are:   Right:  Acute deep venous thrombosis identified in the popliteal vein and tibial  peroneal trunk. Left:  No evidence of deep or superficial venous thrombosis. Signature   ----------------------------------------------------------------  Electronically signed by David Landeros RVT(Sonographer) on  02/14/2023 09:16 AM  ----------------------------------------------------------------   ----------------------------------------------------------------  Electronically signed by Agus Kuhn(Interpreting  physician) on 02/15/2023 08:36 AM  ----------------------------------------------------------------  Findings:   Right Impression:                    Left Impression:  The common femoral, femoral,         The common femoral, femoral,  popliteal and tibial veins           popliteal and tibial veins  demonstrate normal compressibility   demonstrate normal compressibility  and augmentation. and augmentation. The popliteal vein and the           Normal compressibility of the great  tibio-peroneal trunk are dilated and saphenous vein. non-compressible with hypoechoic  echoes. Normal compressibility of the small                                       saphenous vein.   Normal compressibility of the great  saphenous vein. Normal compressibility of the small  saphenous vein. Enlarged lymph nodes are noted at  the right groin level. Velocities are measured in cm/s ; Diameters are measured in cm Right Lower Extremities DVT Study Measurements Right 2D Measurements +------------------------------------+----------+---------------+----------+ ! Location                            ! Visualized! Compressibility! Thrombosis! +------------------------------------+----------+---------------+----------+ ! Common Femoral                      !Yes       ! Yes            ! None      ! +------------------------------------+----------+---------------+----------+ ! Prox Femoral                        !Yes       ! Yes            ! None      ! +------------------------------------+----------+---------------+----------+ ! Mid Femoral                         !Yes       ! Yes            ! None      ! +------------------------------------+----------+---------------+----------+ ! Dist Femoral                        !Yes       ! Yes            ! None      ! +------------------------------------+----------+---------------+----------+ ! Deep Femoral                        !Yes       ! Yes            ! None      ! +------------------------------------+----------+---------------+----------+ ! Popliteal                           !Yes       ! No             !          ! +------------------------------------+----------+---------------+----------+ ! Sapheno Femoral Junction            ! Yes       ! Yes            ! None      ! +------------------------------------+----------+---------------+----------+ ! PTV                                 ! Yes       ! Yes            ! None      ! +------------------------------------+----------+---------------+----------+ ! Peroneal                            !Yes       ! Yes            ! None      ! +------------------------------------+----------+---------------+----------+ ! Gastroc                             ! Yes       ! Yes !None      ! +------------------------------------+----------+---------------+----------+ ! GSV Thigh                           ! Yes       ! Yes            ! None      ! +------------------------------------+----------+---------------+----------+ ! GSV Knee                            ! Yes       ! Yes            ! None      ! +------------------------------------+----------+---------------+----------+ ! GSV Ankle                           ! Yes       ! Yes            ! None      ! +------------------------------------+----------+---------------+----------+ ! SSV                                 ! Yes       ! Yes            ! None      ! +------------------------------------+----------+---------------+----------+ Left Lower Extremities DVT Study Measurements Left 2D Measurements +------------------------------------+----------+---------------+----------+ ! Location                            ! Visualized! Compressibility! Thrombosis! +------------------------------------+----------+---------------+----------+ ! Common Femoral                      !Yes       ! Yes            ! None      ! +------------------------------------+----------+---------------+----------+ ! Prox Femoral                        !Yes       ! Yes            ! None      ! +------------------------------------+----------+---------------+----------+ ! Mid Femoral                         !Yes       ! Yes            ! None      ! +------------------------------------+----------+---------------+----------+ ! Dist Femoral                        !Yes       ! Yes            ! None      ! +------------------------------------+----------+---------------+----------+ ! Deep Femoral                        !Yes       ! Yes            ! None      ! +------------------------------------+----------+---------------+----------+ ! Gabrieliteal                           !Yes       ! Yes            ! None      ! +------------------------------------+----------+---------------+----------+ ! Roc Femoral Junction            ! Yes       ! Yes            ! None      ! +------------------------------------+----------+---------------+----------+ ! PTV                                 ! Yes       ! Yes            ! None      ! +------------------------------------+----------+---------------+----------+ ! Peroneal                            !Yes       ! Yes            ! None      ! +------------------------------------+----------+---------------+----------+ ! Gastroc                             ! Yes       ! Yes            ! None      ! +------------------------------------+----------+---------------+----------+ ! GSV Thigh                           ! Yes       ! Yes            ! None      ! +------------------------------------+----------+---------------+----------+ ! GSV Knee                            ! Yes       ! Yes            ! None      ! +------------------------------------+----------+---------------+----------+ ! GSV Ankle                           ! Yes       ! Yes            ! None      ! +------------------------------------+----------+---------------+----------+ ! SSV                                 ! Yes       ! Yes            ! None      ! +------------------------------------+----------+---------------+----------+    CT BIOPSY ABDOMEN RETROPERITONEUM    Result Date: 2/22/2023  PROCEDURE: CT GUIDED CORE BIOPSY left retroperitoneal mass MODERATE CONSCIOUS SEDATION 2/22/2023 HISTORY: ORDERING SYSTEM PROVIDED HISTORY: new left-sided retroperitoneal mass TECHNOLOGIST PROVIDED HISTORY: new left-sided retroperitoneal mass PHYSICIANS: Marea Curling, MD SEDATION: Moderate sedation was ordered and supervised by the attending with physician face-to-face monitoring. Medications were provided and recorded by Radiology nurses. TECHNIQUE: Informed consent was obtained after a detailed discussion about the procedure including the risk, benefits, and alternatives. Universal protocol was followed.   Sterile gowns, masks, hats and gloves utilized for maximal sterile barrier. Axial images were obtained through the left lower abdomen and a suitable skin site was prepped and draped in sterile fashion. Local anesthesia was achieved with lidocaine. Core biopsy was performed with CT guidance. 3 18 gauge core biopsy specimens were obtained using coaxial technique targeting the most solid/soft tissue component of the mass and the patient tolerated the procedure well. Dose modulation, iterative reconstruction, and/or weight based adjustment of the mA/kV was utilized to reduce the radiation dose to as low as reasonably achievable. FINDINGS: No immediate postprocedural complications     Successful CT guided core biopsy of the left retroperitoneal mass targeting the most soft tissue component. Consultations:    Consults:     Final Specialist Recommendations/Findings:   IP CONSULT TO INTERNAL MEDICINE  IP CONSULT TO ONCOLOGY      The patient was seen and examined on day of discharge and this discharge summary is in conjunction with any daily progress note from day of discharge. Discharge plan:     Disposition: Home    Physician Follow Up: Blanch Brunner, MD  [address] [place] [phone]             Requiring Further Evaluation/Follow Up POST HOSPITALIZATION/Incidental Findings:    Diet: cardiac diet    Activity: As tolerated    Instructions to Patient:     Discharge Medications:      Medication List        CHANGE how you take these medications      apixaban 5 MG Tabs tablet  Commonly known as: ELIQUIS  Take 1 tablet by mouth 2 times daily  What changed: how much to take            CONTINUE taking these medications      guaiFENesin 600 MG extended release tablet  Commonly known as: MUCINEX     HYDROcodone-acetaminophen 5-325 MG per tablet  Commonly known as: Norco  Take 1 tablet by mouth 3 times daily as needed for Pain for up to 3 days. Intended supply: 3 days.  Take lowest dose possible to manage pain Max Daily Amount: 3 tablets            STOP taking these medications      benzonatate 100 MG capsule  Commonly known as: TESSALON     ciprofloxacin 500 MG tablet  Commonly known as: Cipro     metroNIDAZOLE 500 MG tablet  Commonly known as: FLAGYL     therapeutic multivitamin-minerals tablet               Where to Get Your Medications        These medications were sent to [address] [facility], [address] [address]      Phone: [phone]   apixaban 5 MG Tabs tablet         Time Spent on discharge is  35 mins in patient examination, evaluation, counseling as well as medication reconciliation, prescriptions for required medications, discharge plan and follow up. Electronically signed by   Raul Vera MD  2/23/2023  3:59 PM      Thank you Dr. Rima Collazo MD for the opportunity to be involved in this patient's care.

## 2023-02-23 NOTE — PLAN OF CARE
Problem: Discharge Planning  Goal: Discharge to home or other facility with appropriate resources  Outcome: Progressing     Problem: ABCDS Injury Assessment  Goal: Absence of physical injury  Outcome: Progressing  Flowsheets (Taken 2/22/2023 2234 by Jordana Mock RN)  Absence of Physical Injury: Implement safety measures based on patient assessment     Problem: Pain  Goal: Verbalizes/displays adequate comfort level or baseline comfort level  Outcome: Progressing  Note: Assess the location, characteristics, onset, duration, frequency, quality, and severity of pain. Encourage immediate report of pain. Use appropriate pain scale to rate pain. Manage pain using nonpharmacologic/pharmacologic interventions.       Problem: Skin/Tissue Integrity - Adult  Goal: Incisions, wounds, or drain sites healing without S/S of infection  Outcome: Progressing     Problem: Gastrointestinal - Adult  Goal: Minimal or absence of nausea and vomiting  Outcome: Progressing  Goal: Maintains or returns to baseline bowel function  Outcome: Progressing

## 2023-02-23 NOTE — DISCHARGE INSTRUCTIONS
Your information:  Name: Chrissy Gregory  : 1970    Your instructions:    N/a    What to do after you leave the hospital:    Recommended diet: regular diet    Recommended activity: activity as tolerated        The following personal items were collected during your admission and were returned to you:    Belongings  Dental Appliances: None  Vision - Corrective Lenses: Eyeglasses  Hearing Aid: None  Clothing: Pants, Shirt, Socks, Gloves, Slippers  Jewelry: None  Electronic Devices: Cell Phone,   Weapons (Notify Protective Services/Security): None  Home Medications: Kept at bedside  Valuables Given To: Patient  Provide Name(s) of Who Valuable(s) Were Given To: Leah Taylor    Information obtained by:  By signing below, I understand that if any problems occur once I leave the hospital I am to contact my PCP or go to the ER. I understand and acknowledge receipt of the instructions indicated above.

## 2023-02-23 NOTE — PROGRESS NOTES
_                         Today's Date: 2/23/2023  Patient Name: Dallin Hall  Date of admission: 2/20/2023  6:56 PM  Patient's age: 46 y.o., 1970  Admission Dx: Retroperitoneal mass [R19.00]  Anticoagulated [Z79.01]  Intraabdominal mass [R19.00]      Requesting Physician: Yuridia Rm MD    CHIEF COMPLAINT: Abdominal pain. Intra-abdominal mass. Recent PE. SUBJECTIVE:  . The patient was seen and examined. Clinically stable. He had CT-guided biopsy yesterday. No complications. Tolerated well. He has pain in that area. No other complaints. BRIEF CASE HISTORY:    The patient is a 46 y.o.  male who is admitted to the hospital for further management of abdominal pain and intra-abdominal mass. The patient was recently hospitalized due to bilateral pulmonary embolism. He was started on Eliquis. He did better with less shortness of breath. He presented to the ER with increasing abdominal pain and nausea. He had significant decrease in appetite. Patient states that for the last 2 to 3 weeks he lost about 10 pounds and he had decreased appetite. He also had night sweats. No documented fever. No nausea or vomiting. Past Medical History:   has no past medical history on file. Past Surgical History:   has a past surgical history that includes CT BIOPSY ABDOMEN RETROPERITONEUM (2/22/2023). Family History: family history is not on file. Social History:   reports that he has never smoked. He has never used smokeless tobacco. He reports that he does not currently use alcohol. He reports that he does not currently use drugs after having used the following drugs: Marijuana [place]). Medications:    Prior to Admission medications    Medication Sig Start Date End Date Taking?  Authorizing Provider   apixaban (ELIQUIS) 5 MG TABS tablet Take 1 tablet by mouth 2 times daily 2/22/23  Yes Yuridia Rm MD   HYDROcodone-acetaminophen [facility] 5-325 MG per tablet Take 1 tablet by mouth 3 times daily as needed for Pain for up to 3 days. Intended supply: 3 days. Take lowest dose possible to manage pain Max Daily Amount: 3 tablets 2/20/23 2/23/23  Lynnae Prader, MD   guaiFENesin (MUCINEX) 600 MG extended release tablet Take 600 mg by mouth 2 times daily as needed for Congestion    Historical Provider, MD     Current Facility-Administered Medications   Medication Dose Route Frequency Provider Last Rate Last Admin    apixaban (ELIQUIS) tablet 5 mg  5 mg Oral BID Eusebio Quezada MD   5 mg at 02/22/23 2010    HYDROcodone-acetaminophen (NORCO) 5-325 MG per tablet 1 tablet  1 tablet Oral TID PRN Rommie Mukund, APRN - NP        sodium chloride flush 0.9 % injection 5-40 mL  5-40 mL IntraVENous 2 times per day Rommie Mukund, APRN - NP   10 mL at 02/21/23 2300    sodium chloride flush 0.9 % injection 5-40 mL  5-40 mL IntraVENous PRN Ying Goldi, APRN - NP        0.9 % sodium chloride infusion   IntraVENous PRN Rommie Mukund, APRN - NP        ondansetron (ZOFRAN-ODT) disintegrating tablet 4 mg  4 mg Oral Q8H PRN Rommie Mukund, APRN - NP        Or    ondansetron (ZOFRAN) injection 4 mg  4 mg IntraVENous Q6H PRN Ying Goldi, APRN - NP        polyethylene glycol (GLYCOLAX) packet 17 g  17 g Oral Daily PRN Rommie Mukund, APRN - NP        acetaminophen (TYLENOL) tablet 650 mg  650 mg Oral Q6H PRN Rommie Mukund, APRN - NP        Or    acetaminophen (TYLENOL) suppository 650 mg  650 mg Rectal Q6H PRN Rommie Mukund, APRN - NP        0.9 % sodium chloride infusion   IntraVENous Continuous Rommie Mukund, APRN - NP 75 mL/hr at 02/23/23 0508 New Bag at 02/23/23 0508     Current Outpatient Medications   Medication Sig Dispense Refill    apixaban (ELIQUIS) 5 MG TABS tablet Take 1 tablet by mouth 2 times daily 60 tablet 3    HYDROcodone-acetaminophen (NORCO) 5-325 MG per tablet Take 1 tablet by mouth 3 times daily as needed for Pain for up to 3 days.  Intended supply: 3 days. Take lowest dose possible to manage pain Max Daily Amount: 3 tablets 9 tablet 0    guaiFENesin (MUCINEX) 600 MG extended release tablet Take 600 mg by mouth 2 times daily as needed for Congestion         Allergies:  Seasonal    REVIEW OF SYSTEMS:      General: No weakness or fatigue. No unanticipated weight loss or decreased appetite. No fever or chills. Eyes: No blurred vision, eye pain or double vision. Ears: No hearing problems or drainage. No tinnitus. Throat: No sore throat, problems with swallowing or dysphagia. Respiratory: As above. Cardiovascular: No chest pain, orthopnea or PND. No lower extremity edema. No palpitation. Gastrointestinal: As above. Genitourinary: No dysuria, hematuria, frequency or urgency. Musculoskeletal: No muscle aches or pains. No limitation of movement. No back pain. No gait disturbance, No joint complaints. Dermatologic: No skin rashes or pruritus. No skin lesions or discolorations. Psychiatric: No depression, anxiety, or stress or signs of schizophrenia. No change in mood or affect. Hematologic: No history of bleeding tendency. No bruises or ecchymosis. No history of clotting problems. Infectious disease: No fever, chills or frequent infections. Endocrine: No polydipsia or polyuria. No temperature intolerance. Neurologic: No headaches or dizziness. No weakness or numbness of the extremities. No changes in balance, coordination,  memory, mentation, behavior. Allergic/Immunologic: No nasal congestion or hives. No repeated infections.        PHYSICAL EXAM:      BP (!) 139/92   Pulse 75   Temp 98.1 °F (36.7 °C)   Resp 16   Ht 5' 10\" (1.778 m)   Wt 218 lb 7.6 oz (99.1 kg)   SpO2 96%   BMI 31.35 kg/m²    Temp (24hrs), Av °F (36.7 °C), Min:97.9 °F (36.6 °C), Max:98.1 °F (36.7 °C)      General appearance - not in pain or distress  Mental status - alert and oriented  Eyes - pupils equal and reactive, limited movement of the right eye  Ears - bilateral TM's and external ear canals normal  Nose - normal and patent, no erythema, discharge or polyps  Mouth - mucous membranes moist, pharynx normal without lesions  Neck - supple, no significant adenopathy  Lymphatics - no palpable lymphadenopathy, no hepatosplenomegaly  Chest - clear to auscultation, no wheezes, rales or rhonchi, symmetric air entry  Heart - normal rate, regular rhythm, normal S1, S2, no murmurs, rubs, clicks or gallops  Abdomen - soft, mild abdominal tenderness. , nondistended, no masses or organomegaly  Neurological - alert, oriented, normal speech, no focal findings or movement disorder noted  Musculoskeletal - no joint tenderness, deformity or swelling  Extremities - peripheral pulses normal, no pedal edema, no clubbing or cyanosis  Skin - normal coloration and turgor, no rashes, no suspicious skin lesions noted           DATA:      Labs:       CBC:   Recent Labs     02/22/23 0448 02/23/23  0528   WBC 4.0 4.7   HGB 13.3* 13.8   HCT 39.6* 41.1    354     BMP:   Recent Labs     02/22/23 0448 02/23/23  0528    133*   K 4.4 4.0   CO2 25 25   BUN 8 8   CREATININE 0.62* 0.62*   LABGLOM >60 >60   GLUCOSE 104* 112*     PT/INR:   Recent Labs     02/20/23 1930 02/22/23 0448   PROTIME 14.0 13.5   INR 1.1 1.0     APTT:  Recent Labs     02/22/23 0448 02/22/23  1227   APTT 57.4* 27.9     LIVER PROFILE:  Recent Labs     02/20/23 1930   AST 70*   *   LABALBU 4.0     CT BIOPSY ABDOMEN RETROPERITONEUM  Narrative: PROCEDURE:  CT GUIDED CORE BIOPSY left retroperitoneal mass    MODERATE CONSCIOUS SEDATION    2/22/2023    HISTORY:  ORDERING SYSTEM PROVIDED HISTORY: new left-sided retroperitoneal mass  TECHNOLOGIST PROVIDED HISTORY:  new left-sided retroperitoneal mass    PHYSICIANS:  Liana Saab MD    SEDATION:  Moderate sedation was ordered and supervised by the attending with physician  face-to-face monitoring.  Medications were provided and recorded by Radiology  nurses. TECHNIQUE:  Informed consent was obtained after a detailed discussion about the procedure  including the risk, benefits, and alternatives. Universal protocol was  followed. Sterile gowns, masks, hats and gloves utilized for maximal sterile  barrier. Axial images were obtained through the left lower abdomen and a  suitable skin site was prepped and draped in sterile fashion. Local  anesthesia was achieved with lidocaine. Core biopsy was performed with CT  guidance. 3 18 gauge core biopsy specimens were obtained using coaxial  technique targeting the most solid/soft tissue component of the mass and the  patient tolerated the procedure well. Dose modulation, iterative reconstruction, and/or weight based adjustment of  the mA/kV was utilized to reduce the radiation dose to as low as reasonably  achievable. FINDINGS:  No immediate postprocedural complications  Impression: Successful CT guided core biopsy of the left retroperitoneal mass targeting  the most soft tissue component. IMPRESSION:    Primary Problem  Retroperitoneal mass    Active Hospital Problems    Diagnosis Date Noted    Retroperitoneal mass [R19.00] 02/20/2023     Priority: Medium   Recent PE and DVT  Retroperitoneal mass, likely malignant  Probable hypercoagulable state secondary to probable underlying malignancy. RECOMMENDATIONS:  Records and labs and images were reviewed and discussed with the patient. Patient had recent DVT and PE and started on Eliquis. Currently he presents with abdominal symptoms and was found to have large retroperitoneal mass. Differential would be lymphoma or other solid malignancies. He had CT-guided needle biopsy yesterday. No complications. .  Further recommendation will be based on the pathology results. I have explained to the patient different possibilities and possible management.   Very likely patient's PE and DVT are related to hypercoagulable state secondary to underlying malignancy. Anticoagulation will be resumed in few hours. Patient's questions were answered to the best of his satisfaction and he verbalized full understanding and agreement. Angi Nash MD, MD                            [address] [place] [address] [facility]                            This note is created with the assistance of a speech recognition program.  While intending to generate a document that actually reflects the content of the visit, the document can still have some errors including those of syntax and sound a like substitutions which may escape proof reading. It such instances, actual meaning can be extrapolated by contextual diversion.

## 2023-02-24 LAB — SURGICAL PATHOLOGY REPORT: NORMAL

## 2023-02-27 ENCOUNTER — TELEPHONE (OUTPATIENT)
Dept: INTERNAL MEDICINE CLINIC | Age: 53
End: 2023-02-27

## 2023-02-27 ENCOUNTER — OFFICE VISIT (OUTPATIENT)
Dept: INTERNAL MEDICINE CLINIC | Age: 53
End: 2023-02-27
Payer: COMMERCIAL

## 2023-02-27 VITALS
DIASTOLIC BLOOD PRESSURE: 80 MMHG | BODY MASS INDEX: 30.71 KG/M2 | SYSTOLIC BLOOD PRESSURE: 126 MMHG | HEART RATE: 84 BPM | OXYGEN SATURATION: 98 % | WEIGHT: 214 LBS

## 2023-02-27 DIAGNOSIS — I26.99 PULMONARY EMBOLISM WITHOUT ACUTE COR PULMONALE, UNSPECIFIED CHRONICITY, UNSPECIFIED PULMONARY EMBOLISM TYPE (HCC): Primary | ICD-10-CM

## 2023-02-27 PROCEDURE — G8417 CALC BMI ABV UP PARAM F/U: HCPCS | Performed by: INTERNAL MEDICINE

## 2023-02-27 PROCEDURE — 1111F DSCHRG MED/CURRENT MED MERGE: CPT | Performed by: INTERNAL MEDICINE

## 2023-02-27 PROCEDURE — 1036F TOBACCO NON-USER: CPT | Performed by: INTERNAL MEDICINE

## 2023-02-27 PROCEDURE — G8427 DOCREV CUR MEDS BY ELIG CLIN: HCPCS | Performed by: INTERNAL MEDICINE

## 2023-02-27 PROCEDURE — 99213 OFFICE O/P EST LOW 20 MIN: CPT | Performed by: INTERNAL MEDICINE

## 2023-02-27 PROCEDURE — 3017F COLORECTAL CA SCREEN DOC REV: CPT | Performed by: INTERNAL MEDICINE

## 2023-02-27 PROCEDURE — G8484 FLU IMMUNIZE NO ADMIN: HCPCS | Performed by: INTERNAL MEDICINE

## 2023-02-27 ASSESSMENT — ENCOUNTER SYMPTOMS
ABDOMINAL PAIN: 1
DIFFICULTY BREATHING: 1
SHORTNESS OF BREATH: 1

## 2023-02-27 ASSESSMENT — COPD QUESTIONNAIRES: COPD: 1

## 2023-02-27 NOTE — PROGRESS NOTES
[address] [facility] [address]  [place] [address]  Dept: [phone]  Dept Fax: [phone]    Otilia Xiao is a 46 y.o. male who presents today for his medicalconditions/complaints as noted below. Otilia Xiao is c/o of Results and Letter for School/Work      HPI:     COPD  He complains of difficulty breathing and shortness of breath. Primary symptoms comments: Had pulmonary embolism 2/20/23. Taking eliquis. Feeling better. . The current episode started 1 to 4 weeks ago. Mass  This is a new (found on CT scan thought to be lipoma or possible lyosarcoma. biopsied Wednesday.) problem. Associated symptoms include abdominal pain (some fullness with eating.). Treatments tried: biopsy. seeing Dr. Jeanine Arthur. No past medical history on file. Current Outpatient Medications   Medication Sig Dispense Refill    apixaban (ELIQUIS) 5 MG TABS tablet Take 1 tablet by mouth 2 times daily 60 tablet 3    guaiFENesin (MUCINEX) 600 MG extended release tablet Take 600 mg by mouth 2 times daily as needed for Congestion (Patient not taking: Reported on 2/27/2023)       No current facility-administered medications for this visit. Allergies   Allergen Reactions    Seasonal        Health Maintenance   Topic Date Due    HIV screen  Never done    Hepatitis C screen  Never done    DTaP/Tdap/Td vaccine (1 - Tdap) Never done    Diabetes screen  Never done    Lipids  Never done    Shingles vaccine (1 of 2) Never done    Flu vaccine (1) Never done    Depression Screen  02/16/2024    Colorectal Cancer Screen  02/14/2026    COVID-19 Vaccine  Completed    Hepatitis A vaccine  Aged Out    Hib vaccine  Aged Out    Meningococcal (ACWY) vaccine  Aged Out    Pneumococcal 0-64 years Vaccine  Aged Out       Subjective:      Review of Systems   Respiratory:  Positive for shortness of breath. Gastrointestinal:  Positive for abdominal pain (some fullness with eating. ).    All other systems reviewed and are negative. Objective:     Physical Exam  Vitals reviewed. Constitutional:       Appearance: He is well-developed. HENT:      Head: Normocephalic and atraumatic. Eyes:      Conjunctiva/sclera: Conjunctivae normal.      Pupils: Pupils are equal, round, and reactive to light. Neck:      Thyroid: No thyromegaly. Vascular: No JVD. Cardiovascular:      Rate and Rhythm: Normal rate and regular rhythm. Heart sounds: Normal heart sounds. No murmur heard. Pulmonary:      Effort: Pulmonary effort is normal.      Breath sounds: Normal breath sounds. Abdominal:      General: Bowel sounds are normal.      Palpations: Abdomen is soft. Musculoskeletal:         General: Normal range of motion. Cervical back: Normal range of motion and neck supple. Skin:     General: Skin is warm and dry. Neurological:      Mental Status: He is alert and oriented to person, place, and time. Deep Tendon Reflexes: Reflexes are normal and symmetric. /80 (Site: Left Upper Arm, Position: Sitting)   Pulse 84   Wt 214 lb (97.1 kg)   SpO2 98%   BMI 30.71 kg/m²       Assessment:       Diagnosis Orders   1. Pulmonary embolism without acute cor pulmonale, unspecified chronicity, unspecified pulmonary embolism type ([address]            Plan:      No follow-ups on file. No orders of the defined types were placed in this encounter. No orders of the defined types were placed in this encounter. Patient given educational materials - see patient instructions. Discussed use, benefit, and side effects of prescribed medications. All patientquestions answered. Pt voiced understanding.     Electronically signed by Zayda Castano MD on 2/27/2023at 4:33 PM

## 2023-02-27 NOTE — TELEPHONE ENCOUNTER
----- Message from [facility] sent at 2/24/2023 12:10 PM EST -----  Subject: Message to Provider    QUESTIONS  Information for Provider? Would like to schedule appt that would be after   the results come back. Pt also needs to see if they received the fax from   his work. Please call the pt back. You can text him if he doesnt answer,   his home has been having issues. ---------------------------------------------------------------------------  --------------  Chadd Hawley ZCWANTOLIN  [phone]; Do not leave any message, patient will call back for answer  ---------------------------------------------------------------------------  --------------  SCRIPT ANSWERS  Relationship to Patient?  Self

## 2023-02-27 NOTE — PROGRESS NOTES
Visit Information    Have you changed or started any medications since your last visit including any over-the-counter medicines, vitamins, or herbal medicines? no   Are you having any side effects from any of your medications? -  no  Have you stopped taking any of your medications? Is so, why? -  no    Have you seen any other physician or provider since your last visit? Yes - Records Obtained  Have you had any other diagnostic tests since your last visit? Yes - Records Obtained  Have you been seen in the emergency room and/or had an admission to a hospital since we last saw you? Yes - Records Obtained  Have you had your routine dental cleaning in the past 6 months? no    Have you activated your [employer] account? If not, what are your barriers?  No:      Patient Care Team:  Martita Hall MD as PCP - General (Internal Medicine)  Martita Hall MD as PCP - Empaneled Provider    Medical History Review  Past Medical, Family, and Social History reviewed and does contribute to the patient presenting condition    Health Maintenance   Topic Date Due    HIV screen  Never done    Hepatitis C screen  Never done    DTaP/Tdap/Td vaccine (1 - Tdap) Never done    Diabetes screen  Never done    Lipids  Never done    Shingles vaccine (1 of 2) Never done    Flu vaccine (1) Never done    Depression Screen  02/16/2024    Colorectal Cancer Screen  02/14/2026    COVID-19 Vaccine  Completed    Hepatitis A vaccine  Aged Out    Hib vaccine  Aged Out    Meningococcal (ACWY) vaccine  Aged Out    Pneumococcal 0-64 years Vaccine  Aged Out

## 2023-03-03 ENCOUNTER — OFFICE VISIT (OUTPATIENT)
Dept: ONCOLOGY | Age: 53
End: 2023-03-03
Payer: COMMERCIAL

## 2023-03-03 ENCOUNTER — TELEPHONE (OUTPATIENT)
Dept: INTERNAL MEDICINE CLINIC | Age: 53
End: 2023-03-03

## 2023-03-03 VITALS
HEIGHT: 70 IN | RESPIRATION RATE: 16 BRPM | SYSTOLIC BLOOD PRESSURE: 136 MMHG | WEIGHT: 216.9 LBS | BODY MASS INDEX: 31.05 KG/M2 | HEART RATE: 93 BPM | TEMPERATURE: 96.7 F | DIASTOLIC BLOOD PRESSURE: 89 MMHG

## 2023-03-03 DIAGNOSIS — D17.9 MYELOLIPOMA: Primary | ICD-10-CM

## 2023-03-03 DIAGNOSIS — R19.00 RETROPERITONEAL MASS: ICD-10-CM

## 2023-03-03 PROCEDURE — 99214 OFFICE O/P EST MOD 30 MIN: CPT | Performed by: INTERNAL MEDICINE

## 2023-03-03 PROCEDURE — 1111F DSCHRG MED/CURRENT MED MERGE: CPT | Performed by: INTERNAL MEDICINE

## 2023-03-03 PROCEDURE — 3017F COLORECTAL CA SCREEN DOC REV: CPT | Performed by: INTERNAL MEDICINE

## 2023-03-03 PROCEDURE — 1036F TOBACCO NON-USER: CPT | Performed by: INTERNAL MEDICINE

## 2023-03-03 PROCEDURE — G8484 FLU IMMUNIZE NO ADMIN: HCPCS | Performed by: INTERNAL MEDICINE

## 2023-03-03 PROCEDURE — G8427 DOCREV CUR MEDS BY ELIG CLIN: HCPCS | Performed by: INTERNAL MEDICINE

## 2023-03-03 PROCEDURE — G8417 CALC BMI ABV UP PARAM F/U: HCPCS | Performed by: INTERNAL MEDICINE

## 2023-03-03 PROCEDURE — 99211 OFF/OP EST MAY X REQ PHY/QHP: CPT | Performed by: INTERNAL MEDICINE

## 2023-03-03 NOTE — TELEPHONE ENCOUNTER
Patient contacted of in regards to her status of [employer] paper work , please return call at earliest convenience

## 2023-03-05 NOTE — PROGRESS NOTES
_                     Chief Complaint   Patient presents with    Follow-up     Kannan steve disease process     DIAGNOSIS:        Retroperitoneal mass biopsy positive for myelolipoma.   Recent PE and DVT   CURRENT THERAPY:         Eliquis  Refer to Dr Rodriguez  BRIEF CASE HISTORY:      The patient is a 52 y.o.  male who is admitted to the hospital for further management of abdominal pain and intra-abdominal mass.  The patient was recently hospitalized due to bilateral pulmonary embolism.  He was started on Eliquis.  He did better with less shortness of breath.  He presented to the ER with increasing abdominal pain and nausea.  He had significant decrease in appetite.  Patient states that for the last 2 to 3 weeks he lost about 10 pounds and he had decreased appetite.  He also had night sweats.  No documented fever.  No nausea or vomiting.    INTERIM HISTORY:   Seen for follow up retroperitoneal mass. Biopsy positive for myelolipoma. Continues to have abdominal pain. No N/V.   No resp distress. No fever.  Past Medical History:   has no past medical history on file.    Past Surgical History:   has a past surgical history that includes CT BIOPSY ABDOMEN RETROPERITONEUM (2/22/2023).   Family History: family history is not on file.  Social History:   reports that he has never smoked. He has never used smokeless tobacco. He reports that he does not currently use alcohol. He reports that he does not currently use drugs after having used the following drugs: Marijuana (Weed).   Medications:    Prior to Admission medications    Medication Sig Start Date End Date Taking? Authorizing Provider   apixaban (ELIQUIS) 5 MG TABS tablet Take 1 tablet by mouth 2 times daily 2/22/23  Yes Daniel Gaming MD   guaiFENesin (MUCINEX) 600 MG extended release tablet Take 600 mg by mouth 2 times daily as needed for Congestion  Patient not taking: No sig reported    Historical Provider, MD  Current Outpatient Medications   Medication Sig Dispense Refill    apixaban (ELIQUIS) 5 MG TABS tablet Take 1 tablet by mouth 2 times daily 60 tablet 3    guaiFENesin (MUCINEX) 600 MG extended release tablet Take 600 mg by mouth 2 times daily as needed for Congestion (Patient not taking: No sig reported)       No current facility-administered medications for this visit. Allergies:  Seasonal    REVIEW OF SYSTEMS:      General: No weakness or fatigue. No unanticipated weight loss or decreased appetite. No fever or chills. Eyes: No blurred vision, eye pain or double vision. Ears: No hearing problems or drainage. No tinnitus. Throat: No sore throat, problems with swallowing or dysphagia. Respiratory: As above. Cardiovascular: No chest pain, orthopnea or PND. No lower extremity edema. No palpitation. Gastrointestinal: As above. Genitourinary: No dysuria, hematuria, frequency or urgency. Musculoskeletal: No muscle aches or pains. No limitation of movement. No back pain. No gait disturbance, No joint complaints. Dermatologic: No skin rashes or pruritus. No skin lesions or discolorations. Psychiatric: No depression, anxiety, or stress or signs of schizophrenia. No change in mood or affect. Hematologic: No history of bleeding tendency. No bruises or ecchymosis. No history of clotting problems. Infectious disease: No fever, chills or frequent infections. Endocrine: No polydipsia or polyuria. No temperature intolerance. Neurologic: No headaches or dizziness. No weakness or numbness of the extremities. No changes in balance, coordination,  memory, mentation, behavior. Allergic/Immunologic: No nasal congestion or hives. No repeated infections.        PHYSICAL EXAM:      /89   Pulse 93   Temp (!) 96.7 °F (35.9 °C) (Temporal)   Resp 16   Ht 5' 10\" (1.778 m)   Wt 216 lb 14.4 oz (98.4 kg)   BMI 31.12 kg/m²    Temp (24hrs), Av °F (36.7 °C), Min:97.9 °F (36.6 °C), Max:98.1 °F (36.7 °C)      General appearance - not in pain or distress  Mental status - alert and oriented  Eyes - pupils equal and reactive, limited movement of the right eye  Ears - bilateral TM's and external ear canals normal  Nose - normal and patent, no erythema, discharge or polyps  Mouth - mucous membranes moist, pharynx normal without lesions  Neck - supple, no significant adenopathy  Lymphatics - no palpable lymphadenopathy, no hepatosplenomegaly  Chest - clear to auscultation, no wheezes, rales or rhonchi, symmetric air entry  Heart - normal rate, regular rhythm, normal S1, S2, no murmurs, rubs, clicks or gallops  Abdomen - soft, mild abdominal tenderness. , nondistended, no masses or organomegaly  Neurological - alert, oriented, normal speech, no focal findings or movement disorder noted  Musculoskeletal - no joint tenderness, deformity or swelling  Extremities - peripheral pulses normal, no pedal edema, no clubbing or cyanosis  Skin - normal coloration and turgor, no rashes, no suspicious skin lesions noted           DATA:      Labs:     Lab Results   Component Value Date    WBC 4.7 02/23/2023    HGB 13.8 02/23/2023    HCT 41.1 02/23/2023    MCV 90.8 02/23/2023     02/23/2023       Chemistry        Component Value Date/Time     (L) 02/23/2023 0528    K 4.0 02/23/2023 0528     02/23/2023 0528    CO2 25 02/23/2023 0528    BUN 8 02/23/2023 0528    CREATININE 0.62 (L) 02/23/2023 0528        Component Value Date/Time    CALCIUM 8.6 02/23/2023 0528    ALKPHOS 164 (H) 02/20/2023 1930    AST 70 (H) 02/20/2023 1930     (H) 02/20/2023 1930    BILITOT 1.0 02/20/2023 1930            LIVER PROFILE:  No results for input(s): AST, ALT, LABALBU in the last 72 hours.     CT BIOPSY ABDOMEN RETROPERITONEUM  Narrative: PROCEDURE:  CT GUIDED CORE BIOPSY left retroperitoneal mass    MODERATE CONSCIOUS SEDATION    2/22/2023    HISTORY:  ORDERING SYSTEM PROVIDED HISTORY: new left-sided retroperitoneal mass  TECHNOLOGIST PROVIDED HISTORY:  new left-sided retroperitoneal mass    PHYSICIANS:  Mel Leavitt MD    SEDATION:  Moderate sedation was ordered and supervised by the attending with physician  face-to-face monitoring. Medications were provided and recorded by Radiology  nurses. TECHNIQUE:  Informed consent was obtained after a detailed discussion about the procedure  including the risk, benefits, and alternatives. Universal protocol was  followed. Sterile gowns, masks, hats and gloves utilized for maximal sterile  barrier. Axial images were obtained through the left lower abdomen and a  suitable skin site was prepped and draped in sterile fashion. Local  anesthesia was achieved with lidocaine. Core biopsy was performed with CT  guidance. 3 18 gauge core biopsy specimens were obtained using coaxial  technique targeting the most solid/soft tissue component of the mass and the  patient tolerated the procedure well. Dose modulation, iterative reconstruction, and/or weight based adjustment of  the mA/kV was utilized to reduce the radiation dose to as low as reasonably  achievable. FINDINGS:  No immediate postprocedural complications  Impression: Successful CT guided core biopsy of the left retroperitoneal mass targeting  the most soft tissue component. Component 2/22/23 1110   Surgical Pathology Report -- Diagnosis --     Left retroperitoneal mass, core biopsy:     -  Myelolipoma. IMPRESSION:    Retroperitoneal mass biopsy positive for myelolipoma. Recent PE and DVT    RECOMMENDATIONS:  Records and labs and images were reviewed and discussed with the patient. Patient had recent DVT and PE and started on Eliquis. Currently he presents with abdominal symptoms and was found to have large retroperitoneal mass. Biopsy results explained. Myelolipoma. Explained management. Observation vs resection. We will refer to Dr. Anand Holder for his opinion.    Patient's questions were answered to the best of his satisfaction and he verbalized full understanding and agreement. Ashwini Orozco MD, MD                            [facility]                            This note is created with the assistance of a speech recognition program.  While intending to generate a document that actually reflects the content of the visit, the document can still have some errors including those of syntax and sound a like substitutions which may escape proof reading. It such instances, actual meaning can be extrapolated by contextual diversion.

## 2023-03-06 ENCOUNTER — TELEPHONE (OUTPATIENT)
Dept: INTERNAL MEDICINE CLINIC | Age: 53
End: 2023-03-06

## 2023-03-06 ENCOUNTER — TELEPHONE (OUTPATIENT)
Dept: ONCOLOGY | Age: 53
End: 2023-03-06

## 2023-03-06 NOTE — PROGRESS NOTES
[facility]  [address] [address]  Dept: [phone]                 Clinic History and Physical    Patient:  Blanka Alexandra  MRN: [id number]    CHIEF COMPLAINT:  had concerns including New Patient (Retroperitoneal mass, Myelolipoma). PCP: Alyssa Aldana MD  Referring Physician: Nash May MD     HISTORY OF PRESENT ILLNESS:   The patient is a 46 y.o. male with recent history of DVT/bilateral PE 2/14/2023 on Eliquis who presents to surgical oncology clinic for evaluation of retroperitoneal myelolipoma. His main symptoms are abdominal pain associated with night sweats, loss of appetite and weight loss of about 10 Ibs over the last 2 to 3 weeks. He denies nausea and vomiting. He underwent CT scan of the abdomen and pelvis with IV contrast on 2/20/2023 which demonstrated a left retroperitoneal mass measuring 7x7x11 cm and fatty liver. CT-guided biopsy demonstrated myelolipoma. He was also complaining of headache and underwent CT scan of the head which demonstrated no acute intracranial abnormalities. 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  Authorizing Provider   apixaban (ELIQUIS) 5 MG TABS tablet Take 1 tablet by mouth 2 times daily 2/22/23  Yes Jackelin Greenwood MD   guaiFENesin (MUCINEX) 600 MG extended release tablet Take 600 mg by mouth 2 times daily as needed for Congestion  Patient not taking: No sig reported    Historical Provider, MD       Allergies:  Seasonal    Social History:   TOBACCO:   reports that he has never smoked. He has never used smokeless tobacco.  ETOH:   reports that he does not currently use alcohol. OCCUPATION:      Family History:   No family history on file. REVIEW OF SYSTEMS:  As stated in the MA's note. Physical Exam:    Vitals: Ht 5' 10\" (1.778 m)   Wt 218 lb 6.4 oz (99.1 kg)   BMI 31.34 kg/m²   General appearance: alert, appears stated age and cooperative  Skin: Skin color, texture, turgor normal. No rashes or lesions  HEENT: Head: Normocephalic, no lesions, without obvious abnormality.   Neck: no adenopathy  Lungs: Breathing comfortably on room air  Heart: RRR  Abdomen: soft, non-tender; bowel sounds normal; no masses,  no organomegaly  Extremities: extremities normal, atraumatic, no cyanosis or edema  Neurologic: Mental status: Alert, oriented, thought content appropriate    CBC:   Lab Results   Component Value Date    WBC 4.7 02/23/2023    HGB 13.8 02/23/2023    HCT 41.1 02/23/2023    MCV 90.8 02/23/2023     02/23/2023     BMP:    Lab Results   Component Value Date/Time     02/23/2023 05:28 AM    K 4.0 02/23/2023 05:28 AM     02/23/2023 05:28 AM    CO2 25 02/23/2023 05:28 AM    BUN 8 02/23/2023 05:28 AM    CREATININE 0.62 02/23/2023 05:28 AM    GLUCOSE 112 02/23/2023 05:28 AM    CALCIUM 8.6 02/23/2023 05:28 AM      Hepatic:   Lab Results   Component Value Date/Time    ALKPHOS 164 02/20/2023 07:30 PM     02/20/2023 07:30 PM    AST 70 02/20/2023 07:30 PM    PROT 7.9 02/20/2023 07:30 PM    BILITOT 1.0 02/20/2023 07:30 PM    BILIDIR 0.3 02/20/2023 07:30 PM    LABALBU 4.0 02/20/2023 07:30 PM      Troponin: No results found for: CKTOTAL, CKMB, CKMBINDEX, TROPONINI   BNP: No results found for: BNP   INR:   Lab Results   Component Value Date    INR 1.0 02/22/2023    INR 1.1 02/20/2023    INR 1.1 02/15/2023    PROTIME 13.5 02/22/2023    PROTIME 14.0 02/20/2023    PROTIME 13.9 02/15/2023      Tumor markers: No results found for: , CEA      Endoscopy   N/A      Imaging     CT Result (most recent):  CT BIOPSY ABDOMEN RETROPERITONEUM 02/22/2023    Narrative  PROCEDURE:  CT GUIDED CORE BIOPSY left retroperitoneal mass    MODERATE CONSCIOUS SEDATION    2/22/2023    HISTORY:  ORDERING SYSTEM PROVIDED HISTORY: new left-sided retroperitoneal mass  TECHNOLOGIST PROVIDED HISTORY:  new left-sided retroperitoneal mass    PHYSICIANS:  Nicole Thompson MD    SEDATION:  Moderate sedation was ordered and supervised by the attending with physician  face-to-face monitoring. Medications were provided and recorded by Radiology  nurses. TECHNIQUE:  Informed consent was obtained after a detailed discussion about the procedure  including the risk, benefits, and alternatives. Universal protocol was  followed. Sterile gowns, masks, hats and gloves utilized for maximal sterile  barrier. Axial images were obtained through the left lower abdomen and a  suitable skin site was prepped and draped in sterile fashion. Local  anesthesia was achieved with lidocaine. Core biopsy was performed with CT  guidance. 3 18 gauge core biopsy specimens were obtained using coaxial  technique targeting the most solid/soft tissue component of the mass and the  patient tolerated the procedure well. Dose modulation, iterative reconstruction, and/or weight based adjustment of  the mA/kV was utilized to reduce the radiation dose to as low as reasonably  achievable. FINDINGS:  No immediate postprocedural complications    Impression  Successful CT guided core biopsy of the left retroperitoneal mass targeting  the most soft tissue component.       CT HEAD WO CONTRAST 02/21/2023    Narrative  EXAMINATION:  CT OF THE HEAD WITHOUT CONTRAST  2/21/2023 2:33 pm    TECHNIQUE:  CT of the head was performed without the administration of intravenous  contrast. Automated exposure control, iterative reconstruction, and/or weight  based adjustment of the mA/kV was utilized to reduce the radiation dose to as  low as reasonably achievable. COMPARISON:  None. HISTORY:  ORDERING SYSTEM PROVIDED HISTORY: Headache  TECHNOLOGIST PROVIDED HISTORY:  Headache    Reason for Exam: Headache    FINDINGS:  BRAIN/VENTRICLES: There is no acute intracranial hemorrhage, mass effect or  midline shift. No abnormal extra-axial fluid collection. The gray-white  differentiation is maintained without evidence of an acute infarct. There is  no evidence of hydrocephalus. ORBITS: The visualized portion of the orbits demonstrate no acute abnormality. SINUSES: The visualized paranasal sinuses and mastoid air cells demonstrate  no acute abnormality. SOFT TISSUES/SKULL:  No acute abnormality of the visualized skull or soft  tissues. Impression  No acute intracranial abnormality. MRI Result (most recent):  No results found for this or any previous visit from the past 3650 days. Pathology     Surgical Pathology Report   Date Value Ref Range Status   02/22/2023       -- Diagnosis --    Left retroperitoneal mass, core biopsy:    -  Myelolipoma. Cortez Fowler M.D.  **Electronically Signed Out**         rdd/2/23/2023       Clinical Information  Operative Findings:  L RETROPERITONEAL MASS BIOPSY   tm     Source of Specimen  A: LEFT RETROPERITONEAL MASS BX    Gross Description  \"DASH ALBERTS, L RETROPERITONEAL MASS\" Four tan-red needle core  biopsies from 0.3 to 1.5 cm in length and are 0.3 x 1.5 x 0.1 cm in  aggregate. Entirely 2cs. ep tm      Microscopic Description  Biopsy samples fatty neoplasm with interspersed maturing trilineage  hematopoiesis. Adipose is mature without atypia. Hematopoiesis  appears morphologically normal as well. Biopsy is additionally  characterized utilizing control appropriate immunostains.   CD34 stains  only rare cells within the hematopoietic compartment. CD61 shows  positive staining of megakaryocytes throughout. Pancytokeratin is  negative for evidence of epithelial metastatic process. The case is reviewed in intradepartmental pathology consultation with  consensus (SF, AG). SURGICAL PATHOLOGY CONSULTATION       Patient Name: Sharon Hoyos: [id number]  Path Number: [id number]    [facility] [address] 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 Then the patient was referred to surgical oncology clinic by Dr. Johnny Lerma. I have reviewed his CT scan and the mass is located in the left retroperitoneum with close proximity to the left ureter. There are bilateral inguinal hernia. Extra-adrenal myelolipoma was are rare, benign tumors composed of adipose tissue and hematopoietic cells. These were reported in less than 100 cases in the literature. The main symptoms revolve around mass effect and hemorrhage. Malignant degeneration is rare. There were reports of associated non-Hodgkin lymphoma in the literature. I would like to obtain MRI of the abdomen and pelvis with without contrast to evaluate the local staging of his retroperitoneal soft tissue mass and involvement of major blood vessels and the left ureter. I would like to move forward following that to schedule him for robotic possible open excision of the retroperitoneal mass due to his symptoms and the large size of the mass >7 cm.   I discussed with him the risk of left ureteral injury due to the proximity of the tumor and, therefore, I will ask urology to place left ureteral stent prior to the procedure. I will also asked urology to be available in case the tumor is invading the ureter and further resection with reconstruction is needed. I will place referral to Dr. Sweta Tinoco. He understand the risk of bleeding, infection, fluid collections requiring intervention, colonic injury, ureteral injury, heart failure, stroke, heart attack and anesthesia related risks. He is willing to proceed and consented for the procedure. I discussed with the patient that the bilateral inguinal hernia will not be addressed during this procedure. I would like to obtain EGD/colonoscopy and IVC filter placement prior to his travel to [facility] which will be in April. He should get the MRI and cardiology clearance after his return. I will see him back in the office following that to schedule surgery. 2.  Surveillance for colon cancer  He will require colonoscopy prior to surgery which I ordered. 3.  Surveillance for gastric mesenchymal tumor  The literature has reported the association of such neoplasms with gastric stromal tumors. Hence, I will ask GI to perform EGD to rule out gastric mesenchymal tumors. 3.  Deep venous thrombosis and pulmonary embolism  Due to the recent episode of pulmonary embolism and the need to hold anticoagulation for major abdominal surgery, I will ask Dr. Sadiq Daniels To place IVC filter. His most recent echo on 2/14/2023 demonstrated normal left ventricle with EF 55%, normal right ventricular function with trivial mitral regurgitation and trivial aortic insufficiency. There was no pulmonary hypertension. I will also asked Dr. Zuhair Flores from cardiology to clear him for surgical intervention.     Patient Active Problem List   Diagnosis Code    Pulmonary embolism without acute cor pulmonale, unspecified chronicity, unspecified pulmonary embolism type (HCC) I26.99    Acute deep vein thrombosis (DVT) of femoral vein of right lower extremity ([facility]) I82.411    Abdominal pain, left lower quadrant R10.32    Retroperitoneal mass R19.00    Myelolipoma D17.9       Oscar Locke was seen today for new patient. Diagnoses and all orders for this visit:    Retroperitoneal mass  -     MRI ABDOMEN W WO CONTRAST; Future  -     Brain Natriuretic Peptide; Future  -     CBC with Auto Differential; Future  -     Comprehensive Metabolic Panel; Future  -     Phosphorus; Future  -     Magnesium; Future  -     Prealbumin; Future  -     Protime-INR; Future  -     Urinalysis with Reflex to Culture; Future  -     EKG 12 Lead; Future  -     External Referral To Cardiology  -     COLONOSCOPY (Screening); Future  -     EGD Routine; Future  -     MRI PELVIS W WO CONTRAST; Future    Multiple subsegmental pulmonary emboli without acute cor pulmonale ([address]  -     IR GUIDED IVC FILTER PLACEMENT; Future    Acute deep vein thrombosis (DVT) of proximal vein of right lower extremity (HCC)       Nano Rodriguez MD [facility]  Surgical Oncology/HPB Surgery  [facility]  [id number]  [facility] [address]  Office:  [phone]  Fax:  [phone]  03/07/23   11:00 AM    This note is created with the assistance of a speech recognition program.  While intending to generate a document that actually reflects the content of the visit, the document can still have some errors including those of syntax and sound a like substitutions which may escape proof reading. It such instances, actual meaning can be extrapolated by contextual diversion.       CC: Melissa Nugent MD  CC: Charity Cantrell MD   CC: Alexis Blanco MD

## 2023-03-07 ENCOUNTER — INITIAL CONSULT (OUTPATIENT)
Dept: VASCULAR SURGERY | Age: 53
End: 2023-03-07
Payer: COMMERCIAL

## 2023-03-07 ENCOUNTER — TELEPHONE (OUTPATIENT)
Dept: GASTROENTEROLOGY | Age: 53
End: 2023-03-07

## 2023-03-07 ENCOUNTER — OFFICE VISIT (OUTPATIENT)
Dept: SURGERY | Age: 53
End: 2023-03-07
Payer: COMMERCIAL

## 2023-03-07 VITALS
BODY MASS INDEX: 31.26 KG/M2 | DIASTOLIC BLOOD PRESSURE: 82 MMHG | WEIGHT: 218.4 LBS | HEIGHT: 70 IN | HEART RATE: 86 BPM | SYSTOLIC BLOOD PRESSURE: 130 MMHG

## 2023-03-07 VITALS
DIASTOLIC BLOOD PRESSURE: 82 MMHG | HEIGHT: 70 IN | RESPIRATION RATE: 17 BRPM | HEART RATE: 86 BPM | OXYGEN SATURATION: 96 % | TEMPERATURE: 96.8 F | BODY MASS INDEX: 31.21 KG/M2 | WEIGHT: 218 LBS | SYSTOLIC BLOOD PRESSURE: 130 MMHG

## 2023-03-07 DIAGNOSIS — I26.94 MULTIPLE SUBSEGMENTAL PULMONARY EMBOLI WITHOUT ACUTE COR PULMONALE (HCC): ICD-10-CM

## 2023-03-07 DIAGNOSIS — R19.00 RETROPERITONEAL MASS: ICD-10-CM

## 2023-03-07 DIAGNOSIS — I82.4Y1 ACUTE DEEP VEIN THROMBOSIS (DVT) OF PROXIMAL VEIN OF RIGHT LOWER EXTREMITY (HCC): ICD-10-CM

## 2023-03-07 DIAGNOSIS — I26.99 OTHER ACUTE PULMONARY EMBOLISM WITHOUT ACUTE COR PULMONALE (HCC): Primary | ICD-10-CM

## 2023-03-07 DIAGNOSIS — R19.00 RETROPERITONEAL MASS: Primary | ICD-10-CM

## 2023-03-07 DIAGNOSIS — I82.431 ACUTE DEEP VEIN THROMBOSIS (DVT) OF POPLITEAL VEIN OF RIGHT LOWER EXTREMITY (HCC): ICD-10-CM

## 2023-03-07 PROCEDURE — 99204 OFFICE O/P NEW MOD 45 MIN: CPT | Performed by: SURGERY

## 2023-03-07 PROCEDURE — G8417 CALC BMI ABV UP PARAM F/U: HCPCS | Performed by: SURGERY

## 2023-03-07 PROCEDURE — G8427 DOCREV CUR MEDS BY ELIG CLIN: HCPCS | Performed by: SURGERY

## 2023-03-07 PROCEDURE — G8484 FLU IMMUNIZE NO ADMIN: HCPCS | Performed by: SURGERY

## 2023-03-07 PROCEDURE — 1111F DSCHRG MED/CURRENT MED MERGE: CPT | Performed by: SURGERY

## 2023-03-07 PROCEDURE — 3017F COLORECTAL CA SCREEN DOC REV: CPT | Performed by: SURGERY

## 2023-03-07 PROCEDURE — 99205 OFFICE O/P NEW HI 60 MIN: CPT | Performed by: SURGERY

## 2023-03-07 PROCEDURE — 1036F TOBACCO NON-USER: CPT | Performed by: SURGERY

## 2023-03-07 RX ORDER — BISACODYL 5 MG
TABLET, DELAYED RELEASE (ENTERIC COATED) ORAL
Qty: 4 TABLET | Refills: 0 | Status: SHIPPED | OUTPATIENT
Start: 2023-03-07

## 2023-03-07 RX ORDER — POLYETHYLENE GLYCOL 3350 17 G/17G
POWDER, FOR SOLUTION ORAL
Qty: 238 G | Refills: 0 | Status: SHIPPED | OUTPATIENT
Start: 2023-03-07

## 2023-03-07 ASSESSMENT — ENCOUNTER SYMPTOMS
WHEEZING: 0
ABDOMINAL DISTENTION: 0
DIARRHEA: 0
BLOOD IN STOOL: 0
BACK PAIN: 0
NAUSEA: 1
SHORTNESS OF BREATH: 1
VOMITING: 1
CHEST TIGHTNESS: 0
ABDOMINAL PAIN: 0
COUGH: 0
CONSTIPATION: 0

## 2023-03-07 NOTE — LETTER
March 7, 2023      [facility]  [address] [facility]      Patient: Maria Norman   MR Number: [id number]   YOB: 1970   Date of Visit: 3/7/2023       Dear Dr. Lynn Self:    Thank you for referring Brandi Kim to me for evaluation/treatment. Below are the relevant portions of my assessment and plan of care. If you have questions, please do not hesitate to call me. I look forward to following Baudilio Worrell along with you.     Sincerely,        Ramandeep Escobar MD    CC providers:  Georgina Bueno MD  [facility] [address]  [address] [facility] [address]

## 2023-03-07 NOTE — TELEPHONE ENCOUNTER
Procedure Scheduled/Jaya Franklin MD  EGD  Screening colonoscopy  3/21/23    10:15 am   [place]    Patient instructed on Miralax/Dulcolax bowel prep while in office. Dr. Glenna Rich spoke with Dr. Angeline Franklin regarding this patient. Received Eliquis clearance from Dr. Sadiq Daniels (in media) Patient instructed to stop Eliquis for 2 days prior to procedure and restart day after procedure.

## 2023-03-07 NOTE — LETTER
March 6, 2023      [address] [id number]      Patient: Valencia Deleon   MR Number: [id number]   YOB: 1970   Date of Visit: 3/7/2023       Dear Gregg Rubin:    Thank you for referring Senia Cochran to me for evaluation/treatment. Below are the relevant portions of my assessment and plan of care. If you have questions, please do not hesitate to call me. I look forward to following Mehdi Brady along with you.     Sincerely,        Elli Rolle MD

## 2023-03-07 NOTE — PROGRESS NOTES
Division of Vascular Surgery        New Consult      Physician Requesting Consult:  Dr. Grzegorz Saunders    Reason for Consult:   DVT, pulmonary embolism evaluation for preoperative retrievable IVC filter    Chief Complaint:      DVT/PE, retroperitoneal myelolipoma    History of Present Illness:      Alix Hodgkins is a 46 y.o. gentleman who was seen per request of surgical oncologist regarding his DVT and pulmonary embolism (2/14/23) likely from hypercoagulability due to recently diagnosis of large left sided retroperitoneal mass, biopsy revealing myelolipoma. He denies prior episodes of DVT/PE, or family history of this. He is fairly active, works as inspection and quality investigator at [employer]. He has plans for vacation in [facility] in April. He does not have significant pain in his calf or significant shortness of breath. He is currently on anticoagulation with eliquis. He does not wear compression stockings. He denies symptoms suggestive of claudication. Surgical Oncology is planning for colonoscopy at end of march for evaluation of any extension of his mass to colon. Followed by surgical resection of mass in May. Medical History:     Past Medical History:   Diagnosis Date    Deep vein thrombophlebitis of right leg ([address]     Pulmonary embolism [facility])        Surgical History:     Past Surgical History:   Procedure Laterality Date    CT BIOPSY ABDOMEN RETROPERITONEUM  02/22/2023    CT BIOPSY ABDOMEN RETROPERITONEUM 2/22/2023 [facility] CT SCAN    EYE SURGERY         Family History:     No family history on file. Allergies:       Seasonal    Medications:      Current Outpatient Medications   Medication Sig Dispense Refill    apixaban (ELIQUIS) 5 MG TABS tablet Take 1 tablet by mouth 2 times daily 60 tablet 3    guaiFENesin (MUCINEX) 600 MG extended release tablet Take 600 mg by mouth 2 times daily as needed for Congestion       No current facility-administered medications for this visit.      Social History:     Tobacco:    reports that he has never smoked. He has never used smokeless tobacco.  Alcohol:      reports that he does not currently use alcohol. Drug Use:  reports that he does not currently use drugs after having used the following drugs: Marijuana Katalina Agarwal). Occupation:  Works at [employer] as quality/inspection, always on his feet    Review of Systems:     Review of Systems   Constitutional:  Negative for chills and fever. HENT:  Negative for congestion. Eyes:  Negative for visual disturbance. Respiratory:  Negative for chest tightness and shortness of breath. Cardiovascular:  Negative for chest pain and leg swelling. Gastrointestinal:  Negative for abdominal pain. Endocrine: Negative. Genitourinary: Negative. Musculoskeletal: Negative. Skin:  Negative for color change and wound. Allergic/Immunologic: Negative. Neurological:  Negative for facial asymmetry, speech difficulty, weakness and numbness. Hematological: Negative. Psychiatric/Behavioral: Negative. Physical Exam:     Vitals:  /82 (Site: Left Upper Arm, Position: Sitting, Cuff Size: Medium Adult)   Pulse 86   Temp 96.8 °F (36 °C) (Temporal)   Resp 17   Ht 5' 10\" (1.778 m)   Wt 218 lb (98.9 kg)   SpO2 96%   BMI 31.28 kg/m²     Physical Exam  Constitutional:       Appearance: He is well-developed and well-groomed. Eyes:      Extraocular Movements: Extraocular movements intact. Cardiovascular:      Rate and Rhythm: Normal rate and regular rhythm. Pulses:           Radial pulses are 2+ on the right side and 2+ on the left side. Pulmonary:      Effort: Pulmonary effort is normal. No respiratory distress. Abdominal:      Palpations: Abdomen is soft. Tenderness: There is no abdominal tenderness. Musculoskeletal:      Cervical back: Full passive range of motion without pain. Right lower leg: Swelling (mild) present. No tenderness. No edema. Left lower leg:  No swelling or tenderness. No edema. Feet:      Right foot:      Skin integrity: No ulcer or skin breakdown. Left foot:      Skin integrity: No ulcer or skin breakdown. Skin:     General: Skin is warm. Capillary Refill: Capillary refill takes less than 2 seconds. Neurological:      Mental Status: He is alert and oriented to person, place, and time. GCS: GCS eye subscore is 4. GCS verbal subscore is 5. GCS motor subscore is 6. Sensory: Sensation is intact. Motor: Motor function is intact.    Psychiatric:         Mood and Affect: Mood normal.         Speech: Speech normal.         Behavior: Behavior normal.         Imaging/Labs:     CT from 2/14/23 reviewed reveals bilateral pulmonary embolism            Venous duplex (2/14/23) reviewed by me reveals right popliteal and tibial deep vein thrombosis      Assessment and Plan:     Right popliteal and tibial DVT, bilateral pulmonary embolism, left retroperitoneal mass myelolipoma  Overall lower extremity clot burden is low and would not need IVC filter before his trip to [facility]  Recommend compression stockings, ambulating frequently on the plane along with doing calf stretches while seated  Staying on anticoagulation will also help reduce risk of new DVT/PE  It will be safe for him to hold eliquis 1-2 days before colonoscopy and resume afterwards within 1 day, given that by that time DVT would of been over 1 month in age, unlikely that it will propagate or embolize  He is at high risk of recurrence of DVT and PE, especially if he needs to be off of anticoagulation for surgical resection of retroperitoneal mass and concern for bleeding  It would be reasonable to place retrievable IVC filter for short period during his intraoperative and postoperative period  My office will work with Dr. Tim Mcnally office to plan for placement of retrievable IVC filter 1 week prior to his anticipated surgery at [facility] cath lab  Risks of procedure have been discussed and will be explained again day of procedure  Prior to his surgical resection he will hold his anticoagulation 2 days prior and will discuss resumption when safe by Dr. Sarwat Martino  He should be given chemoprophylactic dosing day of surgery and postoperatively to help prevent recurrant or new DVT formation due to his hypercoagulable state     Electronically signed by Azucena London MD on 3/7/23 at 11:39 AM [address]  Office: [phone]  Cell: [phone]  Email: [email or web address]

## 2023-03-07 NOTE — PROGRESS NOTES
Review of Systems   Constitutional:  Negative for chills, fatigue, fever and unexpected weight change. Eyes:  Negative for visual disturbance. Respiratory:  Positive for shortness of breath. Negative for cough, chest tightness and wheezing. Cardiovascular:  Negative for chest pain, palpitations and leg swelling. Gastrointestinal:  Positive for nausea and vomiting. Negative for abdominal distention, abdominal pain, blood in stool, constipation and diarrhea. Genitourinary:  Negative for dysuria, hematuria and urgency. Musculoskeletal:  Negative for back pain. Skin:  Negative for rash. Neurological:  Positive for light-headedness and headaches. Negative for dizziness, seizures, syncope, speech difficulty, weakness and numbness. Hematological:  Negative for adenopathy. Does not bruise/bleed easily. Psychiatric/Behavioral:  The patient is nervous/anxious.

## 2023-03-09 PROBLEM — I82.411 ACUTE DEEP VEIN THROMBOSIS (DVT) OF FEMORAL VEIN OF RIGHT LOWER EXTREMITY (HCC): Status: RESOLVED | Noted: 2023-02-20 | Resolved: 2023-03-09

## 2023-03-09 PROBLEM — I26.99 PULMONARY EMBOLISM WITHOUT ACUTE COR PULMONALE, UNSPECIFIED CHRONICITY, UNSPECIFIED PULMONARY EMBOLISM TYPE (HCC): Status: RESOLVED | Noted: 2023-02-14 | Resolved: 2023-03-09

## 2023-03-09 PROBLEM — I82.431 ACUTE DEEP VEIN THROMBOSIS (DVT) OF POPLITEAL VEIN OF RIGHT LOWER EXTREMITY (HCC): Status: ACTIVE | Noted: 2023-03-09

## 2023-03-09 ASSESSMENT — ENCOUNTER SYMPTOMS
SHORTNESS OF BREATH: 0
ABDOMINAL PAIN: 0
COLOR CHANGE: 0
ALLERGIC/IMMUNOLOGIC NEGATIVE: 1
CHEST TIGHTNESS: 0

## 2023-03-14 NOTE — DISCHARGE INSTRUCTIONS
Normal changes you may experience after a EGD:  Activity   You have had anesthesia today  Do not drive, operate heavy equipment, consume alcoholic beverages, or make any important decisions  for 24 hours   Take your time changing positions today. You may feel light headed or dizzy if you move too quickly. Rest for the next 24 hours. Diet   You can eat your normal diet when you feel well. You should start off with bland foods like chicken soup, toast, or yogurt. Then advance as tolerated. Drink plenty of fluids (unless your doctor tells you not to). Your urine should be very lightly colored without a strong odor. Medicines   Continue your home medications as ordered by your physician.      Call your doctor now or seek immediate medical care if:[phone]  You are passing blood rectally or vomiting blood (color of blood may be red or black)  You have coffee ground looking vomit  Severe abdominal pain or tenderness    You have a fever, chills or excessive sweating   You have persistent nausea or vomiting   Redness or swelling at the IV site

## 2023-03-20 ENCOUNTER — ANESTHESIA EVENT (OUTPATIENT)
Dept: OPERATING ROOM | Age: 53
End: 2023-03-20
Payer: COMMERCIAL

## 2023-03-21 ENCOUNTER — HOSPITAL ENCOUNTER (OUTPATIENT)
Age: 53
Setting detail: OUTPATIENT SURGERY
Discharge: HOME | End: 2023-03-21
Attending: INTERNAL MEDICINE | Admitting: INTERNAL MEDICINE
Payer: COMMERCIAL

## 2023-03-21 ENCOUNTER — ANESTHESIA (OUTPATIENT)
Dept: OPERATING ROOM | Age: 53
End: 2023-03-21
Payer: COMMERCIAL

## 2023-03-21 VITALS
HEART RATE: 78 BPM | SYSTOLIC BLOOD PRESSURE: 119 MMHG | TEMPERATURE: 97.6 F | OXYGEN SATURATION: 96 % | DIASTOLIC BLOOD PRESSURE: 91 MMHG | HEIGHT: 70 IN | WEIGHT: 220.8 LBS | RESPIRATION RATE: 13 BRPM | BODY MASS INDEX: 31.61 KG/M2

## 2023-03-21 DIAGNOSIS — R19.00 RETROPERITONEAL MASS: ICD-10-CM

## 2023-03-21 DIAGNOSIS — Z12.11 SCREENING FOR COLON CANCER: ICD-10-CM

## 2023-03-21 PROCEDURE — 88305 TISSUE EXAM BY PATHOLOGIST: CPT

## 2023-03-21 PROCEDURE — 2709999900 HC NON-CHARGEABLE SUPPLY: Performed by: INTERNAL MEDICINE

## 2023-03-21 PROCEDURE — 2500000003 HC RX 250 WO HCPCS: Performed by: ANESTHESIOLOGY

## 2023-03-21 PROCEDURE — 3609012400 HC EGD TRANSORAL BIOPSY SINGLE/MULTIPLE: Performed by: INTERNAL MEDICINE

## 2023-03-21 PROCEDURE — 3700000001 HC ADD 15 MINUTES (ANESTHESIA): Performed by: INTERNAL MEDICINE

## 2023-03-21 PROCEDURE — 2580000003 HC RX 258: Performed by: ANESTHESIOLOGY

## 2023-03-21 PROCEDURE — 3609027000 HC COLONOSCOPY: Performed by: INTERNAL MEDICINE

## 2023-03-21 PROCEDURE — 7100000010 HC PHASE II RECOVERY - FIRST 15 MIN: Performed by: INTERNAL MEDICINE

## 2023-03-21 PROCEDURE — 88342 IMHCHEM/IMCYTCHM 1ST ANTB: CPT

## 2023-03-21 PROCEDURE — 3700000000 HC ANESTHESIA ATTENDED CARE: Performed by: INTERNAL MEDICINE

## 2023-03-21 PROCEDURE — G0121 COLON CA SCRN NOT HI RSK IND: HCPCS | Performed by: INTERNAL MEDICINE

## 2023-03-21 PROCEDURE — 6360000002 HC RX W HCPCS: Performed by: ANESTHESIOLOGY

## 2023-03-21 PROCEDURE — 43239 EGD BIOPSY SINGLE/MULTIPLE: CPT | Performed by: INTERNAL MEDICINE

## 2023-03-21 PROCEDURE — 7100000011 HC PHASE II RECOVERY - ADDTL 15 MIN: Performed by: INTERNAL MEDICINE

## 2023-03-21 RX ORDER — MORPHINE SULFATE 2 MG/ML
1 INJECTION, SOLUTION INTRAMUSCULAR; INTRAVENOUS EVERY 5 MIN PRN
Status: DISCONTINUED | OUTPATIENT
Start: 2023-03-21 | End: 2023-03-21 | Stop reason: HOSPADM

## 2023-03-21 RX ORDER — SODIUM CHLORIDE 0.9 % (FLUSH) 0.9 %
5-40 SYRINGE (ML) INJECTION EVERY 12 HOURS SCHEDULED
Status: DISCONTINUED | OUTPATIENT
Start: 2023-03-21 | End: 2023-03-21 | Stop reason: HOSPADM

## 2023-03-21 RX ORDER — SODIUM CHLORIDE 9 MG/ML
25 INJECTION, SOLUTION INTRAVENOUS PRN
Status: DISCONTINUED | OUTPATIENT
Start: 2023-03-21 | End: 2023-03-21 | Stop reason: HOSPADM

## 2023-03-21 RX ORDER — SODIUM CHLORIDE 0.9 % (FLUSH) 0.9 %
5-40 SYRINGE (ML) INJECTION PRN
Status: DISCONTINUED | OUTPATIENT
Start: 2023-03-21 | End: 2023-03-21 | Stop reason: HOSPADM

## 2023-03-21 RX ORDER — DIPHENHYDRAMINE HYDROCHLORIDE 50 MG/ML
12.5 INJECTION INTRAMUSCULAR; INTRAVENOUS
Status: DISCONTINUED | OUTPATIENT
Start: 2023-03-21 | End: 2023-03-21 | Stop reason: HOSPADM

## 2023-03-21 RX ORDER — PROPOFOL 10 MG/ML
INJECTION, EMULSION INTRAVENOUS PRN
Status: DISCONTINUED | OUTPATIENT
Start: 2023-03-21 | End: 2023-03-21 | Stop reason: SDUPTHER

## 2023-03-21 RX ORDER — SODIUM CHLORIDE 9 MG/ML
INJECTION, SOLUTION INTRAVENOUS PRN
Status: DISCONTINUED | OUTPATIENT
Start: 2023-03-21 | End: 2023-03-21 | Stop reason: HOSPADM

## 2023-03-21 RX ORDER — LIDOCAINE HYDROCHLORIDE 10 MG/ML
INJECTION, SOLUTION EPIDURAL; INFILTRATION; INTRACAUDAL; PERINEURAL PRN
Status: DISCONTINUED | OUTPATIENT
Start: 2023-03-21 | End: 2023-03-21 | Stop reason: SDUPTHER

## 2023-03-21 RX ORDER — HYDRALAZINE HYDROCHLORIDE 20 MG/ML
10 INJECTION INTRAMUSCULAR; INTRAVENOUS
Status: DISCONTINUED | OUTPATIENT
Start: 2023-03-21 | End: 2023-03-21 | Stop reason: HOSPADM

## 2023-03-21 RX ORDER — MEPERIDINE HYDROCHLORIDE 50 MG/ML
12.5 INJECTION INTRAMUSCULAR; INTRAVENOUS; SUBCUTANEOUS ONCE
Status: DISCONTINUED | OUTPATIENT
Start: 2023-03-21 | End: 2023-03-21 | Stop reason: HOSPADM

## 2023-03-21 RX ORDER — SODIUM CHLORIDE, SODIUM LACTATE, POTASSIUM CHLORIDE, CALCIUM CHLORIDE 600; 310; 30; 20 MG/100ML; MG/100ML; MG/100ML; MG/100ML
INJECTION, SOLUTION INTRAVENOUS CONTINUOUS PRN
Status: DISCONTINUED | OUTPATIENT
Start: 2023-03-21 | End: 2023-03-21 | Stop reason: SDUPTHER

## 2023-03-21 RX ORDER — METOCLOPRAMIDE HYDROCHLORIDE 5 MG/ML
10 INJECTION INTRAMUSCULAR; INTRAVENOUS
Status: DISCONTINUED | OUTPATIENT
Start: 2023-03-21 | End: 2023-03-21 | Stop reason: HOSPADM

## 2023-03-21 RX ORDER — SODIUM CHLORIDE, SODIUM LACTATE, POTASSIUM CHLORIDE, CALCIUM CHLORIDE 600; 310; 30; 20 MG/100ML; MG/100ML; MG/100ML; MG/100ML
INJECTION, SOLUTION INTRAVENOUS CONTINUOUS
Status: DISCONTINUED | OUTPATIENT
Start: 2023-03-21 | End: 2023-03-21 | Stop reason: HOSPADM

## 2023-03-21 RX ORDER — ONDANSETRON 2 MG/ML
4 INJECTION INTRAMUSCULAR; INTRAVENOUS
Status: DISCONTINUED | OUTPATIENT
Start: 2023-03-21 | End: 2023-03-21 | Stop reason: HOSPADM

## 2023-03-21 RX ORDER — OXYCODONE HYDROCHLORIDE 5 MG/1
10 TABLET ORAL PRN
Status: DISCONTINUED | OUTPATIENT
Start: 2023-03-21 | End: 2023-03-21 | Stop reason: HOSPADM

## 2023-03-21 RX ORDER — OXYCODONE HYDROCHLORIDE 5 MG/1
5 TABLET ORAL PRN
Status: DISCONTINUED | OUTPATIENT
Start: 2023-03-21 | End: 2023-03-21 | Stop reason: HOSPADM

## 2023-03-21 RX ORDER — LIDOCAINE HYDROCHLORIDE 10 MG/ML
1 INJECTION, SOLUTION INFILTRATION; PERINEURAL
Status: DISCONTINUED | OUTPATIENT
Start: 2023-03-21 | End: 2023-03-21 | Stop reason: HOSPADM

## 2023-03-21 RX ADMIN — SODIUM CHLORIDE, POTASSIUM CHLORIDE, SODIUM LACTATE AND CALCIUM CHLORIDE: 600; 310; 30; 20 INJECTION, SOLUTION INTRAVENOUS at 09:44

## 2023-03-21 RX ADMIN — PROPOFOL 40 MG: 10 INJECTION, EMULSION INTRAVENOUS at 10:03

## 2023-03-21 RX ADMIN — PROPOFOL 40 MG: 10 INJECTION, EMULSION INTRAVENOUS at 09:57

## 2023-03-21 RX ADMIN — PROPOFOL 40 MG: 10 INJECTION, EMULSION INTRAVENOUS at 10:09

## 2023-03-21 RX ADMIN — PROPOFOL 40 MG: 10 INJECTION, EMULSION INTRAVENOUS at 10:00

## 2023-03-21 RX ADMIN — PROPOFOL 40 MG: 10 INJECTION, EMULSION INTRAVENOUS at 10:06

## 2023-03-21 RX ADMIN — PROPOFOL 150 MG: 10 INJECTION, EMULSION INTRAVENOUS at 09:50

## 2023-03-21 RX ADMIN — SODIUM CHLORIDE, POTASSIUM CHLORIDE, SODIUM LACTATE AND CALCIUM CHLORIDE: 600; 310; 30; 20 INJECTION, SOLUTION INTRAVENOUS at 08:55

## 2023-03-21 RX ADMIN — LIDOCAINE HYDROCHLORIDE 40 MG: 10 INJECTION, SOLUTION EPIDURAL; INFILTRATION; INTRACAUDAL; PERINEURAL at 09:50

## 2023-03-21 RX ADMIN — PROPOFOL 50 MG: 10 INJECTION, EMULSION INTRAVENOUS at 09:52

## 2023-03-21 RX ADMIN — PROPOFOL 40 MG: 10 INJECTION, EMULSION INTRAVENOUS at 10:12

## 2023-03-21 RX ADMIN — PROPOFOL 30 MG: 10 INJECTION, EMULSION INTRAVENOUS at 09:54

## 2023-03-21 ASSESSMENT — PAIN - FUNCTIONAL ASSESSMENT: PAIN_FUNCTIONAL_ASSESSMENT: 0-10

## 2023-03-21 NOTE — ANESTHESIA PRE PROCEDURE
the last 72 hours. Coags:   Lab Results   Component Value Date/Time    PROTIME 13.5 02/22/2023 04:48 AM    INR 1.0 02/22/2023 04:48 AM    APTT 27.9 02/22/2023 12:27 PM       HCG (If Applicable): No results found for: PREGTESTUR, PREGSERUM, HCG, HCGQUANT     ABGs: No results found for: PHART, PO2ART, PTB3VYK, KKT5HMM, BEART, K4TYRJQG     Type & Screen (If Applicable):  No results found for: LABABO, LABRH    Drug/Infectious Status (If Applicable):  No results found for: HIV, HEPCAB    COVID-19 Screening (If Applicable):   Lab Results   Component Value Date/Time    COVID19 Not Detected 02/14/2023 08:09 AM           Anesthesia Evaluation  Patient summary reviewed and Nursing notes reviewed no history of anesthetic complications:   Airway: Mallampati: II  TM distance: >3 FB   Neck ROM: full  Mouth opening: > = 3 FB   Dental: normal exam         Pulmonary:Negative Pulmonary ROS and normal exam  breath sounds clear to auscultation                             Cardiovascular:Negative CV ROS            Rhythm: regular  Rate: normal                    Neuro/Psych:   Negative Neuro/Psych ROS              GI/Hepatic/Renal: Neg GI/Hepatic/Renal ROS  (+) bowel prep,           Endo/Other:                     Abdominal:       Abdomen: soft. Vascular:   + DVT, PE. Other Findings:           Anesthesia Plan      MAC     ASA 2             Anesthetic plan and risks discussed with patient. Plan discussed with CRNA.                     Rufina Lagunas MD   3/21/2023

## 2023-03-21 NOTE — TELEPHONE ENCOUNTER
----- Message from Yenni Cummins sent at 3/21/2023  4:19 PM EDT -----  Subject: Refill Request    QUESTIONS  Name of Medication? apixaban (ELIQUIS) 5 MG TABS tablet  Patient-reported dosage and instructions? 5mg/2 a day   How many days do you have left? 10  Preferred Pharmacy? [facility] [id number]  Pharmacy phone number (if available)? [phone]  ---------------------------------------------------------------------------  --------------  CALL BACK INFO  What is the best way for the office to contact you? OK to leave message on   voicemail  Preferred Call Back Phone Number? [phone]  ---------------------------------------------------------------------------  --------------  SCRIPT ANSWERS  Relationship to Patient?  Self

## 2023-03-21 NOTE — PROGRESS NOTES
When writer asked about  home for procedure, pt informed writer that his mom would be taking him home after procedure. Also stated that mom had thought she had a doctors appointment today, but that it is tomorrow. [address] updating mom, on pt's request that he wanted her to stay in the waiting room. At that time, pt's mom informed writer that she had an appointment scheduled for today at 1000 in [facility]. Writer informed family member that she would have to stay in building d/t it being a busy day & not having room to hold pt if there is a delay in her returning. Family member insisting on going to doctors appointment. Charge RN notified & informed that family would have to stay in building. [address] member updated, & inquiring if she could leave to go get food. Informed family member that she would have to be in the building prior to pt going back for facility. Verbalized an understanding.  used

## 2023-03-21 NOTE — ANESTHESIA POSTPROCEDURE EVALUATION
POST- ANESTHESIA EVALUATION       Pt Name: Aaron Springer  MRN: [id number]  YOB: 1970  Date of evaluation: 3/21/2023  Time:  11:19 AM      BP (!) 119/91   Pulse 78   Temp 97.6 °F (36.4 °C)   Resp 13   Ht 5' 10\" (1.778 m)   Wt 220 lb 12.8 oz (100.2 kg)   SpO2 96%   BMI 31.68 kg/m²      Consciousness Level  Awake  Cardiopulmonary Status  Stable  Pain Adequately Treated YES  Nausea / Vomiting  NO  Adequate Hydration  YES  Anesthesia Related Complications NONE      Electronically signed by Mena Sanabria MD on 3/21/2023 at 11:19 AM       Department of Anesthesiology  Postprocedure Note    Patient: Aaron Springer  MRN: [id number]  YOB: 1970  Date of evaluation: 3/21/2023      Procedure Summary     Date: 03/21/23 Room / Location: [place] [address] / [facility]    Anesthesia Start: 5680 Anesthesia Stop: 7151    Procedures:       EGD BIOPSY      COLORECTAL CANCER SCREENING, NOT HIGH RISK Diagnosis:       Retroperitoneal mass      Screening for colon cancer      (Retroperitoneal mass [R19.00])      (Screening for colon cancer [Z12.11])    Surgeons: Carlie Garner MD Responsible Provider: Zeyad Aburto MD    Anesthesia Type: MAC ASA Status: 2          Anesthesia Type: No value filed.     Padmini Phase I: Padmini Score: 10    Padmini Phase II: Padmini Score: 10      Anesthesia Post Evaluation

## 2023-03-21 NOTE — H&P
Procedure History and Physical    Pre-Procedural Diagnosis:  Pre-operative assessment, screening procedure, dyspepsia    Indications:  same    Procedure Planned: endoscopy and colonoscopy     History Obtained From:  patient    HISTORY OF PRESENT ILLNESS:       The patient is a 46 y.o. male who presents for the above procedure. Past Medical History:    Past Medical History:   Diagnosis Date    Deep vein thrombophlebitis of right leg (HCC)     Hx of blood clots     Pulmonary embolism (HCC)        Past Surgical History:    Past Surgical History:   Procedure Laterality Date    CT BIOPSY ABDOMEN RETROPERITONEUM  02/22/2023    CT BIOPSY ABDOMEN RETROPERITONEUM 2/22/2023 [facility] CT SCAN    EYE SURGERY         Medications:  Current Facility-Administered Medications   Medication Dose Route Frequency Provider Last Rate Last Admin    lidocaine 1 % injection 1 mL  1 mL IntraDERmal Once PRN Lawrence Toure MD        lactated ringers IV soln infusion   IntraVENous Continuous Lawrence Toure  mL/hr at 03/21/23 0855 New Bag at 03/21/23 0855    sodium chloride flush 0.9 % injection 5-40 mL  5-40 mL IntraVENous 2 times per day Lawrence Toure MD        sodium chloride flush 0.9 % injection 5-40 mL  5-40 mL IntraVENous PRN Lawrence Toure MD        0.9 % sodium chloride infusion   IntraVENous PRN Lawrence Toure MD           Allergies: Allergies   Allergen Reactions    Seasonal                  Social   Social History     Tobacco Use    Smoking status: Never    Smokeless tobacco: Never   Substance Use Topics    Alcohol use: Not Currently        PSYCH HISTORY:  Depression No  Anxiety No  Suicide No       History reviewed. No pertinent family history. No family history of colon cancer, Crohn's disease, or ulcerative colitis    Problems with Sedation/Anesthesia in the past? no    REVIEW OF SYSTEMS:  12 point review of systems negative other than mentioned above.       PHYSICAL EXAM:    Vitals:  BP (!) 139/94   Pulse 87   Temp 97 °F (36.1 °C) (Infrared)   Resp 16   Ht 5' 10\" (1.778 m)   Wt 220 lb 12.8 oz (100.2 kg)   SpO2 97%   BMI 31.68 kg/m²     Focused Exam related to procedure:    General appearance: NAD, conversant   Eyes: anicteric sclerae, moist conjunctivae; no lid-lag; PERRLA   Lungs: CTA, with normal respiratory effort and no intercostal retractions   CV: RRR, no MRGs   Abdomen: Soft, non-tender; no masses or HSM   Skin: Normal temperature, turgor and texture; no rash, ulcers or subcutaneous nodules     DATA:  CBC:   Lab Results   Component Value Date    WBC 4.7 02/23/2023    HGB 13.8 02/23/2023    HCT 41.1 02/23/2023    MCV 90.8 02/23/2023     02/23/2023     BUN/Cr:   Lab Results   Component Value Date    BUN 8 02/23/2023   ,   Lab Results   Component Value Date    CREATININE 0.62 (L) 02/23/2023     Potassium:   Lab Results   Component Value Date    K 4.0 02/23/2023     PT/INR:   Lab Results   Component Value Date    INR 1.0 02/22/2023    INR 1.1 02/20/2023    INR 1.1 02/15/2023    PROTIME 13.5 02/22/2023    PROTIME 14.0 02/20/2023    PROTIME 13.9 02/15/2023       ASSESSMENT AND PLAN:       1. Patient is a 46 y.o. male with above specified procedure planned. Expected Sedation/Anesthesia Type: MAC    2. ASA ([address] Anesthesiology) Anesthesia Status: Class 2 - A normal healthy patient with mild systemic disease    3. Mallampati: II (soft palate, uvula, fauces visible)  4. Procedure options, risks and benefits reviewed with Patient. Patient expresses understanding.     5.  Consent has been signed:  Yes    Arin Yuen MD

## 2023-03-22 LAB — SURGICAL PATHOLOGY REPORT: NORMAL

## 2023-03-27 ENCOUNTER — TELEPHONE (OUTPATIENT)
Dept: INTERNAL MEDICINE CLINIC | Age: 53
End: 2023-03-27

## 2023-03-27 NOTE — TELEPHONE ENCOUNTER
----- Message from Aaliyah Loving sent at 3/27/2023 11:49 AM EDT -----  Subject: Message to Provider    QUESTIONS  Information for Provider? pt says that he is having a tooth abstracted on   2023 with [facility] surgeons with DR. Timur Ramírez. [facility] and would like to know if he needs to not take his blood   thinners that day.   ---------------------------------------------------------------------------  --------------  Marlon FAIRBANKS  [phone]; OK to leave message on voicemail  ---------------------------------------------------------------------------  --------------  SCRIPT ANSWERS  Relationship to Patient?  Self

## 2023-03-28 NOTE — OP NOTE
benefits, and reasonable alternatives including postponing the procedure were discussed. The patient DOES wish to proceed with the procedure at this time.

## 2023-04-06 ENCOUNTER — OFFICE VISIT (OUTPATIENT)
Dept: INTERNAL MEDICINE CLINIC | Age: 53
End: 2023-04-06
Payer: COMMERCIAL

## 2023-04-06 VITALS
OXYGEN SATURATION: 95 % | DIASTOLIC BLOOD PRESSURE: 70 MMHG | HEART RATE: 84 BPM | WEIGHT: 223 LBS | SYSTOLIC BLOOD PRESSURE: 120 MMHG | BODY MASS INDEX: 32 KG/M2

## 2023-04-06 DIAGNOSIS — D17.9: ICD-10-CM

## 2023-04-06 DIAGNOSIS — I26.99 PULMONARY EMBOLISM WITHOUT ACUTE COR PULMONALE, UNSPECIFIED CHRONICITY, UNSPECIFIED PULMONARY EMBOLISM TYPE (HCC): ICD-10-CM

## 2023-04-06 DIAGNOSIS — I82.4Y1 ACUTE DEEP VEIN THROMBOSIS (DVT) OF PROXIMAL VEIN OF RIGHT LOWER EXTREMITY (HCC): Primary | ICD-10-CM

## 2023-04-06 PROCEDURE — G8417 CALC BMI ABV UP PARAM F/U: HCPCS | Performed by: INTERNAL MEDICINE

## 2023-04-06 PROCEDURE — G8427 DOCREV CUR MEDS BY ELIG CLIN: HCPCS | Performed by: INTERNAL MEDICINE

## 2023-04-06 PROCEDURE — 1036F TOBACCO NON-USER: CPT | Performed by: INTERNAL MEDICINE

## 2023-04-06 PROCEDURE — 3017F COLORECTAL CA SCREEN DOC REV: CPT | Performed by: INTERNAL MEDICINE

## 2023-04-06 PROCEDURE — 99213 OFFICE O/P EST LOW 20 MIN: CPT | Performed by: INTERNAL MEDICINE

## 2023-04-06 ASSESSMENT — ENCOUNTER SYMPTOMS: SHORTNESS OF BREATH: 1

## 2023-04-06 NOTE — PROGRESS NOTES
Visit Information    Have you changed or started any medications since your last visit including any over-the-counter medicines, vitamins, or herbal medicines? no   Are you having any side effects from any of your medications? -  no  Have you stopped taking any of your medications? Is so, why? -  no    Have you seen any other physician or provider since your last visit? Yes - Records Obtained  Have you had any other diagnostic tests since your last visit? Yes - Records Obtained  Have you been seen in the emergency room and/or had an admission to a hospital since we last saw you? No  Have you had your routine dental cleaning in the past 6 months? no    Have you activated your [facility] account? If not, what are your barriers?  Yes     Patient Care Team:  Isabelle Owen MD as PCP - General (Internal Medicine)  Isabelle Owen MD as PCP - Empaneled Provider  Roseline Kelley MD as Consulting Physician (General Surgery)    Medical History Review  Past Medical, Family, and Social History reviewed and does contribute to the patient presenting condition    Health Maintenance   Topic Date Due    HIV screen  Never done    Hepatitis C screen  Never done    DTaP/Tdap/Td vaccine (1 - Tdap) Never done    Diabetes screen  Never done    Lipids  Never done    Shingles vaccine (1 of 2) Never done    Flu vaccine (Season Ended) 08/01/2023    Depression Screen  02/16/2024    Colorectal Cancer Screen  03/21/2033    COVID-19 Vaccine  Completed    Hepatitis A vaccine  Aged Out    Hib vaccine  Aged Out    Meningococcal (ACWY) vaccine  Aged Out    Pneumococcal 0-64 years Vaccine  Aged Out
Screen  03/21/2033    COVID-19 Vaccine  Completed    Hepatitis A vaccine  Aged Out    Hib vaccine  Aged Out    Meningococcal (ACWY) vaccine  Aged Out    Pneumococcal 0-64 years Vaccine  Aged Out       Subjective:      Review of Systems   Respiratory:  Positive for shortness of breath. All other systems reviewed and are negative. Objective:     Physical Exam  Vitals reviewed. Constitutional:       Appearance: He is well-developed. HENT:      Head: Normocephalic and atraumatic. Eyes:      Conjunctiva/sclera: Conjunctivae normal.      Pupils: Pupils are equal, round, and reactive to light. Neck:      Thyroid: No thyromegaly. Vascular: No JVD. Cardiovascular:      Rate and Rhythm: Normal rate and regular rhythm. Heart sounds: Normal heart sounds. No murmur heard. Pulmonary:      Effort: Pulmonary effort is normal.      Breath sounds: Normal breath sounds. Abdominal:      General: Bowel sounds are normal.      Palpations: Abdomen is soft. Musculoskeletal:         General: Normal range of motion. Cervical back: Normal range of motion and neck supple. Skin:     General: Skin is warm and dry. Neurological:      Mental Status: He is alert and oriented to person, place, and time. Deep Tendon Reflexes: Reflexes are normal and symmetric. /70 (Site: Right Upper Arm, Position: Sitting)   Pulse 84   Wt 223 lb (101.2 kg)   SpO2 95%   BMI 32.00 kg/m²       Assessment:       Diagnosis Orders   1. Acute deep vein thrombosis (DVT) of proximal vein of right lower extremity (HCC)        2. Pulmonary embolism without acute cor pulmonale, unspecified chronicity, unspecified pulmonary embolism type ([facility] [address]        3. Myxolipoma            Plan:      Return in about 3 months (around 7/6/2023). No orders of the defined types were placed in this encounter. No orders of the defined types were placed in this encounter.     The current medical regimen is effective;  continue present

## 2023-05-03 ENCOUNTER — HOSPITAL ENCOUNTER (OUTPATIENT)
Dept: MRI IMAGING | Age: 53
Discharge: HOME | End: 2023-05-05
Payer: COMMERCIAL

## 2023-05-03 ENCOUNTER — TELEPHONE (OUTPATIENT)
Dept: SURGERY | Age: 53
End: 2023-05-03

## 2023-05-03 DIAGNOSIS — R19.00 RETROPERITONEAL MASS: ICD-10-CM

## 2023-05-03 DIAGNOSIS — R19.00 RETROPERITONEAL MASS: Primary | ICD-10-CM

## 2023-05-03 PROCEDURE — 6360000004 HC RX CONTRAST MEDICATION: Performed by: SURGERY

## 2023-05-03 PROCEDURE — 2580000003 HC RX 258: Performed by: SURGERY

## 2023-05-03 PROCEDURE — A9579 GAD-BASE MR CONTRAST NOS,1ML: HCPCS | Performed by: SURGERY

## 2023-05-03 RX ORDER — SODIUM CHLORIDE 0.9 % (FLUSH) 0.9 %
10 SYRINGE (ML) INJECTION ONCE
Status: DISCONTINUED | OUTPATIENT
Start: 2023-05-03 | End: 2023-05-06 | Stop reason: HOSPADM

## 2023-05-03 RX ORDER — ALPRAZOLAM 0.5 MG/1
0.5 TABLET ORAL DAILY PRN
Qty: 2 TABLET | Refills: 0 | Status: SHIPPED | OUTPATIENT
Start: 2023-05-03 | End: 2023-05-16

## 2023-05-03 NOTE — TELEPHONE ENCOUNTER
Patient called and informed writer that he attempted MRI and was unable to complete due to being uncomfortable. Patient requests to try medication so that he can do the 5/13/23 MRI. Patient denied taking anti-anxiety medication before. Informed patient that we can try Xanax and instructed to take 1 hour prior to MRI and to have a  take him to MRI's. Informed patient that he should not drive for at least 12 hours after taking medication due to sedating effects. Pharmacy confirmed. Patient voiced understanding and appreciation. Writer rescheduled ABD MRI for 5/15/23 @ 6997. Patient called and informed of reschedule.

## 2023-05-11 ENCOUNTER — HOSPITAL ENCOUNTER (OUTPATIENT)
Dept: MRI IMAGING | Age: 53
Discharge: HOME | End: 2023-05-13
Payer: COMMERCIAL

## 2023-05-11 ENCOUNTER — TELEPHONE (OUTPATIENT)
Dept: SURGERY | Age: 53
End: 2023-05-11

## 2023-05-11 PROCEDURE — 2580000003 HC RX 258: Performed by: SURGERY

## 2023-05-11 PROCEDURE — 6360000004 HC RX CONTRAST MEDICATION: Performed by: SURGERY

## 2023-05-11 PROCEDURE — A9579 GAD-BASE MR CONTRAST NOS,1ML: HCPCS | Performed by: SURGERY

## 2023-05-11 RX ORDER — 0.9 % SODIUM CHLORIDE 0.9 %
40 INTRAVENOUS SOLUTION INTRAVENOUS ONCE
Status: DISCONTINUED | OUTPATIENT
Start: 2023-05-11 | End: 2023-05-14 | Stop reason: HOSPADM

## 2023-05-11 RX ORDER — SODIUM CHLORIDE 0.9 % (FLUSH) 0.9 %
10 SYRINGE (ML) INJECTION ONCE
Status: DISCONTINUED | OUTPATIENT
Start: 2023-05-11 | End: 2023-05-14 | Stop reason: HOSPADM

## 2023-05-11 NOTE — TELEPHONE ENCOUNTER
Spoke with MRI tech, was informed that patient unable to complete MRI even with anti-anxiety medication. Will clarify with surgeon that scans can be completed under anesthesia due to claustrophobia.

## 2023-05-12 ENCOUNTER — TELEPHONE (OUTPATIENT)
Dept: SURGERY | Age: 53
End: 2023-05-12

## 2023-05-12 DIAGNOSIS — R19.00 RETROPERITONEAL MASS: Primary | ICD-10-CM

## 2023-05-12 NOTE — TELEPHONE ENCOUNTER
Patient requires MRI under anesthesia. MRI's scheduled. Patient called and informed of date/time/location/instructions. Patient voiced understanding and appreciation.

## 2023-05-18 ENCOUNTER — TELEPHONE (OUTPATIENT)
Dept: SURGERY | Age: 53
End: 2023-05-18

## 2023-06-16 ENCOUNTER — HOSPITAL ENCOUNTER (OUTPATIENT)
Dept: MRI IMAGING | Age: 53
Discharge: HOME | End: 2023-06-18
Payer: COMMERCIAL

## 2023-06-16 VITALS
DIASTOLIC BLOOD PRESSURE: 87 MMHG | SYSTOLIC BLOOD PRESSURE: 116 MMHG | RESPIRATION RATE: 19 BRPM | HEART RATE: 89 BPM | OXYGEN SATURATION: 93 % | TEMPERATURE: 97.3 F

## 2023-06-16 DIAGNOSIS — R19.00 RETROPERITONEAL MASS: ICD-10-CM

## 2023-06-16 PROCEDURE — 6360000004 HC RX CONTRAST MEDICATION: Performed by: SURGERY

## 2023-06-16 PROCEDURE — 2580000003 HC RX 258: Performed by: SURGERY

## 2023-06-16 PROCEDURE — 7100000010 HC PHASE II RECOVERY - FIRST 15 MIN

## 2023-06-16 PROCEDURE — 3700000000 HC ANESTHESIA ATTENDED CARE

## 2023-06-16 PROCEDURE — 7100000001 HC PACU RECOVERY - ADDTL 15 MIN

## 2023-06-16 PROCEDURE — 7100000000 HC PACU RECOVERY - FIRST 15 MIN

## 2023-06-16 PROCEDURE — 3700000001 HC ADD 15 MINUTES (ANESTHESIA)

## 2023-06-16 PROCEDURE — 72197 MRI PELVIS W/O & W/DYE: CPT

## 2023-06-16 PROCEDURE — A9579 GAD-BASE MR CONTRAST NOS,1ML: HCPCS | Performed by: SURGERY

## 2023-06-16 RX ORDER — DIPHENHYDRAMINE HYDROCHLORIDE 50 MG/ML
12.5 INJECTION INTRAMUSCULAR; INTRAVENOUS
OUTPATIENT
Start: 2023-06-16 | End: 2023-06-17

## 2023-06-16 RX ORDER — 0.9 % SODIUM CHLORIDE 0.9 %
50 INTRAVENOUS SOLUTION INTRAVENOUS ONCE
Status: COMPLETED | OUTPATIENT
Start: 2023-06-16 | End: 2023-06-16

## 2023-06-16 RX ORDER — METOCLOPRAMIDE HYDROCHLORIDE 5 MG/ML
10 INJECTION INTRAMUSCULAR; INTRAVENOUS
OUTPATIENT
Start: 2023-06-16 | End: 2023-06-17

## 2023-06-16 RX ORDER — HYDRALAZINE HYDROCHLORIDE 20 MG/ML
10 INJECTION INTRAMUSCULAR; INTRAVENOUS
OUTPATIENT
Start: 2023-06-16

## 2023-06-16 RX ORDER — DROPERIDOL 2.5 MG/ML
0.62 INJECTION, SOLUTION INTRAMUSCULAR; INTRAVENOUS
OUTPATIENT
Start: 2023-06-16 | End: 2023-06-17

## 2023-06-16 RX ORDER — MEPERIDINE HYDROCHLORIDE 50 MG/ML
12.5 INJECTION INTRAMUSCULAR; INTRAVENOUS; SUBCUTANEOUS EVERY 5 MIN PRN
OUTPATIENT
Start: 2023-06-16

## 2023-06-16 RX ORDER — SODIUM CHLORIDE 0.9 % (FLUSH) 0.9 %
10 SYRINGE (ML) INJECTION PRN
Status: DISCONTINUED | OUTPATIENT
Start: 2023-06-16 | End: 2023-06-19 | Stop reason: HOSPADM

## 2023-06-16 RX ORDER — SODIUM CHLORIDE 0.9 % (FLUSH) 0.9 %
5-40 SYRINGE (ML) INJECTION EVERY 12 HOURS SCHEDULED
OUTPATIENT
Start: 2023-06-16

## 2023-06-16 RX ORDER — SODIUM CHLORIDE 0.9 % (FLUSH) 0.9 %
5-40 SYRINGE (ML) INJECTION PRN
OUTPATIENT
Start: 2023-06-16

## 2023-06-16 RX ORDER — SODIUM CHLORIDE 9 MG/ML
INJECTION, SOLUTION INTRAVENOUS PRN
OUTPATIENT
Start: 2023-06-16

## 2023-06-16 RX ADMIN — SODIUM CHLORIDE 50 ML: 9 INJECTION, SOLUTION INTRAVENOUS at 11:15

## 2023-06-16 RX ADMIN — GADOTERIDOL 20 ML: 279.3 INJECTION, SOLUTION INTRAVENOUS at 11:11

## 2023-06-16 NOTE — H&P
his mother; Cancer in his father; High Blood Pressure in his mother. Review of Systems:  CONSTITUTIONAL:   negative for fevers, chills, fatigue and malaise    EYES:   negative for double vision, blurred vision and photophobia +strabismus    HEENT:   negative for tinnitus, epistaxis and sore throat    RESPIRATORY:   negative for cough, shortness of breath, wheezing     CARDIOVASCULAR:   negative for chest pain, palpitations, syncope, edema     GASTROINTESTINAL:   negative for nausea, vomiting  +see HPI   GENITOURINARY:   negative for incontinence     MUSCULOSKELETAL:   negative for neck or back pain     NEUROLOGICAL:   Negative for weakness and tingling  negative for headaches and dizziness     PSYCHIATRIC:   negative for anxiety       OBJECTIVE:   VITALS: Ht 5'10\", wt: 220 lb T: 96.8, BP: 125/85, P: 79, R: 22, Sp 02 95%  CONSTITUTIONAL:alert & oriented x 3, no acute distress. Very friendly and talkative. SKIN:  Warm and dry, no rashes on exposed areas of skin   HEAD:  Normocephalic, atraumatic   EYES: PERRL. EOMs intact, strabismus  EARS:  Equal bilaterally, no edema or thickening, skin is intact without lumps or lesions. No discharge. Hearing grossly WNL. NOSE:  Nares patent. No rhinorrhea  MOUTH/THROAT:  Mucous membranes moist, tongue is pink, uvula midline, teeth appear to be intact  NECK:Full ROM  LUNGS: Respirations even and non-labored. Clear to auscultation bilaterally, no wheezes, rales, or rhonchi. CARDIOVASCULAR: Regular rate and rhythm, no murmurs/rubs/gallops   ABDOMEN: soft, non-tender, non-distended, bowel sounds active x 4, rotund  EXTREMITIES: No edema bilateral lower extremities. No varicosities bilateral lower extremities. NEUROLOGIC: CN II-XII are grossly intact. Gait not assessed.   IMPRESSIONS:   Retroperitoneal mass  Local staging of retroperitoneal mass    PLANS:   MRI abdomen and pelvis with anesthesia    AUSTIN BARNETT CNP   Electronically signed 6/16/2023 at 9:45 AM

## 2023-06-19 ENCOUNTER — TELEPHONE (OUTPATIENT)
Dept: SURGERY | Age: 53
End: 2023-06-19

## 2023-06-22 ENCOUNTER — TELEPHONE (OUTPATIENT)
Dept: SURGERY | Age: 53
End: 2023-06-22

## 2023-06-22 DIAGNOSIS — R19.00 RETROPERITONEAL MASS: Primary | ICD-10-CM

## 2023-06-22 NOTE — TELEPHONE ENCOUNTER
Called to remind patient that pre-op labs and EKG needs to be completed. Writer recommends patient come to [facility] early to get testing done. Patient voiced understanding and appreciation.

## 2023-06-22 NOTE — TELEPHONE ENCOUNTER
Called and spoke with Dr. Sterling Zee office at this time. Explained to office that Dr. Michelle Watts may need to see patient in office prior to surgery planned for 7/5/23 due to possibility of ureter resection and reconstruction due to retroperitoneal mass.    transferred me to triage line, no answer, left detailed VM and sent urgent referral.

## 2023-06-23 ENCOUNTER — TELEPHONE (OUTPATIENT)
Dept: UROLOGY | Age: 53
End: 2023-06-23

## 2023-06-23 ENCOUNTER — TELEPHONE (OUTPATIENT)
Dept: VASCULAR SURGERY | Age: 53
End: 2023-06-23

## 2023-06-23 NOTE — TELEPHONE ENCOUNTER
Spoke with Stefano Malone at Dr. Jorge Cagle office. Dr. Linh Moura would like to see patient in regards to possible resection and reconstruction of ureter  during retroperitoneal mass excision. Dr. Linh Moura to see patient on 7/6 to determine probability of resection and recon, and will coordinate scheduling surgery depending on possible surgical needs.

## 2023-06-23 NOTE — TELEPHONE ENCOUNTER
Writer received a call from [facility] in regards to  wanting to combine cases in the OR.  See previous NOTE from [facility] 6/22

## 2023-06-23 NOTE — TELEPHONE ENCOUNTER
Called and spoke to patient yesterday - scheduled for IVC Filter for Monday 6/26/2023 8:50am - arrive 7:45 am.  Patient is aware of arrival time / NPO /  / stop Eliquis night before surgery. Patient verbalized understanding. Consent to be signed day of surgery.

## 2023-06-23 NOTE — TELEPHONE ENCOUNTER
Spoke with Odette at Dr. Mandeep Grigsby office. Writer explained reasoning for referral.  Jayce Walsh to contact Dr. Delores Rivas to determine if patient needs to be seen prior to surgery.

## 2023-06-26 ENCOUNTER — ANESTHESIA (OUTPATIENT)
Dept: OPERATING ROOM | Age: 53
End: 2023-06-26
Payer: COMMERCIAL

## 2023-06-26 ENCOUNTER — HOSPITAL ENCOUNTER (OUTPATIENT)
Age: 53
Setting detail: OUTPATIENT SURGERY
Discharge: HOME | End: 2023-06-26
Attending: SURGERY
Payer: COMMERCIAL

## 2023-06-26 ENCOUNTER — ANESTHESIA EVENT (OUTPATIENT)
Dept: OPERATING ROOM | Age: 53
End: 2023-06-26
Payer: COMMERCIAL

## 2023-06-26 VITALS
SYSTOLIC BLOOD PRESSURE: 128 MMHG | HEART RATE: 65 BPM | OXYGEN SATURATION: 92 % | DIASTOLIC BLOOD PRESSURE: 96 MMHG | TEMPERATURE: 98.4 F | BODY MASS INDEX: 33.64 KG/M2 | HEIGHT: 70 IN | WEIGHT: 235 LBS

## 2023-06-26 LAB
BUN BLD-MCNC: 15 MG/DL (ref 8–26)
CHLORIDE BLD-SCNC: 108 MMOL/L (ref 98–107)
EGFR, POC: >60 ML/MIN/1.73M2
GLUCOSE BLD-MCNC: 103 MG/DL (ref 74–100)
HCT VFR BLD AUTO: 48 % (ref 41–53)
POC CREATININE: 0.65 MG/DL (ref 0.51–1.19)
POC HEMOGLOBIN: 16.2 G/DL (ref 13.5–17.5)
POTASSIUM BLD-SCNC: 5.4 MMOL/L (ref 3.5–4.5)
POTASSIUM SERPL-SCNC: 4.1 MMOL/L (ref 3.7–5.3)
SODIUM BLD-SCNC: 141 MMOL/L (ref 138–146)

## 2023-06-26 PROCEDURE — 82565 ASSAY OF CREATININE: CPT

## 2023-06-26 PROCEDURE — 85014 HEMATOCRIT: CPT

## 2023-06-26 PROCEDURE — 99153 MOD SED SAME PHYS/QHP EA: CPT | Performed by: SURGERY

## 2023-06-26 PROCEDURE — 84295 ASSAY OF SERUM SODIUM: CPT

## 2023-06-26 PROCEDURE — A4217 STERILE WATER/SALINE, 500 ML: HCPCS | Performed by: SURGERY

## 2023-06-26 PROCEDURE — 6360000002 HC RX W HCPCS: Performed by: SURGERY

## 2023-06-26 PROCEDURE — 82435 ASSAY OF BLOOD CHLORIDE: CPT

## 2023-06-26 PROCEDURE — 82947 ASSAY GLUCOSE BLOOD QUANT: CPT

## 2023-06-26 PROCEDURE — 7100000011 HC PHASE II RECOVERY - ADDTL 15 MIN

## 2023-06-26 PROCEDURE — 2709999900 HC NON-CHARGEABLE SUPPLY: Performed by: SURGERY

## 2023-06-26 PROCEDURE — 84520 ASSAY OF UREA NITROGEN: CPT

## 2023-06-26 PROCEDURE — 99152 MOD SED SAME PHYS/QHP 5/>YRS: CPT | Performed by: SURGERY

## 2023-06-26 PROCEDURE — 6360000004 HC RX CONTRAST MEDICATION: Performed by: SURGERY

## 2023-06-26 PROCEDURE — 7100000010 HC PHASE II RECOVERY - FIRST 15 MIN

## 2023-06-26 PROCEDURE — 3600000012 HC SURGERY LEVEL 2 ADDTL 15MIN: Performed by: SURGERY

## 2023-06-26 PROCEDURE — 37191 INS ENDOVAS VENA CAVA FILTR: CPT | Performed by: SURGERY

## 2023-06-26 PROCEDURE — 84132 ASSAY OF SERUM POTASSIUM: CPT

## 2023-06-26 PROCEDURE — 2500000003 HC RX 250 WO HCPCS: Performed by: SURGERY

## 2023-06-26 PROCEDURE — 2580000003 HC RX 258: Performed by: SURGERY

## 2023-06-26 PROCEDURE — C1880 VENA CAVA FILTER: HCPCS | Performed by: SURGERY

## 2023-06-26 PROCEDURE — 3600000002 HC SURGERY LEVEL 2 BASE: Performed by: SURGERY

## 2023-06-26 DEVICE — OPTION ELITE RETRIEVABLE VENA CAVA FILTER SUITABLE FOR JUGULAR OR FEMORAL DELIVERY
Type: IMPLANTABLE DEVICE | Site: GROIN | Status: FUNCTIONAL
Brand: OPTION ELITE RETRIEVABLE VENA CAVA FILTER SYSTEM

## 2023-06-26 RX ORDER — MIDAZOLAM HYDROCHLORIDE 1 MG/ML
INJECTION INTRAMUSCULAR; INTRAVENOUS
Status: DISCONTINUED
Start: 2023-06-26 | End: 2023-06-26 | Stop reason: HOSPADM

## 2023-06-26 RX ORDER — LIDOCAINE HYDROCHLORIDE 10 MG/ML
INJECTION, SOLUTION EPIDURAL; INFILTRATION; INTRACAUDAL; PERINEURAL PRN
Status: DISCONTINUED | OUTPATIENT
Start: 2023-06-26 | End: 2023-06-26 | Stop reason: ALTCHOICE

## 2023-06-26 RX ORDER — IODIXANOL 320 MG/ML
INJECTION, SOLUTION INTRAVASCULAR
Status: DISCONTINUED
Start: 2023-06-26 | End: 2023-06-26 | Stop reason: HOSPADM

## 2023-06-26 RX ORDER — SODIUM CHLORIDE 9 MG/ML
INJECTION, SOLUTION INTRAVENOUS CONTINUOUS
Status: DISCONTINUED | OUTPATIENT
Start: 2023-06-26 | End: 2023-06-30 | Stop reason: HOSPADM

## 2023-06-26 RX ORDER — IODIXANOL 320 MG/ML
INJECTION, SOLUTION INTRAVASCULAR PRN
Status: DISCONTINUED | OUTPATIENT
Start: 2023-06-26 | End: 2023-06-26 | Stop reason: ALTCHOICE

## 2023-06-26 RX ADMIN — SODIUM CHLORIDE: 9 INJECTION, SOLUTION INTRAVENOUS at 08:56

## 2023-06-28 ENCOUNTER — HOSPITAL ENCOUNTER (OUTPATIENT)
Dept: PREADMISSION TESTING | Age: 53
Discharge: HOME | End: 2023-06-28
Payer: COMMERCIAL

## 2023-06-28 VITALS
RESPIRATION RATE: 18 BRPM | TEMPERATURE: 97 F | HEIGHT: 70 IN | OXYGEN SATURATION: 98 % | HEART RATE: 79 BPM | WEIGHT: 237 LBS | DIASTOLIC BLOOD PRESSURE: 80 MMHG | BODY MASS INDEX: 33.93 KG/M2 | SYSTOLIC BLOOD PRESSURE: 123 MMHG

## 2023-06-28 DIAGNOSIS — R19.00 RETROPERITONEAL MASS: ICD-10-CM

## 2023-06-28 LAB
ALBUMIN SERPL-MCNC: 4.3 G/DL (ref 3.5–5.2)
ALBUMIN/GLOB SERPL: 1.4 {RATIO} (ref 1–2.5)
ALP SERPL-CCNC: 58 U/L (ref 40–129)
ALT SERPL-CCNC: 35 U/L (ref 5–41)
ANION GAP SERPL CALCULATED.3IONS-SCNC: 11 MMOL/L (ref 9–17)
AST SERPL-CCNC: 29 U/L
BASOPHILS # BLD: 0.06 K/UL (ref 0–0.2)
BASOPHILS NFR BLD: 1 % (ref 0–2)
BILIRUB SERPL-MCNC: 0.7 MG/DL (ref 0.3–1.2)
BILIRUB UR QL STRIP: NEGATIVE
BNP SERPL-MCNC: <36 PG/ML
BUN SERPL-MCNC: 17 MG/DL (ref 6–20)
CALCIUM SERPL-MCNC: 9.4 MG/DL (ref 8.6–10.4)
CHLORIDE SERPL-SCNC: 102 MMOL/L (ref 98–107)
CLARITY UR: CLEAR
CO2 SERPL-SCNC: 26 MMOL/L (ref 20–31)
COLOR UR: YELLOW
COMMENT UA: NORMAL
CREAT SERPL-MCNC: 1 MG/DL (ref 0.7–1.2)
EOSINOPHIL # BLD: 0.21 K/UL (ref 0–0.44)
EOSINOPHILS RELATIVE PERCENT: 3 % (ref 1–4)
ERYTHROCYTE [DISTWIDTH] IN BLOOD BY AUTOMATED COUNT: 12.7 % (ref 11.8–14.4)
GFR SERPL CREATININE-BSD FRML MDRD: >60 ML/MIN/1.73M2
GLUCOSE SERPL-MCNC: 111 MG/DL (ref 70–99)
GLUCOSE UR STRIP-MCNC: NEGATIVE MG/DL
HCT VFR BLD AUTO: 45.8 % (ref 40.7–50.3)
HGB BLD-MCNC: 15.7 G/DL (ref 13–17)
HGB UR QL STRIP.AUTO: NEGATIVE
IMM GRANULOCYTES # BLD AUTO: <0.03 K/UL (ref 0–0.3)
IMM GRANULOCYTES NFR BLD: 0 %
INR PPP: 0.9
KETONES UR STRIP-MCNC: NEGATIVE MG/DL
LEUKOCYTE ESTERASE UR QL STRIP: NEGATIVE
LYMPHOCYTES # BLD: 35 % (ref 24–43)
LYMPHOCYTES NFR BLD: 2.14 K/UL (ref 1.1–3.7)
MAGNESIUM SERPL-MCNC: 2.1 MG/DL (ref 1.6–2.6)
MCH RBC QN AUTO: 31.1 PG (ref 25.2–33.5)
MCHC RBC AUTO-ENTMCNC: 34.3 G/DL (ref 28.4–34.8)
MCV RBC AUTO: 90.7 FL (ref 82.6–102.9)
MONOCYTES NFR BLD: 0.75 K/UL (ref 0.1–1.2)
MONOCYTES NFR BLD: 12 % (ref 3–12)
NEUTROPHILS NFR BLD: 49 % (ref 36–65)
NEUTS SEG NFR BLD: 2.93 K/UL (ref 1.5–8.1)
NITRITE UR QL STRIP: NEGATIVE
NRBC BLD-RTO: 0 PER 100 WBC
PH UR STRIP: 6.5 [PH] (ref 5–8)
PHOSPHATE SERPL-MCNC: 4 MG/DL (ref 2.5–4.5)
PLATELET # BLD AUTO: 264 K/UL (ref 138–453)
PMV BLD AUTO: 8.5 FL (ref 8.1–13.5)
POTASSIUM SERPL-SCNC: 4.1 MMOL/L (ref 3.7–5.3)
PREALB SERPL-MCNC: 27.1 MG/DL (ref 20–40)
PROT SERPL-MCNC: 7.4 G/DL (ref 6.4–8.3)
PROT UR STRIP-MCNC: NEGATIVE MG/DL
PROTHROMBIN TIME: 12.3 SEC (ref 11.7–14.9)
RBC # BLD AUTO: 5.05 M/UL (ref 4.21–5.77)
SODIUM SERPL-SCNC: 139 MMOL/L (ref 135–144)
SP GR UR STRIP: 1.01 (ref 1–1.03)
UROBILINOGEN UR STRIP-ACNC: NORMAL
WBC OTHER # BLD: 6.1 K/UL (ref 3.5–11.3)

## 2023-06-28 PROCEDURE — 85610 PROTHROMBIN TIME: CPT

## 2023-06-28 PROCEDURE — 85027 COMPLETE CBC AUTOMATED: CPT

## 2023-06-28 PROCEDURE — 83880 ASSAY OF NATRIURETIC PEPTIDE: CPT

## 2023-06-28 PROCEDURE — 84100 ASSAY OF PHOSPHORUS: CPT

## 2023-06-28 PROCEDURE — 84134 ASSAY OF PREALBUMIN: CPT

## 2023-06-28 PROCEDURE — 81003 URINALYSIS AUTO W/O SCOPE: CPT

## 2023-06-28 PROCEDURE — 36415 COLL VENOUS BLD VENIPUNCTURE: CPT

## 2023-06-28 PROCEDURE — 83735 ASSAY OF MAGNESIUM: CPT

## 2023-06-28 PROCEDURE — 80053 COMPREHEN METABOLIC PANEL: CPT

## 2023-06-29 ENCOUNTER — OFFICE VISIT (OUTPATIENT)
Dept: SURGERY | Age: 53
End: 2023-06-29
Payer: COMMERCIAL

## 2023-06-29 VITALS
HEART RATE: 86 BPM | SYSTOLIC BLOOD PRESSURE: 121 MMHG | HEIGHT: 70 IN | BODY MASS INDEX: 33.93 KG/M2 | DIASTOLIC BLOOD PRESSURE: 85 MMHG | WEIGHT: 237 LBS

## 2023-06-29 DIAGNOSIS — I82.431 ACUTE DEEP VEIN THROMBOSIS (DVT) OF POPLITEAL VEIN OF RIGHT LOWER EXTREMITY (HCC): Primary | ICD-10-CM

## 2023-06-29 DIAGNOSIS — R19.00 RETROPERITONEAL MASS: ICD-10-CM

## 2023-06-29 PROCEDURE — 3017F COLORECTAL CA SCREEN DOC REV: CPT | Performed by: SURGERY

## 2023-06-29 PROCEDURE — G8427 DOCREV CUR MEDS BY ELIG CLIN: HCPCS | Performed by: SURGERY

## 2023-06-29 PROCEDURE — 1036F TOBACCO NON-USER: CPT | Performed by: SURGERY

## 2023-06-29 PROCEDURE — G8417 CALC BMI ABV UP PARAM F/U: HCPCS | Performed by: SURGERY

## 2023-06-29 PROCEDURE — 99214 OFFICE O/P EST MOD 30 MIN: CPT | Performed by: SURGERY

## 2023-06-29 RX ORDER — METRONIDAZOLE 500 MG/1
500 TABLET ORAL 3 TIMES DAILY
Qty: 3 TABLET | Refills: 0 | Status: SHIPPED | OUTPATIENT
Start: 2023-06-29 | End: 2023-06-30

## 2023-06-29 RX ORDER — GABAPENTIN 100 MG/1
100 CAPSULE ORAL 3 TIMES DAILY
Qty: 9 CAPSULE | Refills: 0 | Status: SHIPPED | OUTPATIENT
Start: 2023-06-29 | End: 2023-07-02

## 2023-06-29 RX ORDER — NEOMYCIN SULFATE 500 MG/1
1000 TABLET ORAL 3 TIMES DAILY
Qty: 6 TABLET | Refills: 0 | Status: SHIPPED | OUTPATIENT
Start: 2023-06-29 | End: 2023-06-30

## 2023-06-29 ASSESSMENT — ENCOUNTER SYMPTOMS
SHORTNESS OF BREATH: 0
COUGH: 0
CONSTIPATION: 0
DIARRHEA: 0
ABDOMINAL PAIN: 0
CHEST TIGHTNESS: 0
VOMITING: 0
WHEEZING: 0
NAUSEA: 0
BLOOD IN STOOL: 0
ABDOMINAL DISTENTION: 0
BACK PAIN: 0

## 2023-07-06 ENCOUNTER — OFFICE VISIT (OUTPATIENT)
Dept: INTERNAL MEDICINE CLINIC | Age: 53
End: 2023-07-06
Payer: COMMERCIAL

## 2023-07-06 ENCOUNTER — OFFICE VISIT (OUTPATIENT)
Dept: UROLOGY | Age: 53
End: 2023-07-06
Payer: COMMERCIAL

## 2023-07-06 VITALS
WEIGHT: 237 LBS | SYSTOLIC BLOOD PRESSURE: 147 MMHG | TEMPERATURE: 97.5 F | HEART RATE: 82 BPM | HEIGHT: 67 IN | DIASTOLIC BLOOD PRESSURE: 99 MMHG | BODY MASS INDEX: 37.2 KG/M2

## 2023-07-06 VITALS
HEART RATE: 82 BPM | DIASTOLIC BLOOD PRESSURE: 98 MMHG | SYSTOLIC BLOOD PRESSURE: 146 MMHG | OXYGEN SATURATION: 98 % | WEIGHT: 237 LBS | BODY MASS INDEX: 37.12 KG/M2

## 2023-07-06 DIAGNOSIS — I27.82 OTHER CHRONIC PULMONARY EMBOLISM WITHOUT ACUTE COR PULMONALE (HCC): Primary | ICD-10-CM

## 2023-07-06 DIAGNOSIS — R19.00 ABDOMINAL WALL MASS OF LEFT FLANK: ICD-10-CM

## 2023-07-06 DIAGNOSIS — R19.00 RETROPERITONEAL MASS: Primary | ICD-10-CM

## 2023-07-06 PROCEDURE — 1036F TOBACCO NON-USER: CPT | Performed by: UROLOGY

## 2023-07-06 PROCEDURE — G8417 CALC BMI ABV UP PARAM F/U: HCPCS | Performed by: INTERNAL MEDICINE

## 2023-07-06 PROCEDURE — 3017F COLORECTAL CA SCREEN DOC REV: CPT | Performed by: INTERNAL MEDICINE

## 2023-07-06 PROCEDURE — 99204 OFFICE O/P NEW MOD 45 MIN: CPT | Performed by: UROLOGY

## 2023-07-06 PROCEDURE — 99213 OFFICE O/P EST LOW 20 MIN: CPT | Performed by: INTERNAL MEDICINE

## 2023-07-06 PROCEDURE — G8427 DOCREV CUR MEDS BY ELIG CLIN: HCPCS | Performed by: UROLOGY

## 2023-07-06 PROCEDURE — 1036F TOBACCO NON-USER: CPT | Performed by: INTERNAL MEDICINE

## 2023-07-06 PROCEDURE — G8427 DOCREV CUR MEDS BY ELIG CLIN: HCPCS | Performed by: INTERNAL MEDICINE

## 2023-07-06 PROCEDURE — 3017F COLORECTAL CA SCREEN DOC REV: CPT | Performed by: UROLOGY

## 2023-07-06 PROCEDURE — G8417 CALC BMI ABV UP PARAM F/U: HCPCS | Performed by: UROLOGY

## 2023-07-06 ASSESSMENT — ENCOUNTER SYMPTOMS
CONSTIPATION: 0
ABDOMINAL PAIN: 0
SHORTNESS OF BREATH: 1
EYE PAIN: 0
DIARRHEA: 0
BACK PAIN: 0
NAUSEA: 0
COUGH: 0
VOMITING: 0
ABDOMINAL PAIN: 1
WHEEZING: 0
SHORTNESS OF BREATH: 0
EYE REDNESS: 0

## 2023-07-06 NOTE — PROGRESS NOTES
Review of Systems   Constitutional:  Negative for chills, fatigue and fever. Eyes:  Negative for pain, redness and visual disturbance. Respiratory:  Negative for cough, shortness of breath and wheezing. Cardiovascular:  Negative for chest pain and leg swelling. Gastrointestinal:  Negative for abdominal pain, constipation, diarrhea, nausea and vomiting. Genitourinary:  Negative for difficulty urinating, dysuria, flank pain, frequency, hematuria, scrotal swelling, testicular pain and urgency. Musculoskeletal:  Negative for back pain, joint swelling and myalgias. Skin:  Negative for rash and wound. Neurological:  Negative for dizziness, tremors, weakness and numbness. Hematological:  Does not bruise/bleed easily.
service provider 06/28/2023    PCP, Dr. Nae Velasquez, due to visit july 2023    Under care of service provider 06/28/2023    GI, Dr. Gilberto Riley, last seen 3/21/23    Under care of service provider 06/28/2023    hematology Dr. Letty Asencio, last seen 3/3/23    Under care of service provider 06/28/2023    Vascular, Dr. Radha Higuera, last seen 3/7/23 : to have IVC filter placed prior to OR with Dr. Gutierrez Peterson    Under care of service provider 06/28/2023    [facility], Dr. Bentley Cotto- due to visit june 2023    Under care of service provider 06/28/2023    Cardiology, Dr. Princess Banks, has never seen / has appointment on 6/23/23 for clearance for surgery per Dr. Sabina Mcpherson request    Wears glasses      Past Surgical History:   Procedure Laterality Date    COLONOSCOPY N/A 03/21/2023    Dr. Gilberto iRley at [address] [place] [address] , WNL    CT BIOPSY ABDOMEN RETROPERITONEUM  02/22/2023    CT BIOPSY ABDOMEN RETROPERITONEUM 2/22/2023 [address] SCAN    IVC FILTER INSERTION  06/26/2023    STRABISMUS SURGERY Right 1982    STRABISMUS SURGERY Bilateral 1993    UPPER GASTROINTESTINAL ENDOSCOPY N/A 03/21/2023    BIOPSY, Dr. Gilberto Riley MD at  [address] [place] [address], mild gastritis    VASCULAR SURGERY N/A 6/26/2023    IVC FILTER PLACEMENT performed by Tariq Mark MD at [place] [address] History   Problem Relation Age of Onset    High Blood Pressure Mother     COPD Mother     Cancer Father      Outpatient Medications Marked as Taking for the 7/6/23 encounter (Office Visit) with Dwight Agarwal MD   Medication Sig Dispense Refill    apixaban (ELIQUIS) 5 MG TABS tablet Take 1 tablet by mouth 2 times daily 60 tablet 3       Seasonal  Social History     Tobacco Use   Smoking Status Never    Passive exposure: Never   Smokeless Tobacco Never      (If patient a smoker, smoking cessation counseling offered)   Social History     Substance and Sexual Activity   Alcohol Use Yes    Comment: rare       REVIEW OF SYSTEMS:  Review of Systems    Physical Exam:    This a 48 y.o. female      Vitals:    07/06/23
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
## 2023-07-07 ENCOUNTER — TELEPHONE (OUTPATIENT)
Dept: SURGERY | Age: 53
End: 2023-07-07

## 2023-07-07 NOTE — TELEPHONE ENCOUNTER
Spoke with patient at this time. Confirmed surgery to take place as scheduled on 7/12/23. Reviewed pre op instruction with patient. Informed patient that an adult needs to be present with patient and drive him home as per policy. Patient became agitated stating, 'I am not a baby.' Writer informed that anesthesia can still be in system up to 48 hours and even longer and driving after surgery will not be an option and that someone needs to be there in case of emergency. Writer clarified arrival time for patient at hospital. Patient voiced understanding and appreciation.

## 2023-07-11 ENCOUNTER — ANESTHESIA EVENT (OUTPATIENT)
Dept: OPERATING ROOM | Age: 53
End: 2023-07-11
Payer: COMMERCIAL

## 2023-07-12 ENCOUNTER — ANESTHESIA (OUTPATIENT)
Dept: OPERATING ROOM | Age: 53
End: 2023-07-12
Payer: COMMERCIAL

## 2023-07-12 ENCOUNTER — HOSPITAL ENCOUNTER (INPATIENT)
Age: 53
LOS: 2 days | Discharge: HOME | End: 2023-07-14
Attending: SURGERY | Admitting: SURGERY
Payer: COMMERCIAL

## 2023-07-12 DIAGNOSIS — D17.9 MYELOLIPOMA: ICD-10-CM

## 2023-07-12 DIAGNOSIS — R10.9 POSTOPERATIVE ABDOMINAL PAIN: Primary | ICD-10-CM

## 2023-07-12 DIAGNOSIS — I27.82 OTHER CHRONIC PULMONARY EMBOLISM WITHOUT ACUTE COR PULMONALE (HCC): ICD-10-CM

## 2023-07-12 DIAGNOSIS — G89.18 POSTOPERATIVE ABDOMINAL PAIN: Primary | ICD-10-CM

## 2023-07-12 LAB
ABO + RH BLD: NORMAL
ALBUMIN SERPL-MCNC: 4.3 G/DL (ref 3.5–5.2)
ALBUMIN/GLOB SERPL: 1.5 {RATIO} (ref 1–2.5)
ALP SERPL-CCNC: 58 U/L (ref 40–129)
ALT SERPL-CCNC: 38 U/L (ref 5–41)
ANION GAP SERPL CALCULATED.3IONS-SCNC: 13 MMOL/L (ref 9–17)
ARM BAND NUMBER: NORMAL
AST SERPL-CCNC: 31 U/L
BASOPHILS # BLD: 0.07 K/UL (ref 0–0.2)
BASOPHILS NFR BLD: 1 % (ref 0–2)
BILIRUB SERPL-MCNC: 0.8 MG/DL (ref 0.3–1.2)
BLOOD GROUP ANTIBODIES SERPL: NEGATIVE
BUN SERPL-MCNC: 11 MG/DL (ref 6–20)
CALCIUM SERPL-MCNC: 8.8 MG/DL (ref 8.6–10.4)
CHLORIDE SERPL-SCNC: 102 MMOL/L (ref 98–107)
CO2 SERPL-SCNC: 24 MMOL/L (ref 20–31)
CREAT SERPL-MCNC: 0.8 MG/DL (ref 0.7–1.2)
EOSINOPHIL # BLD: 0.06 K/UL (ref 0–0.44)
EOSINOPHILS RELATIVE PERCENT: 1 % (ref 1–4)
ERYTHROCYTE [DISTWIDTH] IN BLOOD BY AUTOMATED COUNT: 13.1 % (ref 11.8–14.4)
EXPIRATION DATE: NORMAL
GFR SERPL CREATININE-BSD FRML MDRD: >60 ML/MIN/1.73M2
GLUCOSE SERPL-MCNC: 126 MG/DL (ref 70–99)
HCT VFR BLD AUTO: 46.3 % (ref 40.7–50.3)
HGB BLD-MCNC: 15.2 G/DL (ref 13–17)
IMM GRANULOCYTES # BLD AUTO: 0.07 K/UL (ref 0–0.3)
IMM GRANULOCYTES NFR BLD: 1 %
INR PPP: 1
LYMPHOCYTES # BLD: 22 % (ref 24–43)
LYMPHOCYTES NFR BLD: 2.04 K/UL (ref 1.1–3.7)
MAGNESIUM SERPL-MCNC: 2 MG/DL (ref 1.6–2.6)
MCH RBC QN AUTO: 30.4 PG (ref 25.2–33.5)
MCHC RBC AUTO-ENTMCNC: 32.8 G/DL (ref 28.4–34.8)
MCV RBC AUTO: 92.6 FL (ref 82.6–102.9)
MONOCYTES NFR BLD: 0.28 K/UL (ref 0.1–1.2)
MONOCYTES NFR BLD: 3 % (ref 3–12)
NEUTROPHILS NFR BLD: 72 % (ref 36–65)
NEUTS SEG NFR BLD: 6.98 K/UL (ref 1.5–8.1)
NRBC BLD-RTO: 0 PER 100 WBC
PHOSPHATE SERPL-MCNC: 3.2 MG/DL (ref 2.5–4.5)
PLATELET # BLD AUTO: 270 K/UL (ref 138–453)
PMV BLD AUTO: 8.7 FL (ref 8.1–13.5)
POTASSIUM SERPL-SCNC: 3.4 MMOL/L (ref 3.7–5.3)
PROT SERPL-MCNC: 7.1 G/DL (ref 6.4–8.3)
PROTHROMBIN TIME: 13.2 SEC (ref 11.7–14.9)
RBC # BLD AUTO: 5 M/UL (ref 4.21–5.77)
SODIUM SERPL-SCNC: 139 MMOL/L (ref 135–144)
WBC OTHER # BLD: 9.5 K/UL (ref 3.5–11.3)

## 2023-07-12 PROCEDURE — 6370000000 HC RX 637 (ALT 250 FOR IP): Performed by: SURGERY

## 2023-07-12 PROCEDURE — 86900 BLOOD TYPING SEROLOGIC ABO: CPT

## 2023-07-12 PROCEDURE — 85610 PROTHROMBIN TIME: CPT

## 2023-07-12 PROCEDURE — 88311 DECALCIFY TISSUE: CPT

## 2023-07-12 PROCEDURE — 2060000000 HC ICU INTERMEDIATE R&B

## 2023-07-12 PROCEDURE — 85027 COMPLETE CBC AUTOMATED: CPT

## 2023-07-12 PROCEDURE — 0WBH4ZZ EXCISION OF RETROPERITONEUM, PERCUTANEOUS ENDOSCOPIC APPROACH: ICD-10-PCS | Performed by: SURGERY

## 2023-07-12 PROCEDURE — P9017 PLASMA 1 DONOR FRZ W/IN 8 HR: HCPCS

## 2023-07-12 PROCEDURE — C9399 UNCLASSIFIED DRUGS OR BIOLOG: HCPCS | Performed by: ANESTHESIOLOGY

## 2023-07-12 PROCEDURE — 6360000002 HC RX W HCPCS: Performed by: SURGERY

## 2023-07-12 PROCEDURE — C1747 HC ENDOSCOPE, SINGLE, URINARY TRACT: HCPCS | Performed by: SURGERY

## 2023-07-12 PROCEDURE — S2900 ROBOTIC SURGICAL SYSTEM: HCPCS | Performed by: SURGERY

## 2023-07-12 PROCEDURE — 3600000019 HC SURGERY ROBOT ADDTL 15MIN: Performed by: SURGERY

## 2023-07-12 PROCEDURE — 86927 PLASMA FRESH FROZEN: CPT

## 2023-07-12 PROCEDURE — C1758 CATHETER, URETERAL: HCPCS | Performed by: SURGERY

## 2023-07-12 PROCEDURE — 84100 ASSAY OF PHOSPHORUS: CPT

## 2023-07-12 PROCEDURE — 2500000003 HC RX 250 WO HCPCS: Performed by: ANESTHESIOLOGY

## 2023-07-12 PROCEDURE — 86850 RBC ANTIBODY SCREEN: CPT

## 2023-07-12 PROCEDURE — 3700000001 HC ADD 15 MINUTES (ANESTHESIA): Performed by: SURGERY

## 2023-07-12 PROCEDURE — C1769 GUIDE WIRE: HCPCS | Performed by: SURGERY

## 2023-07-12 PROCEDURE — 3600000009 HC SURGERY ROBOT BASE: Performed by: SURGERY

## 2023-07-12 PROCEDURE — 2720000010 HC SURG SUPPLY STERILE: Performed by: SURGERY

## 2023-07-12 PROCEDURE — 7100000000 HC PACU RECOVERY - FIRST 15 MIN: Performed by: SURGERY

## 2023-07-12 PROCEDURE — 88307 TISSUE EXAM BY PATHOLOGIST: CPT

## 2023-07-12 PROCEDURE — 0T778DZ DILATION OF LEFT URETER WITH INTRALUMINAL DEVICE, VIA NATURAL OR ARTIFICIAL OPENING ENDOSCOPIC: ICD-10-PCS | Performed by: STUDENT IN AN ORGANIZED HEALTH CARE EDUCATION/TRAINING PROGRAM

## 2023-07-12 PROCEDURE — 64488 TAP BLOCK BI INJECTION: CPT | Performed by: ANESTHESIOLOGY

## 2023-07-12 PROCEDURE — 8E0W4CZ ROBOTIC ASSISTED PROCEDURE OF TRUNK REGION, PERCUTANEOUS ENDOSCOPIC APPROACH: ICD-10-PCS | Performed by: SURGERY

## 2023-07-12 PROCEDURE — 2580000003 HC RX 258: Performed by: SURGERY

## 2023-07-12 PROCEDURE — 6360000002 HC RX W HCPCS: Performed by: ANESTHESIOLOGY

## 2023-07-12 PROCEDURE — 94761 N-INVAS EAR/PLS OXIMETRY MLT: CPT

## 2023-07-12 PROCEDURE — 83735 ASSAY OF MAGNESIUM: CPT

## 2023-07-12 PROCEDURE — 7100000001 HC PACU RECOVERY - ADDTL 15 MIN: Performed by: SURGERY

## 2023-07-12 PROCEDURE — 0T9780Z DRAINAGE OF LEFT URETER WITH DRAINAGE DEVICE, VIA NATURAL OR ARTIFICIAL OPENING ENDOSCOPIC: ICD-10-PCS | Performed by: STUDENT IN AN ORGANIZED HEALTH CARE EDUCATION/TRAINING PROGRAM

## 2023-07-12 PROCEDURE — 2700000000 HC OXYGEN THERAPY PER DAY

## 2023-07-12 PROCEDURE — 0WBH4ZX EXCISION OF RETROPERITONEUM, PERCUTANEOUS ENDOSCOPIC APPROACH, DIAGNOSTIC: ICD-10-PCS | Performed by: SURGERY

## 2023-07-12 PROCEDURE — 2709999900 HC NON-CHARGEABLE SUPPLY: Performed by: SURGERY

## 2023-07-12 PROCEDURE — 86901 BLOOD TYPING SEROLOGIC RH(D): CPT

## 2023-07-12 PROCEDURE — C9113 INJ PANTOPRAZOLE SODIUM, VIA: HCPCS | Performed by: SURGERY

## 2023-07-12 PROCEDURE — 80053 COMPREHEN METABOLIC PANEL: CPT

## 2023-07-12 PROCEDURE — 2580000003 HC RX 258: Performed by: ANESTHESIOLOGY

## 2023-07-12 PROCEDURE — 49329 UNLSTD LAPS PX ABD PERTM&OMN: CPT | Performed by: SURGERY

## 2023-07-12 PROCEDURE — 3700000000 HC ANESTHESIA ATTENDED CARE: Performed by: SURGERY

## 2023-07-12 RX ORDER — METOCLOPRAMIDE HYDROCHLORIDE 5 MG/ML
10 INJECTION INTRAMUSCULAR; INTRAVENOUS
Status: DISCONTINUED | OUTPATIENT
Start: 2023-07-12 | End: 2023-07-12

## 2023-07-12 RX ORDER — METRONIDAZOLE 500 MG/1
500 TABLET ORAL 3 TIMES DAILY
Status: ON HOLD | COMMUNITY
End: 2023-07-14 | Stop reason: HOSPADM

## 2023-07-12 RX ORDER — GABAPENTIN 300 MG/1
300 CAPSULE ORAL 3 TIMES DAILY
Status: DISCONTINUED | OUTPATIENT
Start: 2023-07-12 | End: 2023-07-13

## 2023-07-12 RX ORDER — METHOCARBAMOL 100 MG/ML
1000 INJECTION, SOLUTION INTRAMUSCULAR; INTRAVENOUS EVERY 8 HOURS
Status: DISCONTINUED | OUTPATIENT
Start: 2023-07-12 | End: 2023-07-14 | Stop reason: HOSPADM

## 2023-07-12 RX ORDER — SODIUM CHLORIDE, SODIUM LACTATE, POTASSIUM CHLORIDE, CALCIUM CHLORIDE 600; 310; 30; 20 MG/100ML; MG/100ML; MG/100ML; MG/100ML
INJECTION, SOLUTION INTRAVENOUS CONTINUOUS
Status: DISCONTINUED | OUTPATIENT
Start: 2023-07-12 | End: 2023-07-12 | Stop reason: HOSPADM

## 2023-07-12 RX ORDER — DROPERIDOL 2.5 MG/ML
0.62 INJECTION, SOLUTION INTRAMUSCULAR; INTRAVENOUS
Status: DISCONTINUED | OUTPATIENT
Start: 2023-07-12 | End: 2023-07-12

## 2023-07-12 RX ORDER — HYDRALAZINE HYDROCHLORIDE 20 MG/ML
10 INJECTION INTRAMUSCULAR; INTRAVENOUS EVERY 4 HOURS PRN
Status: DISCONTINUED | OUTPATIENT
Start: 2023-07-12 | End: 2023-07-14 | Stop reason: HOSPADM

## 2023-07-12 RX ORDER — DIPHENHYDRAMINE HYDROCHLORIDE 50 MG/ML
12.5 INJECTION INTRAMUSCULAR; INTRAVENOUS
Status: DISCONTINUED | OUTPATIENT
Start: 2023-07-12 | End: 2023-07-12 | Stop reason: HOSPADM

## 2023-07-12 RX ORDER — SODIUM CHLORIDE 9 MG/ML
INJECTION, SOLUTION INTRAVENOUS PRN
Status: DISCONTINUED | OUTPATIENT
Start: 2023-07-12 | End: 2023-07-14 | Stop reason: HOSPADM

## 2023-07-12 RX ORDER — SODIUM CHLORIDE 0.9 % (FLUSH) 0.9 %
5-40 SYRINGE (ML) INJECTION EVERY 12 HOURS SCHEDULED
Status: DISCONTINUED | OUTPATIENT
Start: 2023-07-12 | End: 2023-07-12 | Stop reason: HOSPADM

## 2023-07-12 RX ORDER — SODIUM CHLORIDE 0.9 % (FLUSH) 0.9 %
5-40 SYRINGE (ML) INJECTION PRN
Status: DISCONTINUED | OUTPATIENT
Start: 2023-07-12 | End: 2023-07-12 | Stop reason: HOSPADM

## 2023-07-12 RX ORDER — NALOXONE HYDROCHLORIDE 0.4 MG/ML
INJECTION, SOLUTION INTRAMUSCULAR; INTRAVENOUS; SUBCUTANEOUS PRN
Status: DISCONTINUED | OUTPATIENT
Start: 2023-07-12 | End: 2023-07-13

## 2023-07-12 RX ORDER — MAGNESIUM HYDROXIDE 1200 MG/15ML
LIQUID ORAL PRN
Status: DISCONTINUED | OUTPATIENT
Start: 2023-07-12 | End: 2023-07-12 | Stop reason: HOSPADM

## 2023-07-12 RX ORDER — NEOMYCIN SULFATE 500 MG/1
1000 TABLET ORAL 3 TIMES DAILY
Status: ON HOLD | COMMUNITY
End: 2023-07-14 | Stop reason: HOSPADM

## 2023-07-12 RX ORDER — SODIUM CHLORIDE 9 MG/ML
INJECTION, SOLUTION INTRAVENOUS PRN
Status: DISCONTINUED | OUTPATIENT
Start: 2023-07-12 | End: 2023-07-12 | Stop reason: HOSPADM

## 2023-07-12 RX ORDER — ONDANSETRON 2 MG/ML
4 INJECTION INTRAMUSCULAR; INTRAVENOUS EVERY 6 HOURS PRN
Status: DISCONTINUED | OUTPATIENT
Start: 2023-07-12 | End: 2023-07-14 | Stop reason: HOSPADM

## 2023-07-12 RX ORDER — PROPOFOL 10 MG/ML
INJECTION, EMULSION INTRAVENOUS PRN
Status: DISCONTINUED | OUTPATIENT
Start: 2023-07-12 | End: 2023-07-12 | Stop reason: SDUPTHER

## 2023-07-12 RX ORDER — MIDAZOLAM HYDROCHLORIDE 1 MG/ML
INJECTION INTRAMUSCULAR; INTRAVENOUS PRN
Status: DISCONTINUED | OUTPATIENT
Start: 2023-07-12 | End: 2023-07-12 | Stop reason: SDUPTHER

## 2023-07-12 RX ORDER — ROCURONIUM BROMIDE 10 MG/ML
INJECTION, SOLUTION INTRAVENOUS PRN
Status: DISCONTINUED | OUTPATIENT
Start: 2023-07-12 | End: 2023-07-12 | Stop reason: SDUPTHER

## 2023-07-12 RX ORDER — MAGNESIUM HYDROXIDE 1200 MG/15ML
LIQUID ORAL CONTINUOUS PRN
Status: DISCONTINUED | OUTPATIENT
Start: 2023-07-12 | End: 2023-07-12 | Stop reason: HOSPADM

## 2023-07-12 RX ORDER — SODIUM CHLORIDE 0.9 % (FLUSH) 0.9 %
5-40 SYRINGE (ML) INJECTION PRN
Status: DISCONTINUED | OUTPATIENT
Start: 2023-07-12 | End: 2023-07-14 | Stop reason: HOSPADM

## 2023-07-12 RX ORDER — DEXAMETHASONE SODIUM PHOSPHATE 10 MG/ML
INJECTION INTRAMUSCULAR; INTRAVENOUS PRN
Status: DISCONTINUED | OUTPATIENT
Start: 2023-07-12 | End: 2023-07-12 | Stop reason: SDUPTHER

## 2023-07-12 RX ORDER — SODIUM CHLORIDE 0.9 % (FLUSH) 0.9 %
5-40 SYRINGE (ML) INJECTION EVERY 12 HOURS SCHEDULED
Status: DISCONTINUED | OUTPATIENT
Start: 2023-07-12 | End: 2023-07-12

## 2023-07-12 RX ORDER — LABETALOL HYDROCHLORIDE 5 MG/ML
10 INJECTION, SOLUTION INTRAVENOUS EVERY 4 HOURS PRN
Status: DISCONTINUED | OUTPATIENT
Start: 2023-07-12 | End: 2023-07-14 | Stop reason: HOSPADM

## 2023-07-12 RX ORDER — MORPHINE SULFATE 2 MG/ML
2 INJECTION, SOLUTION INTRAMUSCULAR; INTRAVENOUS EVERY 5 MIN PRN
Status: DISCONTINUED | OUTPATIENT
Start: 2023-07-12 | End: 2023-07-12 | Stop reason: HOSPADM

## 2023-07-12 RX ORDER — MAGNESIUM SULFATE IN WATER 40 MG/ML
2000 INJECTION, SOLUTION INTRAVENOUS PRN
Status: DISCONTINUED | OUTPATIENT
Start: 2023-07-12 | End: 2023-07-14 | Stop reason: HOSPADM

## 2023-07-12 RX ORDER — FENTANYL CITRATE 50 UG/ML
25 INJECTION, SOLUTION INTRAMUSCULAR; INTRAVENOUS EVERY 5 MIN PRN
Status: DISCONTINUED | OUTPATIENT
Start: 2023-07-12 | End: 2023-07-12 | Stop reason: HOSPADM

## 2023-07-12 RX ORDER — OXYCODONE HYDROCHLORIDE 5 MG/1
5 TABLET ORAL EVERY 4 HOURS PRN
Status: DISCONTINUED | OUTPATIENT
Start: 2023-07-12 | End: 2023-07-14 | Stop reason: HOSPADM

## 2023-07-12 RX ORDER — SODIUM CHLORIDE 9 MG/ML
INJECTION, SOLUTION INTRAVENOUS PRN
Status: DISCONTINUED | OUTPATIENT
Start: 2023-07-12 | End: 2023-07-12

## 2023-07-12 RX ORDER — ONDANSETRON 2 MG/ML
INJECTION INTRAMUSCULAR; INTRAVENOUS PRN
Status: DISCONTINUED | OUTPATIENT
Start: 2023-07-12 | End: 2023-07-12 | Stop reason: SDUPTHER

## 2023-07-12 RX ORDER — LIDOCAINE HYDROCHLORIDE 10 MG/ML
INJECTION, SOLUTION EPIDURAL; INFILTRATION; INTRACAUDAL; PERINEURAL PRN
Status: DISCONTINUED | OUTPATIENT
Start: 2023-07-12 | End: 2023-07-12 | Stop reason: SDUPTHER

## 2023-07-12 RX ORDER — ACETAMINOPHEN 500 MG
1000 TABLET ORAL
Status: DISCONTINUED | OUTPATIENT
Start: 2023-07-12 | End: 2023-07-12 | Stop reason: HOSPADM

## 2023-07-12 RX ORDER — SODIUM CHLORIDE 0.9 % (FLUSH) 0.9 %
5-40 SYRINGE (ML) INJECTION PRN
Status: DISCONTINUED | OUTPATIENT
Start: 2023-07-12 | End: 2023-07-12

## 2023-07-12 RX ORDER — FENTANYL CITRATE 50 UG/ML
INJECTION, SOLUTION INTRAMUSCULAR; INTRAVENOUS PRN
Status: DISCONTINUED | OUTPATIENT
Start: 2023-07-12 | End: 2023-07-12 | Stop reason: SDUPTHER

## 2023-07-12 RX ORDER — GABAPENTIN 300 MG/1
300 CAPSULE ORAL
Status: DISCONTINUED | OUTPATIENT
Start: 2023-07-12 | End: 2023-07-12 | Stop reason: HOSPADM

## 2023-07-12 RX ORDER — POTASSIUM CHLORIDE 20 MEQ/1
40 TABLET, EXTENDED RELEASE ORAL PRN
Status: DISCONTINUED | OUTPATIENT
Start: 2023-07-12 | End: 2023-07-14 | Stop reason: HOSPADM

## 2023-07-12 RX ORDER — HYDRALAZINE HYDROCHLORIDE 20 MG/ML
10 INJECTION INTRAMUSCULAR; INTRAVENOUS
Status: DISCONTINUED | OUTPATIENT
Start: 2023-07-12 | End: 2023-07-12

## 2023-07-12 RX ORDER — SODIUM CHLORIDE, SODIUM LACTATE, POTASSIUM CHLORIDE, CALCIUM CHLORIDE 600; 310; 30; 20 MG/100ML; MG/100ML; MG/100ML; MG/100ML
INJECTION, SOLUTION INTRAVENOUS CONTINUOUS PRN
Status: DISCONTINUED | OUTPATIENT
Start: 2023-07-12 | End: 2023-07-12 | Stop reason: SDUPTHER

## 2023-07-12 RX ORDER — MEPERIDINE HYDROCHLORIDE 50 MG/ML
12.5 INJECTION INTRAMUSCULAR; INTRAVENOUS; SUBCUTANEOUS EVERY 5 MIN PRN
Status: DISCONTINUED | OUTPATIENT
Start: 2023-07-12 | End: 2023-07-12

## 2023-07-12 RX ORDER — ENOXAPARIN SODIUM 100 MG/ML
30 INJECTION SUBCUTANEOUS 2 TIMES DAILY
Status: DISCONTINUED | OUTPATIENT
Start: 2023-07-13 | End: 2023-07-14 | Stop reason: HOSPADM

## 2023-07-12 RX ORDER — ONDANSETRON 4 MG/1
4 TABLET, ORALLY DISINTEGRATING ORAL EVERY 8 HOURS PRN
Status: DISCONTINUED | OUTPATIENT
Start: 2023-07-12 | End: 2023-07-14 | Stop reason: HOSPADM

## 2023-07-12 RX ORDER — POTASSIUM CHLORIDE 7.45 MG/ML
10 INJECTION INTRAVENOUS PRN
Status: DISCONTINUED | OUTPATIENT
Start: 2023-07-12 | End: 2023-07-14 | Stop reason: HOSPADM

## 2023-07-12 RX ORDER — DIPHENHYDRAMINE HYDROCHLORIDE 50 MG/ML
12.5 INJECTION INTRAMUSCULAR; INTRAVENOUS
Status: DISCONTINUED | OUTPATIENT
Start: 2023-07-12 | End: 2023-07-12

## 2023-07-12 RX ORDER — POTASSIUM CHLORIDE 7.45 MG/ML
10 INJECTION INTRAVENOUS
Status: COMPLETED | OUTPATIENT
Start: 2023-07-12 | End: 2023-07-12

## 2023-07-12 RX ORDER — ERTAPENEM 1 G/1
INJECTION, POWDER, LYOPHILIZED, FOR SOLUTION INTRAMUSCULAR; INTRAVENOUS PRN
Status: DISCONTINUED | OUTPATIENT
Start: 2023-07-12 | End: 2023-07-12 | Stop reason: SDUPTHER

## 2023-07-12 RX ORDER — OXYCODONE HYDROCHLORIDE 5 MG/1
10 TABLET ORAL EVERY 4 HOURS PRN
Status: DISCONTINUED | OUTPATIENT
Start: 2023-07-12 | End: 2023-07-14 | Stop reason: HOSPADM

## 2023-07-12 RX ORDER — DIPHENHYDRAMINE HYDROCHLORIDE 50 MG/ML
12.5 INJECTION INTRAMUSCULAR; INTRAVENOUS EVERY 6 HOURS PRN
Status: DISCONTINUED | OUTPATIENT
Start: 2023-07-12 | End: 2023-07-14 | Stop reason: HOSPADM

## 2023-07-12 RX ORDER — METHOCARBAMOL 100 MG/ML
1000 INJECTION, SOLUTION INTRAMUSCULAR; INTRAVENOUS EVERY 8 HOURS
Status: DISCONTINUED | OUTPATIENT
Start: 2023-07-12 | End: 2023-07-12

## 2023-07-12 RX ORDER — SODIUM CHLORIDE 0.9 % (FLUSH) 0.9 %
5-40 SYRINGE (ML) INJECTION EVERY 12 HOURS SCHEDULED
Status: DISCONTINUED | OUTPATIENT
Start: 2023-07-12 | End: 2023-07-14 | Stop reason: HOSPADM

## 2023-07-12 RX ORDER — ONDANSETRON 2 MG/ML
4 INJECTION INTRAMUSCULAR; INTRAVENOUS
Status: DISCONTINUED | OUTPATIENT
Start: 2023-07-12 | End: 2023-07-12 | Stop reason: HOSPADM

## 2023-07-12 RX ORDER — SODIUM CHLORIDE, SODIUM LACTATE, POTASSIUM CHLORIDE, CALCIUM CHLORIDE 600; 310; 30; 20 MG/100ML; MG/100ML; MG/100ML; MG/100ML
INJECTION, SOLUTION INTRAVENOUS CONTINUOUS
Status: DISCONTINUED | OUTPATIENT
Start: 2023-07-12 | End: 2023-07-13

## 2023-07-12 RX ORDER — BUPIVACAINE HYDROCHLORIDE 5 MG/ML
40 INJECTION, SOLUTION EPIDURAL; INTRACAUDAL ONCE
Status: COMPLETED | OUTPATIENT
Start: 2023-07-12 | End: 2023-07-12

## 2023-07-12 RX ORDER — ACETAMINOPHEN 500 MG
1000 TABLET ORAL EVERY 8 HOURS SCHEDULED
Status: DISCONTINUED | OUTPATIENT
Start: 2023-07-12 | End: 2023-07-14 | Stop reason: HOSPADM

## 2023-07-12 RX ADMIN — DEXAMETHASONE SODIUM PHOSPHATE 10 MG: 10 INJECTION INTRAMUSCULAR; INTRAVENOUS at 08:52

## 2023-07-12 RX ADMIN — ONDANSETRON 4 MG: 2 INJECTION INTRAMUSCULAR; INTRAVENOUS at 20:50

## 2023-07-12 RX ADMIN — MIDAZOLAM 2 MG: 1 INJECTION INTRAMUSCULAR; INTRAVENOUS at 08:32

## 2023-07-12 RX ADMIN — BUPIVACAINE HYDROCHLORIDE 200 MG: 5 INJECTION, SOLUTION EPIDURAL; INTRACAUDAL at 11:33

## 2023-07-12 RX ADMIN — ONDANSETRON 4 MG: 2 INJECTION INTRAMUSCULAR; INTRAVENOUS at 14:00

## 2023-07-12 RX ADMIN — PROPOFOL 250 MG: 10 INJECTION, EMULSION INTRAVENOUS at 08:38

## 2023-07-12 RX ADMIN — ONDANSETRON 4 MG: 2 INJECTION INTRAMUSCULAR; INTRAVENOUS at 10:43

## 2023-07-12 RX ADMIN — ERTAPENEM SODIUM 1000 MG: 1 INJECTION INTRAMUSCULAR; INTRAVENOUS at 08:59

## 2023-07-12 RX ADMIN — FENTANYL CITRATE 50 MCG: 0.05 INJECTION, SOLUTION INTRAMUSCULAR; INTRAVENOUS at 09:38

## 2023-07-12 RX ADMIN — FENTANYL CITRATE 50 MCG: 0.05 INJECTION, SOLUTION INTRAMUSCULAR; INTRAVENOUS at 09:16

## 2023-07-12 RX ADMIN — POTASSIUM CHLORIDE 10 MEQ: 10 INJECTION, SOLUTION INTRAVENOUS at 22:12

## 2023-07-12 RX ADMIN — NALOXEGOL OXALATE 25 MG: 12.5 TABLET, FILM COATED ORAL at 06:45

## 2023-07-12 RX ADMIN — ACETAMINOPHEN 1000 MG: 500 TABLET ORAL at 20:50

## 2023-07-12 RX ADMIN — SUGAMMADEX 200 MG: 100 INJECTION, SOLUTION INTRAVENOUS at 11:33

## 2023-07-12 RX ADMIN — GABAPENTIN 300 MG: 300 CAPSULE ORAL at 06:45

## 2023-07-12 RX ADMIN — GABAPENTIN 300 MG: 300 CAPSULE ORAL at 14:45

## 2023-07-12 RX ADMIN — SODIUM CHLORIDE, POTASSIUM CHLORIDE, SODIUM LACTATE AND CALCIUM CHLORIDE: 600; 310; 30; 20 INJECTION, SOLUTION INTRAVENOUS at 07:04

## 2023-07-12 RX ADMIN — Medication: at 12:53

## 2023-07-12 RX ADMIN — ROCURONIUM BROMIDE 70 MG: 10 INJECTION, SOLUTION INTRAVENOUS at 08:38

## 2023-07-12 RX ADMIN — SODIUM CHLORIDE, PRESERVATIVE FREE 10 ML: 5 INJECTION INTRAVENOUS at 14:36

## 2023-07-12 RX ADMIN — LIDOCAINE HYDROCHLORIDE 50 MG: 10 INJECTION, SOLUTION EPIDURAL; INFILTRATION; INTRACAUDAL; PERINEURAL at 08:38

## 2023-07-12 RX ADMIN — SODIUM CHLORIDE, PRESERVATIVE FREE 40 MG: 5 INJECTION INTRAVENOUS at 14:45

## 2023-07-12 RX ADMIN — POTASSIUM CHLORIDE 10 MEQ: 10 INJECTION, SOLUTION INTRAVENOUS at 17:28

## 2023-07-12 RX ADMIN — FENTANYL CITRATE 100 MCG: 0.05 INJECTION, SOLUTION INTRAMUSCULAR; INTRAVENOUS at 08:38

## 2023-07-12 RX ADMIN — POTASSIUM CHLORIDE 10 MEQ: 10 INJECTION, SOLUTION INTRAVENOUS at 15:00

## 2023-07-12 RX ADMIN — METHOCARBAMOL 1000 MG: 100 INJECTION INTRAMUSCULAR; INTRAVENOUS at 22:14

## 2023-07-12 RX ADMIN — FENTANYL CITRATE 50 MCG: 0.05 INJECTION, SOLUTION INTRAMUSCULAR; INTRAVENOUS at 11:37

## 2023-07-12 RX ADMIN — FENTANYL CITRATE 50 MCG: 0.05 INJECTION, SOLUTION INTRAMUSCULAR; INTRAVENOUS at 11:18

## 2023-07-12 RX ADMIN — SODIUM CHLORIDE, POTASSIUM CHLORIDE, SODIUM LACTATE AND CALCIUM CHLORIDE: 600; 310; 30; 20 INJECTION, SOLUTION INTRAVENOUS at 08:45

## 2023-07-12 RX ADMIN — ACETAMINOPHEN 1000 MG: 500 TABLET ORAL at 14:51

## 2023-07-12 RX ADMIN — MAGNESIUM HYDROXIDE 15 ML: 400 SUSPENSION ORAL at 20:51

## 2023-07-12 RX ADMIN — POTASSIUM CHLORIDE 10 MEQ: 10 INJECTION, SOLUTION INTRAVENOUS at 18:35

## 2023-07-12 RX ADMIN — HYDRALAZINE HYDROCHLORIDE 10 MG: 20 INJECTION, SOLUTION INTRAMUSCULAR; INTRAVENOUS at 14:00

## 2023-07-12 RX ADMIN — POTASSIUM CHLORIDE 10 MEQ: 10 INJECTION, SOLUTION INTRAVENOUS at 20:55

## 2023-07-12 RX ADMIN — SODIUM CHLORIDE, POTASSIUM CHLORIDE, SODIUM LACTATE AND CALCIUM CHLORIDE: 600; 310; 30; 20 INJECTION, SOLUTION INTRAVENOUS at 12:15

## 2023-07-12 RX ADMIN — POTASSIUM CHLORIDE 10 MEQ: 10 INJECTION, SOLUTION INTRAVENOUS at 16:06

## 2023-07-12 RX ADMIN — SODIUM CHLORIDE, POTASSIUM CHLORIDE, SODIUM LACTATE AND CALCIUM CHLORIDE: 600; 310; 30; 20 INJECTION, SOLUTION INTRAVENOUS at 22:08

## 2023-07-12 RX ADMIN — FENTANYL CITRATE 50 MCG: 0.05 INJECTION, SOLUTION INTRAMUSCULAR; INTRAVENOUS at 11:16

## 2023-07-12 RX ADMIN — ACETAMINOPHEN 1000 MG: 500 TABLET ORAL at 06:45

## 2023-07-12 RX ADMIN — GABAPENTIN 300 MG: 300 CAPSULE ORAL at 20:51

## 2023-07-12 RX ADMIN — METHOCARBAMOL 1000 MG: 100 INJECTION INTRAMUSCULAR; INTRAVENOUS at 14:44

## 2023-07-12 RX ADMIN — Medication 2 G: at 08:58

## 2023-07-12 RX ADMIN — PROPOFOL 50 MG: 10 INJECTION, EMULSION INTRAVENOUS at 08:40

## 2023-07-12 ASSESSMENT — PAIN DESCRIPTION - LOCATION
LOCATION: ABDOMEN

## 2023-07-12 ASSESSMENT — PAIN SCALES - GENERAL
PAINLEVEL_OUTOF10: 0
PAINLEVEL_OUTOF10: 4
PAINLEVEL_OUTOF10: 0
PAINLEVEL_OUTOF10: 3
PAINLEVEL_OUTOF10: 1
PAINLEVEL_OUTOF10: 0
PAINLEVEL_OUTOF10: 0

## 2023-07-12 ASSESSMENT — PAIN DESCRIPTION - ORIENTATION
ORIENTATION: RIGHT;LEFT
ORIENTATION: ANTERIOR
ORIENTATION: RIGHT;LEFT

## 2023-07-12 ASSESSMENT — PAIN DESCRIPTION - DESCRIPTORS
DESCRIPTORS: ACHING;SORE
DESCRIPTORS: DULL
DESCRIPTORS: ACHING
DESCRIPTORS: SORE;TENDER

## 2023-07-12 ASSESSMENT — PAIN DESCRIPTION - FREQUENCY: FREQUENCY: INTERMITTENT

## 2023-07-12 ASSESSMENT — PAIN DESCRIPTION - PAIN TYPE: TYPE: SURGICAL PAIN

## 2023-07-12 ASSESSMENT — PAIN - FUNCTIONAL ASSESSMENT: PAIN_FUNCTIONAL_ASSESSMENT: 0-10

## 2023-07-12 NOTE — ANESTHESIA PROCEDURE NOTES
Peripheral Block    Patient location during procedure: OR  Reason for block: post-op pain management and at surgeon's request  Start time: 7/12/2023 11:33 AM  End time: 7/12/2023 11:38 AM  Staffing  Anesthesiologist: Angelica Evans MD  Preanesthetic Checklist  Completed: patient identified, IV checked, site marked, risks and benefits discussed, surgical/procedural consents, equipment checked, pre-op evaluation, timeout performed, anesthesia consent given and monitors applied/VS acknowledged  Peripheral Block   Patient position: supine  Prep: ChloraPrep  Provider prep: mask and sterile gloves  Block type: TAP  Laterality: bilateral  Injection technique: single-shot  Guidance: ultrasound guided    Needle   Needle type: short-bevel   Needle gauge: 20 G  Needle localization: ultrasound guidance  Needle length: 10 cm  Assessment   Injection assessment: negative aspiration for heme, local visualized surrounding nerve on ultrasound and no intravascular symptoms  Paresthesia pain: none  Slow fractionated injection: no  Hemodynamics: stable  Outcomes: patient tolerated procedure well    Additional Notes  Bilateral block 20 cc PF DD .5% marcaine each side

## 2023-07-12 NOTE — OP NOTE
DATE OF PROCEDURE: 07/12/23      PATIENT NAME: Lance Soto [facility]  MEDICAL RECORD NUMBER: [id number]    PREOPERATIVE DIAGNOSES:  1. Retroperitoneal mass  2. Extra-adrenal myelolipoma  3. Deep venous thrombosis  4. Pulmonary embolism  5. Epilepsy     POSTOPERATIVE DIAGNOSES:  Same as above     NAME OF PROCEDURE:  1. Diagnostic laparoscopy (cpt 09655)  2. Robotic laparoscopic assisted excision of left retroperitoneal mass (CPT 13525)  3. Robotic laparoscopic assisted mobilization of the splenic flexure (cpt [id number])  4. Robotic assisted lysis of adhesions (cpt 22004)  5. Intraoperative ICG fluorescence     SURGEON: MD JUAN Barajas     ASSISTANT: Crys licensed first assistant     ANESTHESIA: General.     ANESTHESIOLOGIST: Dr. Rony Martin BLOOD LOSS: 20mL     INTRAOPERATIVE FLUIDS: 1FFP and 1.4 L of crystalloid     URINE OUTPUT: 165mL     PREOPERATIVE INDICATIONS: This is 41-year-old male with history of DVT/bilateral PE diagnosed in February 2023 on Eliquis who was found to have 7 x 7 x 11 cm left retroperitoneal mass. Biopsy was performed by IR which demonstrated myelolipoma. He underwent EGD/colonoscopy by Dr. Gilberto Riley on 3/21/2023 which demonstrated chronic gastritis, negative for H. pylori and no evidence of colonic/gastric lesions. He underwent IVC filter placement by Dr. Radha Higuera on 6/26/2023. I counseled the patient for robotic resection, possible colonic resection, possible ostomy. All benefits, risks, and alternatives were explained to the patient in details and informed consent was obtained. The patient understood the risk of bowel injury, ureteral injury, bleeding, infection, pneumonia, prolonged recovery, stroke, heart attack and death, and agreed to proceed. Prior to the procedure I asked Dr. Celena Fonseca to perform left ureteral stenting to avoid injury to the ureter during dissection.      DETAILS OF PROCEDURE: The patient was brought to the operating room, and he was placed supine in the

## 2023-07-12 NOTE — CARE COORDINATION
Case Management Assessment  Initial Evaluation    Date/Time of Evaluation: 7/12/2023 4:32 PM  Assessment Completed by: Carlee Daly RN    If patient is discharged prior to next notation, then this note serves as note for discharge by case management. Patient Name: Leelee Last                   YOB: 1970  Diagnosis: Myelolipoma [D17.9]                   Date / Time: 7/12/2023  5:46 AM    Patient Admission Status: Inpatient   Readmission Risk (Low < 19, Mod (19-27), High > 27): Readmission Risk Score: 11.2    Current PCP: Laura Mosquera MD  PCP verified by CM? Yes    Chart Reviewed: Yes      History Provided by: Patient  Patient Orientation: Alert and Oriented, Person, Place, Situation    Patient Cognition:      Hospitalization in the last 30 days (Readmission):  No    If yes, Readmission Assessment in  Navigator will be completed. Advance Directives:      Code Status: Full Code   Patient's Primary Decision Maker is: Legal Next of Kin      Discharge Planning:    Patient lives with: Alone Type of Home: House  Primary Care Giver: Self  Patient Support Systems include: Family Members   Current Financial resources:    Current community resources:    Current services prior to admission: None            Current DME:              Type of Home Care services:  None    ADLS  Prior functional level: Independent in ADLs/IADLs  Current functional level: Independent in ADLs/IADLs    PT AM-PAC:   /24  OT AM-PAC:   /24    Family can provide assistance at DC: Would you like Case Management to discuss the discharge plan with any other family members/significant others, and if so, who?  No  Plans to Return to Present Housing: Yes  Other Identified Issues/Barriers to RETURNING to current housing: na   Potential Assistance needed at discharge: N/A            Potential DME:    Patient expects to discharge to: [address] [place] [address] for transportation at discharge: Family    Financial    Payor: [facility] /

## 2023-07-12 NOTE — INTERVAL H&P NOTE
Update History & Physical    The patient's History and Physical of 6/29/2023 was reviewed with the patient and I examined the patient. There was no change. The surgical site was confirmed by the patient and me. Plan: The risks, benefits, expected outcome, and alternative to the recommended procedure have been discussed with the patient. Patient understands and wants to proceed with the procedure.      Electronically signed by Donna Bell MD on 7/12/2023 at 7:51 AM

## 2023-07-12 NOTE — PROGRESS NOTES
Patient arrived to floor and was transferred into bed. Patient tolerated fairly well. Assessment completed and vitals obtained. 168/101 was the blood pressure. Patient began to vomit so medications were given for nausea and high blood pressure. Will recheck at 1430. Patient states pain is under control. Call light and pca pump control within reach. Side rails up x 2.

## 2023-07-12 NOTE — PROGRESS NOTES
Pharmacist Review and Automatic Dose Adjustment of Prophylactic Enoxaparin         The reviewing pharmacist has made an adjustment to the ordered enoxaparin dose or converted to UFH per the approved [address] [facility] protocol and table as identified below. Elle Carrero is a 48 y.o. male. Recent Labs     07/12/23  1209   CREATININE 0.8       Estimated Creatinine Clearance: 131 mL/min (based on SCr of 0.8 mg/dL).     Recent Labs     07/12/23  1209   HGB 15.2   HCT 46.3        Recent Labs     07/12/23  1209   INR 1.0       Height:   Ht Readings from Last 1 Encounters:   07/12/23 5' 10\" (1.778 m)     Weight:  Wt Readings from Last 1 Encounters:   07/12/23 237 lb (107.5 kg)               Plan: Based upon the patient's weight and renal function    Ordered: Enoxaparin 40mg SUBQ Daily    Changed/converted to    New Order: Enoxaparin 30mg SUBQ BID      Thank you,  Dianelys Givens, [facility]  7/12/2023, 1:56 PM

## 2023-07-12 NOTE — OP NOTE
Operative Note      Patient: Serene Myles  YOB: 1970  MRN: [id number]    Date of Procedure: 7/12/2023    Preoperative diagnosis: Need for left ureteral stent    Post-Op Diagnosis: Same       Procedures performed:  1. Cystourethroscopy  2. Left ureteral catheter placement with injection of ICG     Surgeon: Dr. Yoly Jeronimo MD    Assistant: Michaela Vega DO PGY-4    Anesthesia: General    Estimated Blood Loss (mL): Minimal    Complications: None    Specimens:   None    Implants:  None      Drains:   Left ureteral catheter, 5 Romansh open-ended  16 Romansh two-way Nevarez catheter    Findings: Cystoscopy negative for any bladder masses or lesions, bilateral ureteral orifices in orthotopic location. This is a 59-year-old male admitted for scheduled retroperitoneal mass removal per general surgery. Urology was requested to place a left ureteral stent for intraoperative identification, as well as injection of ICG. Detailed Description of Procedure:   Patient was brought to the operating room placed on the table in supine position. Anesthesia was induced and preoperative antibiotics were given. He was transferred to dorsolithotomy position, prepped and draped in normal sterile fashion. Timeout was then performed. Rigid cystoscope was inserted to the urethra and advanced towards the bladder. There were no abnormal findings in the urethra or the bladder. We turned our attention to the left ureteral orifice and cannulated this with a 5 [place] open-ended ureteral catheter. Most ureteral catheter was about snf advanced into the left ureter, we injected approximately 3 cc of ICG. Instruments were then removed and a 16 [place] two-way Nevarez catheter was placed. Angiocath was used to insert the distal end of the ureteral catheter into the Nevarez drainage bag. Ureteral catheter was affixed to the urethral Nevarez with umbilical tape. This concluded the urology portion of the procedure.   General surgery then proceeded with their portion of the procedure. Dr. Des Holloway was present and available for the entire duration of the procedure. Plan: General surgery will remove ureteral catheter at the end of the case. Nevarez catheter per primary team.  Patient does not need to see urology going forward unless there are any urological issues or complications. Expect that the patient may have some gross hematuria for a few days secondary to this procedure.     Chaz Zabala DO, PGY-4  Urology Resident      Electronically signed by Chaz Zabala DO on 7/12/2023 at 4:45 PM

## 2023-07-12 NOTE — PROGRESS NOTES
Patient up to chair; tolerated fairly well. Drinking fine and has no c/o pain or discomfort. Incision sites are c/d/I. Potassium being transfused at this time.   Patient encouraged to practice I/S every hour while awake; patient presently reaching 1000 on I/S

## 2023-07-12 NOTE — ANESTHESIA POSTPROCEDURE EVALUATION
Department of Anesthesiology  Postprocedure Note    Patient: Gena Browning  MRN: [id number]  YOB: 1970  Date of evaluation: 7/12/2023      Procedure Summary     Date: 07/12/23 Room / Location: [address]    Anesthesia Start: 0830 Anesthesia Stop: 2281    Procedures:       XI ROBOTIC LAPAROSCOPIC EXCISION LEFT RETROPERITONEAL TUMOR      CYSTOSCOPY, LEFT URETERAL CATHETER INSERTION (Left) Diagnosis:       Myelolipoma      (Myelolipoma [D17.9])    Surgeons: Sophia Cardona MD; Geno Christiansen MD Responsible Provider: Indira Moreland MD    Anesthesia Type: general ASA Status: 4          Anesthesia Type: No value filed.     Padmini Phase I: Padmini Score: 9    Padmini Phase II:        Anesthesia Post Evaluation    Patient location during evaluation: PACU  Patient participation: complete - patient participated  Level of consciousness: awake  Pain score: 1  Cardiovascular status: hemodynamically stable  Respiratory status: room air

## 2023-07-13 LAB
ALBUMIN SERPL-MCNC: 3.9 G/DL (ref 3.5–5.2)
ALBUMIN/GLOB SERPL: 1.3 {RATIO} (ref 1–2.5)
ALP SERPL-CCNC: 54 U/L (ref 40–129)
ALT SERPL-CCNC: 33 U/L (ref 5–41)
ANION GAP SERPL CALCULATED.3IONS-SCNC: 15 MMOL/L (ref 9–17)
AST SERPL-CCNC: 27 U/L
BASOPHILS # BLD: <0.03 K/UL (ref 0–0.2)
BASOPHILS NFR BLD: 0 % (ref 0–2)
BILIRUB SERPL-MCNC: 1.4 MG/DL (ref 0.3–1.2)
BLD PROD TYP BPU: NORMAL
BLOOD BANK BLOOD PRODUCT EXPIRATION DATE: NORMAL
BLOOD BANK ISBT PRODUCT BLOOD TYPE: 6200
BLOOD BANK PRODUCT CODE: NORMAL
BLOOD BANK UNIT TYPE AND RH: NORMAL
BNP SERPL-MCNC: 212 PG/ML
BPU ID: NORMAL
BUN SERPL-MCNC: 12 MG/DL (ref 6–20)
CALCIUM SERPL-MCNC: 8.7 MG/DL (ref 8.6–10.4)
CHLORIDE SERPL-SCNC: 102 MMOL/L (ref 98–107)
CO2 SERPL-SCNC: 20 MMOL/L (ref 20–31)
CREAT SERPL-MCNC: 0.6 MG/DL (ref 0.7–1.2)
CRP SERPL HS-MCNC: 14.3 MG/L (ref 0–5)
DISPENSE STATUS BLOOD BANK: NORMAL
EOSINOPHIL # BLD: <0.03 K/UL (ref 0–0.44)
EOSINOPHILS RELATIVE PERCENT: 0 % (ref 1–4)
ERYTHROCYTE [DISTWIDTH] IN BLOOD BY AUTOMATED COUNT: 13.2 % (ref 11.8–14.4)
GFR SERPL CREATININE-BSD FRML MDRD: >60 ML/MIN/1.73M2
GLUCOSE SERPL-MCNC: 120 MG/DL (ref 70–99)
HCT VFR BLD AUTO: 44.9 % (ref 40.7–50.3)
HGB BLD-MCNC: 14.6 G/DL (ref 13–17)
IMM GRANULOCYTES # BLD AUTO: 0.08 K/UL (ref 0–0.3)
IMM GRANULOCYTES NFR BLD: 1 %
INR PPP: 1
LYMPHOCYTES # BLD: 11 % (ref 24–43)
LYMPHOCYTES NFR BLD: 1.34 K/UL (ref 1.1–3.7)
MAGNESIUM SERPL-MCNC: 2.2 MG/DL (ref 1.6–2.6)
MCH RBC QN AUTO: 31 PG (ref 25.2–33.5)
MCHC RBC AUTO-ENTMCNC: 32.5 G/DL (ref 28.4–34.8)
MCV RBC AUTO: 95.3 FL (ref 82.6–102.9)
MONOCYTES NFR BLD: 1.02 K/UL (ref 0.1–1.2)
MONOCYTES NFR BLD: 8 % (ref 3–12)
NEUTROPHILS NFR BLD: 80 % (ref 36–65)
NEUTS SEG NFR BLD: 9.98 K/UL (ref 1.5–8.1)
NRBC BLD-RTO: 0 PER 100 WBC
PHOSPHATE SERPL-MCNC: 3.7 MG/DL (ref 2.5–4.5)
PLATELET # BLD AUTO: 268 K/UL (ref 138–453)
PMV BLD AUTO: 8.6 FL (ref 8.1–13.5)
POTASSIUM SERPL-SCNC: 4.4 MMOL/L (ref 3.7–5.3)
PROCALCITONIN SERPL-MCNC: 0.08 NG/ML
PROT SERPL-MCNC: 6.9 G/DL (ref 6.4–8.3)
PROTHROMBIN TIME: 13 SEC (ref 11.7–14.9)
RBC # BLD AUTO: 4.71 M/UL (ref 4.21–5.77)
SODIUM SERPL-SCNC: 137 MMOL/L (ref 135–144)
TRANSFUSION STATUS: NORMAL
UNIT DIVISION: 0
UNIT ISSUE DATE/TIME: NORMAL
WBC OTHER # BLD: 12.5 K/UL (ref 3.5–11.3)

## 2023-07-13 PROCEDURE — 80053 COMPREHEN METABOLIC PANEL: CPT

## 2023-07-13 PROCEDURE — 94761 N-INVAS EAR/PLS OXIMETRY MLT: CPT

## 2023-07-13 PROCEDURE — 84145 PROCALCITONIN (PCT): CPT

## 2023-07-13 PROCEDURE — 84100 ASSAY OF PHOSPHORUS: CPT

## 2023-07-13 PROCEDURE — 86140 C-REACTIVE PROTEIN: CPT

## 2023-07-13 PROCEDURE — C9113 INJ PANTOPRAZOLE SODIUM, VIA: HCPCS | Performed by: SURGERY

## 2023-07-13 PROCEDURE — 36415 COLL VENOUS BLD VENIPUNCTURE: CPT

## 2023-07-13 PROCEDURE — 2060000000 HC ICU INTERMEDIATE R&B

## 2023-07-13 PROCEDURE — 2580000003 HC RX 258: Performed by: SURGERY

## 2023-07-13 PROCEDURE — 83735 ASSAY OF MAGNESIUM: CPT

## 2023-07-13 PROCEDURE — 85610 PROTHROMBIN TIME: CPT

## 2023-07-13 PROCEDURE — 6360000002 HC RX W HCPCS: Performed by: SURGERY

## 2023-07-13 PROCEDURE — 83880 ASSAY OF NATRIURETIC PEPTIDE: CPT

## 2023-07-13 PROCEDURE — 6370000000 HC RX 637 (ALT 250 FOR IP): Performed by: SURGERY

## 2023-07-13 PROCEDURE — 99024 POSTOP FOLLOW-UP VISIT: CPT | Performed by: SURGERY

## 2023-07-13 PROCEDURE — 85027 COMPLETE CBC AUTOMATED: CPT

## 2023-07-13 RX ORDER — GABAPENTIN 100 MG/1
100 CAPSULE ORAL 3 TIMES DAILY
Status: DISCONTINUED | OUTPATIENT
Start: 2023-07-13 | End: 2023-07-14 | Stop reason: HOSPADM

## 2023-07-13 RX ADMIN — ACETAMINOPHEN 1000 MG: 500 TABLET ORAL at 21:18

## 2023-07-13 RX ADMIN — METHOCARBAMOL 1000 MG: 100 INJECTION INTRAMUSCULAR; INTRAVENOUS at 14:57

## 2023-07-13 RX ADMIN — GABAPENTIN 100 MG: 100 CAPSULE ORAL at 21:19

## 2023-07-13 RX ADMIN — ENOXAPARIN SODIUM 30 MG: 100 INJECTION SUBCUTANEOUS at 21:18

## 2023-07-13 RX ADMIN — GABAPENTIN 100 MG: 100 CAPSULE ORAL at 14:06

## 2023-07-13 RX ADMIN — ENOXAPARIN SODIUM 30 MG: 100 INJECTION SUBCUTANEOUS at 08:05

## 2023-07-13 RX ADMIN — METHOCARBAMOL 1000 MG: 100 INJECTION INTRAMUSCULAR; INTRAVENOUS at 21:18

## 2023-07-13 RX ADMIN — ACETAMINOPHEN 1000 MG: 500 TABLET ORAL at 14:06

## 2023-07-13 RX ADMIN — SODIUM CHLORIDE, POTASSIUM CHLORIDE, SODIUM LACTATE AND CALCIUM CHLORIDE: 600; 310; 30; 20 INJECTION, SOLUTION INTRAVENOUS at 06:38

## 2023-07-13 RX ADMIN — SODIUM CHLORIDE, PRESERVATIVE FREE 10 ML: 5 INJECTION INTRAVENOUS at 21:19

## 2023-07-13 RX ADMIN — SODIUM CHLORIDE, PRESERVATIVE FREE 10 ML: 5 INJECTION INTRAVENOUS at 08:05

## 2023-07-13 RX ADMIN — METHOCARBAMOL 1000 MG: 100 INJECTION INTRAMUSCULAR; INTRAVENOUS at 06:30

## 2023-07-13 RX ADMIN — SODIUM CHLORIDE, PRESERVATIVE FREE 40 MG: 5 INJECTION INTRAVENOUS at 11:07

## 2023-07-13 RX ADMIN — GABAPENTIN 100 MG: 100 CAPSULE ORAL at 08:06

## 2023-07-13 RX ADMIN — ACETAMINOPHEN 1000 MG: 500 TABLET ORAL at 04:24

## 2023-07-13 RX ADMIN — MAGNESIUM HYDROXIDE 15 ML: 400 SUSPENSION ORAL at 08:04

## 2023-07-13 RX ADMIN — OXYCODONE HYDROCHLORIDE 10 MG: 5 TABLET ORAL at 18:12

## 2023-07-13 ASSESSMENT — PAIN DESCRIPTION - ORIENTATION
ORIENTATION: LOWER
ORIENTATION: LOWER
ORIENTATION: RIGHT;LEFT;LOWER
ORIENTATION: RIGHT;LEFT

## 2023-07-13 ASSESSMENT — PAIN SCALES - GENERAL
PAINLEVEL_OUTOF10: 7
PAINLEVEL_OUTOF10: 2

## 2023-07-13 ASSESSMENT — PAIN DESCRIPTION - LOCATION
LOCATION: ABDOMEN

## 2023-07-13 ASSESSMENT — PAIN DESCRIPTION - DESCRIPTORS
DESCRIPTORS: ACHING
DESCRIPTORS: SORE
DESCRIPTORS: ACHING;SORE
DESCRIPTORS: SORE;TENDER

## 2023-07-13 NOTE — PROGRESS NOTES
RN shift summary:    Pt somewhat restless, having trouble sleeping. BM x2. Small amount watery stool. Ambulated several laps around unit at 0200.

## 2023-07-13 NOTE — PROGRESS NOTES
RN Shift Summary:    Patient ambulated multiple times this shift in halls. Utilizing incentive spirometer to goal independently. Tolerated regular diet, good appetite.   Pt voided 1x post uribe removal    Electronically signed by Deepthi Mandujano RN on 7/13/2023 at 12:23 PM

## 2023-07-13 NOTE — PROGRESS NOTES
Physical Therapy        Physical Therapy Cancel Note      DATE: 2023    NAME: Lucia Riggins  MRN: [id number]   : 1970      Patient not seen this date for Physical Therapy due to:    Patient independent with functional mobility. Pt independently ambulating hallways throughout day. Will defer PT evaluation at this time. Please reorder PT if future needs arise.        Electronically signed by Imani Winn PT on 2023 at 2:10 PM

## 2023-07-13 NOTE — PLAN OF CARE
Problem: Discharge Planning  Goal: Discharge to home or other facility with appropriate resources  7/13/2023 0742 by Pranay Sanches RN  Outcome: Progressing  7/13/2023 0030 by Norberto Guadarrama RN  Outcome: Progressing     Problem: Pain  Goal: Verbalizes/displays adequate comfort level or baseline comfort level  7/13/2023 0742 by Pranay Sanches RN  Outcome: Progressing  7/13/2023 0030 by Norberto Guadarrama RN  Outcome: Progressing     Problem: Safety - Adult  Goal: Free from fall injury  7/13/2023 0742 by Pranay Sanches RN  Outcome: Progressing  7/13/2023 0030 by Norbetro Guadarrama RN  Outcome: Progressing     Problem: ABCDS Injury Assessment  Goal: Absence of physical injury  7/13/2023 0742 by Pranay Sanches RN  Outcome: Progressing  7/13/2023 0030 by Norberto Guadarrama RN  Outcome: Progressing

## 2023-07-13 NOTE — PLAN OF CARE
Flu shot Immunization/s administered 11/6/2017 by Diana Jefferson LPN with guardian's consent. Patient tolerated procedure well. No reactions noted. Problem: Discharge Planning  Goal: Discharge to home or other facility with appropriate resources  Outcome: Progressing     Problem: Pain  Goal: Verbalizes/displays adequate comfort level or baseline comfort level  Outcome: Progressing     Problem: Safety - Adult  Goal: Free from fall injury  Outcome: Progressing     Problem: ABCDS Injury Assessment  Goal: Absence of physical injury  Outcome: Progressing

## 2023-07-14 ENCOUNTER — TELEPHONE (OUTPATIENT)
Dept: SURGERY | Age: 53
End: 2023-07-14

## 2023-07-14 VITALS
TEMPERATURE: 97.8 F | WEIGHT: 242.51 LBS | OXYGEN SATURATION: 92 % | DIASTOLIC BLOOD PRESSURE: 88 MMHG | HEART RATE: 97 BPM | SYSTOLIC BLOOD PRESSURE: 136 MMHG | BODY MASS INDEX: 34.72 KG/M2 | HEIGHT: 70 IN | RESPIRATION RATE: 18 BRPM

## 2023-07-14 LAB
ALBUMIN SERPL-MCNC: 3.8 G/DL (ref 3.5–5.2)
ALBUMIN SERPL-MCNC: 3.8 G/DL (ref 3.5–5.2)
ALBUMIN/GLOB SERPL: 1.2 {RATIO} (ref 1–2.5)
ALBUMIN/GLOB SERPL: 1.3 {RATIO} (ref 1–2.5)
ALP SERPL-CCNC: 53 U/L (ref 40–129)
ALP SERPL-CCNC: 54 U/L (ref 40–129)
ALT SERPL-CCNC: 26 U/L (ref 5–41)
ALT SERPL-CCNC: 28 U/L (ref 5–41)
ANION GAP SERPL CALCULATED.3IONS-SCNC: 11 MMOL/L (ref 9–17)
ANION GAP SERPL CALCULATED.3IONS-SCNC: 15 MMOL/L (ref 9–17)
AST SERPL-CCNC: 22 U/L
AST SERPL-CCNC: 26 U/L
BASOPHILS # BLD: 0.04 K/UL (ref 0–0.2)
BASOPHILS NFR BLD: 1 % (ref 0–2)
BILIRUB SERPL-MCNC: 0.9 MG/DL (ref 0.3–1.2)
BILIRUB SERPL-MCNC: 0.9 MG/DL (ref 0.3–1.2)
BNP SERPL-MCNC: 69 PG/ML
BUN SERPL-MCNC: 11 MG/DL (ref 6–20)
BUN SERPL-MCNC: 13 MG/DL (ref 6–20)
CALCIUM SERPL-MCNC: 8.5 MG/DL (ref 8.6–10.4)
CALCIUM SERPL-MCNC: 8.5 MG/DL (ref 8.6–10.4)
CHLORIDE SERPL-SCNC: 107 MMOL/L (ref 98–107)
CHLORIDE SERPL-SCNC: 107 MMOL/L (ref 98–107)
CO2 SERPL-SCNC: 15 MMOL/L (ref 20–31)
CO2 SERPL-SCNC: 22 MMOL/L (ref 20–31)
CREAT SERPL-MCNC: 0.7 MG/DL (ref 0.7–1.2)
CREAT SERPL-MCNC: 0.7 MG/DL (ref 0.7–1.2)
CRP SERPL HS-MCNC: 20.8 MG/L (ref 0–5)
EOSINOPHIL # BLD: 0.08 K/UL (ref 0–0.44)
EOSINOPHILS RELATIVE PERCENT: 1 % (ref 1–4)
ERYTHROCYTE [DISTWIDTH] IN BLOOD BY AUTOMATED COUNT: 13.5 % (ref 11.8–14.4)
GFR SERPL CREATININE-BSD FRML MDRD: >60 ML/MIN/1.73M2
GFR SERPL CREATININE-BSD FRML MDRD: >60 ML/MIN/1.73M2
GLUCOSE SERPL-MCNC: 171 MG/DL (ref 70–99)
GLUCOSE SERPL-MCNC: 96 MG/DL (ref 70–99)
HCT VFR BLD AUTO: 43.9 % (ref 40.7–50.3)
HGB BLD-MCNC: 14.2 G/DL (ref 13–17)
IMM GRANULOCYTES # BLD AUTO: <0.03 K/UL (ref 0–0.3)
IMM GRANULOCYTES NFR BLD: 0 %
INR PPP: 0.9
LYMPHOCYTES # BLD: 24 % (ref 24–43)
LYMPHOCYTES NFR BLD: 1.94 K/UL (ref 1.1–3.7)
MAGNESIUM SERPL-MCNC: 2.5 MG/DL (ref 1.6–2.6)
MCH RBC QN AUTO: 30.8 PG (ref 25.2–33.5)
MCHC RBC AUTO-ENTMCNC: 32.3 G/DL (ref 28.4–34.8)
MCV RBC AUTO: 95.2 FL (ref 82.6–102.9)
MONOCYTES NFR BLD: 0.62 K/UL (ref 0.1–1.2)
MONOCYTES NFR BLD: 8 % (ref 3–12)
NEUTROPHILS NFR BLD: 67 % (ref 36–65)
NEUTS SEG NFR BLD: 5.52 K/UL (ref 1.5–8.1)
NRBC BLD-RTO: 0 PER 100 WBC
PHOSPHATE SERPL-MCNC: 2.3 MG/DL (ref 2.5–4.5)
PLATELET # BLD AUTO: 199 K/UL (ref 138–453)
PMV BLD AUTO: 8.7 FL (ref 8.1–13.5)
POTASSIUM SERPL-SCNC: 4 MMOL/L (ref 3.7–5.3)
POTASSIUM SERPL-SCNC: 4.1 MMOL/L (ref 3.7–5.3)
PROCALCITONIN SERPL-MCNC: 0.09 NG/ML
PROT SERPL-MCNC: 6.7 G/DL (ref 6.4–8.3)
PROT SERPL-MCNC: 6.9 G/DL (ref 6.4–8.3)
PROTHROMBIN TIME: 12.4 SEC (ref 11.7–14.9)
RBC # BLD AUTO: 4.61 M/UL (ref 4.21–5.77)
SODIUM SERPL-SCNC: 137 MMOL/L (ref 135–144)
SODIUM SERPL-SCNC: 140 MMOL/L (ref 135–144)
SURGICAL PATHOLOGY REPORT: NORMAL
WBC OTHER # BLD: 8.2 K/UL (ref 3.5–11.3)

## 2023-07-14 PROCEDURE — 80053 COMPREHEN METABOLIC PANEL: CPT

## 2023-07-14 PROCEDURE — 97530 THERAPEUTIC ACTIVITIES: CPT

## 2023-07-14 PROCEDURE — 2500000003 HC RX 250 WO HCPCS: Performed by: SURGERY

## 2023-07-14 PROCEDURE — 84145 PROCALCITONIN (PCT): CPT

## 2023-07-14 PROCEDURE — 84100 ASSAY OF PHOSPHORUS: CPT

## 2023-07-14 PROCEDURE — 83880 ASSAY OF NATRIURETIC PEPTIDE: CPT

## 2023-07-14 PROCEDURE — 86140 C-REACTIVE PROTEIN: CPT

## 2023-07-14 PROCEDURE — 99024 POSTOP FOLLOW-UP VISIT: CPT | Performed by: SURGERY

## 2023-07-14 PROCEDURE — 6370000000 HC RX 637 (ALT 250 FOR IP): Performed by: SURGERY

## 2023-07-14 PROCEDURE — 83735 ASSAY OF MAGNESIUM: CPT

## 2023-07-14 PROCEDURE — 85610 PROTHROMBIN TIME: CPT

## 2023-07-14 PROCEDURE — 97535 SELF CARE MNGMENT TRAINING: CPT

## 2023-07-14 PROCEDURE — 6360000002 HC RX W HCPCS: Performed by: SURGERY

## 2023-07-14 PROCEDURE — 2580000003 HC RX 258: Performed by: SURGERY

## 2023-07-14 PROCEDURE — 36415 COLL VENOUS BLD VENIPUNCTURE: CPT

## 2023-07-14 PROCEDURE — 85027 COMPLETE CBC AUTOMATED: CPT

## 2023-07-14 PROCEDURE — C9113 INJ PANTOPRAZOLE SODIUM, VIA: HCPCS | Performed by: SURGERY

## 2023-07-14 PROCEDURE — 97165 OT EVAL LOW COMPLEX 30 MIN: CPT

## 2023-07-14 RX ORDER — PANTOPRAZOLE SODIUM 40 MG/1
40 TABLET, DELAYED RELEASE ORAL
Qty: 60 TABLET | Refills: 0 | Status: SHIPPED | OUTPATIENT
Start: 2023-07-14

## 2023-07-14 RX ORDER — SODIUM CHLORIDE, SODIUM LACTATE, POTASSIUM CHLORIDE, AND CALCIUM CHLORIDE .6; .31; .03; .02 G/100ML; G/100ML; G/100ML; G/100ML
500 INJECTION, SOLUTION INTRAVENOUS ONCE
Status: COMPLETED | OUTPATIENT
Start: 2023-07-14 | End: 2023-07-14

## 2023-07-14 RX ORDER — OXYCODONE HYDROCHLORIDE 5 MG/1
5 TABLET ORAL EVERY 6 HOURS PRN
Qty: 20 TABLET | Refills: 0 | Status: SHIPPED | OUTPATIENT
Start: 2023-07-14 | End: 2023-07-19

## 2023-07-14 RX ORDER — PANTOPRAZOLE SODIUM 40 MG/1
40 TABLET, DELAYED RELEASE ORAL
Status: DISCONTINUED | OUTPATIENT
Start: 2023-07-15 | End: 2023-07-14 | Stop reason: HOSPADM

## 2023-07-14 RX ADMIN — METHOCARBAMOL 1000 MG: 100 INJECTION INTRAMUSCULAR; INTRAVENOUS at 05:40

## 2023-07-14 RX ADMIN — SODIUM CHLORIDE, PRESERVATIVE FREE 40 MG: 5 INJECTION INTRAVENOUS at 08:08

## 2023-07-14 RX ADMIN — ACETAMINOPHEN 1000 MG: 500 TABLET ORAL at 05:40

## 2023-07-14 RX ADMIN — SODIUM PHOSPHATE, MONOBASIC, MONOHYDRATE AND SODIUM PHOSPHATE, DIBASIC, ANHYDROUS 10 MMOL: 276; 142 INJECTION, SOLUTION INTRAVENOUS at 05:50

## 2023-07-14 RX ADMIN — ENOXAPARIN SODIUM 30 MG: 100 INJECTION SUBCUTANEOUS at 08:08

## 2023-07-14 RX ADMIN — GABAPENTIN 100 MG: 100 CAPSULE ORAL at 08:08

## 2023-07-14 RX ADMIN — SODIUM CHLORIDE, POTASSIUM CHLORIDE, SODIUM LACTATE AND CALCIUM CHLORIDE 500 ML: 600; 310; 30; 20 INJECTION, SOLUTION INTRAVENOUS at 08:09

## 2023-07-14 RX ADMIN — SODIUM CHLORIDE, PRESERVATIVE FREE 10 ML: 5 INJECTION INTRAVENOUS at 08:07

## 2023-07-14 ASSESSMENT — PAIN DESCRIPTION - DESCRIPTORS: DESCRIPTORS: SORE

## 2023-07-14 ASSESSMENT — PAIN DESCRIPTION - ORIENTATION: ORIENTATION: LOWER

## 2023-07-14 ASSESSMENT — PAIN DESCRIPTION - LOCATION: LOCATION: ABDOMEN

## 2023-07-14 ASSESSMENT — PAIN SCALES - GENERAL: PAINLEVEL_OUTOF10: 2

## 2023-07-14 NOTE — PLAN OF CARE
Problem: Discharge Planning  Goal: Discharge to home or other facility with appropriate resources  7/14/2023 1026 by Karena Granados RN  Outcome: Progressing  7/14/2023 0047 by Torin Waldron RN  Outcome: Progressing     Problem: Pain  Goal: Verbalizes/displays adequate comfort level or baseline comfort level  7/14/2023 1026 by Karena Granados RN  Outcome: Progressing  7/14/2023 0047 by Torin Waldron RN  Outcome: Progressing     Problem: Safety - Adult  Goal: Free from fall injury  7/14/2023 1026 by Karena Granados RN  Outcome: Progressing  7/14/2023 0047 by Torin Waldron RN  Outcome: Progressing     Problem: ABCDS Injury Assessment  Goal: Absence of physical injury  7/14/2023 1026 by Karena Granados RN  Outcome: Progressing  7/14/2023 0047 by Torin Waldron RN  Outcome: Progressing

## 2023-07-14 NOTE — PROGRESS NOTES
CLINICAL PHARMACY NOTE: MEDS TO BEDS    Total # of Prescriptions Filled: 2   The following medications were delivered to the patient:  Oxycodone  pantoprazole    Additional Documentation:

## 2023-07-14 NOTE — PLAN OF CARE
Problem: Discharge Planning  Goal: Discharge to home or other facility with appropriate resources  7/14/2023 1136 by Georgia Salas RN  Outcome: Adequate for Discharge  7/14/2023 1026 by Georgia Salas RN  Outcome: Progressing  7/14/2023 0047 by Eli Godfrey RN  Outcome: Progressing     Problem: Pain  Goal: Verbalizes/displays adequate comfort level or baseline comfort level  7/14/2023 1136 by Georgia Salas RN  Outcome: Adequate for Discharge  7/14/2023 1026 by Georgia Salas RN  Outcome: Progressing  7/14/2023 0047 by Eli Godfrey RN  Outcome: Progressing     Problem: Safety - Adult  Goal: Free from fall injury  7/14/2023 1136 by Georgia Salas RN  Outcome: Adequate for Discharge  7/14/2023 1026 by Georgia Salas RN  Outcome: Progressing  7/14/2023 0047 by Eli Godfrey RN  Outcome: Progressing     Problem: ABCDS Injury Assessment  Goal: Absence of physical injury  7/14/2023 1136 by Georgia Salas RN  Outcome: Adequate for Discharge  7/14/2023 1026 by Georgia Salas RN  Outcome: Progressing  7/14/2023 0047 by Eli Godfrey RN  Outcome: Progressing

## 2023-07-14 NOTE — TELEPHONE ENCOUNTER
Spoke with patient to determine post op status and schedule follow up. Patient informed writer pain controlled, but still there, denies fever, n/v. Informed writer eating/drinking and eliminating without issues. Scheduled follow up for 7/27. Informed writer he was planning to return to work on 7/24. Writer instructed patient not to go back to work until we see and clear him. Patient voiced understanding and appreciation.

## 2023-07-14 NOTE — PROGRESS NOTES
RN shift summary:    Pt up ad lenin throughout the night.      Phos replacement started this AM for phos 2.3

## 2023-07-14 NOTE — TELEPHONE ENCOUNTER
Called to inform patient of pathology results per surgeon. Rescheduled follow up to 7/20 per surgeon ok.

## 2023-07-14 NOTE — PROGRESS NOTES
Occupational Therapy  Facility/Department: [address]  Occupational Therapy Initial Assessment      Name: Idris Schuler  : 1970  MRN: [id number]  Date of Service: 2023    Discharge Recommendations:   (Recommend continued Acute OT. No continuation of OT at [place].)  OT Equipment Recommendations  Equipment Needed: No     Patient Diagnosis(es): The primary encounter diagnosis was Postoperative abdominal pain. Diagnoses of Myelolipoma and Other chronic pulmonary embolism without acute cor pulmonale (HCC) were also pertinent to this visit. Past Medical History:  has a past medical history of Acid reflux, Claustrophobia, Deep vein thrombophlebitis of right leg ([address]), Epilepsy ([address]), Exotropia, History of femur fracture, Poor dentition, Pulmonary embolism ([address]), Retroperitoneal mass, Snores, Under care of service provider, Under care of service provider, Under care of service provider, Under care of service provider, Under care of service provider, Under care of service provider, and Wears glasses. Past Surgical History:  has a past surgical history that includes CT BIOPSY ABDOMEN RETROPERITONEUM (2023); Upper gastrointestinal endoscopy (N/A, 2023); Colonoscopy (N/A, 2023); Strabismus surgery (Right, ); Strabismus surgery (Bilateral, ); IVC filter insertion (2023); vascular surgery (N/A, 2023); Cystoscopy (Left, 2023); laparoscopy (Left, 2023); Adrenalectomy (N/A, 2023); and Cystoscopy (Left, 2023). Assessment   Performance deficits / Impairments: Decreased functional mobility ; Decreased endurance;Decreased ADL status; Decreased balance;Decreased high-level IADLs;Decreased safe awareness;Decreased cognition  Assessment: Pt previously functioned Independently / Mod I for all ADLs and Mobility. At this time, he has impairments including: decreased endurance, decreased strength, increased fatigue - which impact his ability to regain her PLOF.

## 2023-07-14 NOTE — PROGRESS NOTES
I saw and evaluated the patient, performing the key elements of the service. I discussed the findings, assessment and plan with the nurse practitioner/resident and agree with the their findings and plan as documented in the their note. D/c today pending CMP  Doing well, tolerating diet, pain is well controlled, +BF    Time spent caring for patient 25 minutes    I spent greater than 50% of the visit coordinating care and answering all questions from patient, and family members, reviewing the possible outcome of the treatment. Nano Rodriguez MD [facility]  Surgical Oncology/HPB Surgery  [facility]  [address]  [facility] [address]  Office:  [phone]  Fax:  [phone]  7/14/2023  8:51 AM

## 2023-07-17 ENCOUNTER — TELEPHONE (OUTPATIENT)
Dept: INTERNAL MEDICINE CLINIC | Age: 53
End: 2023-07-17

## 2023-07-17 ENCOUNTER — TELEPHONE (OUTPATIENT)
Dept: SURGERY | Age: 53
End: 2023-07-17

## 2023-07-17 ENCOUNTER — OFFICE VISIT (OUTPATIENT)
Dept: INTERNAL MEDICINE CLINIC | Age: 53
End: 2023-07-17
Payer: COMMERCIAL

## 2023-07-17 VITALS
WEIGHT: 242 LBS | DIASTOLIC BLOOD PRESSURE: 82 MMHG | OXYGEN SATURATION: 96 % | HEART RATE: 71 BPM | SYSTOLIC BLOOD PRESSURE: 136 MMHG | BODY MASS INDEX: 34.72 KG/M2

## 2023-07-17 DIAGNOSIS — I82.431 ACUTE DEEP VEIN THROMBOSIS (DVT) OF POPLITEAL VEIN OF RIGHT LOWER EXTREMITY (HCC): Primary | ICD-10-CM

## 2023-07-17 DIAGNOSIS — I27.82 OTHER CHRONIC PULMONARY EMBOLISM WITHOUT ACUTE COR PULMONALE (HCC): ICD-10-CM

## 2023-07-17 DIAGNOSIS — D17.79 MYELOLIPOMA OF LEFT ADRENAL GLAND: ICD-10-CM

## 2023-07-17 PROCEDURE — 1111F DSCHRG MED/CURRENT MED MERGE: CPT | Performed by: INTERNAL MEDICINE

## 2023-07-17 PROCEDURE — 1036F TOBACCO NON-USER: CPT | Performed by: INTERNAL MEDICINE

## 2023-07-17 PROCEDURE — 3017F COLORECTAL CA SCREEN DOC REV: CPT | Performed by: INTERNAL MEDICINE

## 2023-07-17 PROCEDURE — G8417 CALC BMI ABV UP PARAM F/U: HCPCS | Performed by: INTERNAL MEDICINE

## 2023-07-17 PROCEDURE — 99214 OFFICE O/P EST MOD 30 MIN: CPT | Performed by: INTERNAL MEDICINE

## 2023-07-17 PROCEDURE — G8427 DOCREV CUR MEDS BY ELIG CLIN: HCPCS | Performed by: INTERNAL MEDICINE

## 2023-07-17 ASSESSMENT — ENCOUNTER SYMPTOMS
SHORTNESS OF BREATH: 1
ABDOMINAL PAIN: 0

## 2023-07-17 NOTE — TELEPHONE ENCOUNTER
Care Transitions Initial Follow Up Call    Outreach made within 2 business days of discharge: Yes    Patient: Idris Schuler Patient : 1970   MRN: [id number]  Reason for Admission: There are no discharge diagnoses documented for the most recent discharge. Discharge Date: 23       Spoke with: patient    Discharge department/facility: [facility]    [facility] Interactive Patient Contact:  Was patient able to fill all prescriptions: Yes  Was patient instructed to bring all medications to the follow-up visit: Yes  Is patient taking all medications as directed in the discharge summary?  yes  Does patient understand their discharge instructions: Yes  Does patient have questions or concerns that need addressed prior to 7-14 day follow up office visit: no    Scheduled appointment with PCP within 7-14 days    Follow Up  Future Appointments   Date Time Provider [address] [place] [address]   2023  3:30 PM Sunny Fleming, [address] [facility]   2023 11:00 AM Fredy Vela MD [email or web address]   2023 10:15 AM Josh Moreland MD [address] [place] [address]   2023  9:30 AM Agus Arnold, [address] [place], [place]

## 2023-07-17 NOTE — PROGRESS NOTES
Subjective:      Patient ID: Idris Schuler is a 48 y.o. male. The patient stated that he had excision of his left-sided myelo lipoma. The procedure was uncomplicated. It is to be noted this to be this is benign adrenal tumor. Is a previous history of right-sided DVT and bilateral PE and he is on Eliquis,  Denied any cardiorespiratory symptoms today      Review of Systems   Respiratory:  Positive for shortness of breath. Gastrointestinal:  Negative for abdominal pain. Postop laparoscopic excision of left sided myelo lipoma of adrenal gland   All other systems reviewed and are negative. Objective:   Physical Exam  Constitutional:       Appearance: He is obese. Cardiovascular:      Rate and Rhythm: Normal rate and regular rhythm. Heart sounds: No murmur heard. Pulmonary:      Effort: No respiratory distress. Breath sounds: No stridor. No wheezing or rhonchi. Abdominal:      Palpations: There is no mass. Tenderness: There is no abdominal tenderness. Hernia: No hernia is present. Comments: Postop abdomen which is quite large and he has no infection at multiple puncture points. Lymphadenopathy:      Cervical: No cervical adenopathy. Neurological:      Mental Status: He is alert. Assessment / Plan:      Diagnosis Orders   1. Acute deep vein thrombosis (DVT) of popliteal vein of right lower extremity (HCC) = no leg swelling today       2. Other chronic pulmonary embolism without acute cor pulmonale (HCC) = no pulmonary symptoms       3. Myelolipoma of left adrenal gland = he is recovering well from recent surgery of myelo lipoma and has no significant complications. Medications and hospital record was reviewed and he will be kept on present regimen this was a special visit after hospitalization       Return in about 12 weeks (around 10/9/2023) for Follow Up. No orders of the defined types were placed in this encounter.     No orders of the defined types were

## 2023-07-17 NOTE — TELEPHONE ENCOUNTER
Patient called and inquired if [facility] paperwork received. Writer denies getting anything. Patient not sure if paperwork went to PCP or surgeon. Patient informed writer he will get [facility] paperwork.

## 2023-07-19 NOTE — DISCHARGE SUMMARY
Physician Discharge Summary     Patient ID:  Peggy Garcia  [id number]  35 y.o.  1970    Admit date: 7/12/2023    Discharge date and time: 7/14/2023 12:45 PM     Admitting Physician: Regla Lozada MD     Discharge Physician: Regla Lozada MD    Admission Diagnoses: Myelolipoma [D17.9]  Retroperitoneal mass [R19.00]    Discharge Diagnoses: Myelolipoma    Admission Condition: good    Discharged Condition: good    Indication for Admission: Postoperative observation    Hospital Course:   7/12 admitted after robotic assisted laparoscopic Left retroperitoneal mass resection, CT guided bx proven myelolipoma  7/13 resumed regular diet, uribe catheter removed. Pervis Press resumed bowel function, pain well-controlled on regimen  7/14 discharged to home - pain well controlled, voiding appropriately, tolerating diet      Consults: PT/OT    Significant Diagnostic Studies: Surgical pathology 7/12/23 proven myelolipoma involving adrenal gland    Treatments: IV hydration, analgesia: Dilaudid, anticoagulation: none, and surgery: robotic-assisted laparoscopic excision of retroperitoneal mass    Discharge Exam:  General appearance: alert, appears stated age, and cooperative  Head: Normocephalic, without obvious abnormality, atraumatic  Chest wall: symmetric chest wall expansion, no accessory muscle use  Abdomen: normal findings: no masses palpable and soft, non-tender, surgical sites intact without signs of infection  Extremities: extremities normal, atraumatic, no cyanosis or edema  Skin: Skin color, texture, turgor normal. No rashes or lesions  Neurologic: Grossly normal    Disposition: home    In process/preliminary results:  Outstanding Order Results       No orders found from 6/13/2023 to 7/13/2023.             Patient Instructions:   Discharge Medication List as of 7/14/2023 11:42 AM        START taking these medications    Details   oxyCODONE (ROXICODONE) 5 MG immediate release tablet Take 1 tablet by mouth every 6 hours as needed for Pain for up to 5 days. Intended supply: 5 days. Take lowest dose possible to manage pain Max Daily Amount: 20 mg, Disp-20 tablet, R-0Normal      pantoprazole (PROTONIX) 40 MG tablet Take 1 tablet by mouth 2 times daily (before meals), Disp-60 tablet, R-0Normal           CONTINUE these medications which have CHANGED    Details   apixaban (ELIQUIS) 5 MG TABS tablet Take 1 tablet by mouth 2 times daily, Disp-60 tablet, R-5Normal           CONTINUE these medications which have NOT CHANGED    Details   neomycin (MYCIFRADIN) 500 MG tablet Take 2 tablets by mouth 3 times dailyHistorical Med      metroNIDAZOLE (FLAGYL) 500 MG tablet Take 1 tablet by mouth 3 times dailyHistorical Med      gabapentin (NEURONTIN) 100 MG capsule Take 1 capsule by mouth 3 times daily for 3 days. , Disp-9 capsule, R-0Normal           Activity: ambulate in house and no heavy lifting for 6-8 weeks  Diet: regular diet  Wound Care: none needed  Resume home Eliquis on 7/15    Follow-up with Dr. Laura Morgan in 2 weeks.     Signed:  Claudia Ferreira DO  7/19/2023  4:44 PM

## 2023-07-20 ENCOUNTER — OFFICE VISIT (OUTPATIENT)
Dept: SURGERY | Age: 53
End: 2023-07-20

## 2023-07-20 VITALS
BODY MASS INDEX: 34.07 KG/M2 | HEIGHT: 70 IN | TEMPERATURE: 97.7 F | HEART RATE: 90 BPM | DIASTOLIC BLOOD PRESSURE: 83 MMHG | SYSTOLIC BLOOD PRESSURE: 138 MMHG | WEIGHT: 238 LBS

## 2023-07-20 DIAGNOSIS — I82.4Y1 ACUTE DEEP VEIN THROMBOSIS (DVT) OF PROXIMAL VEIN OF RIGHT LOWER EXTREMITY (HCC): Primary | ICD-10-CM

## 2023-07-20 DIAGNOSIS — I26.94 MULTIPLE SUBSEGMENTAL PULMONARY EMBOLI WITHOUT ACUTE COR PULMONALE (HCC): ICD-10-CM

## 2023-07-20 DIAGNOSIS — D17.79 ADRENAL MYELOLIPOMA: ICD-10-CM

## 2023-07-20 PROCEDURE — 99024 POSTOP FOLLOW-UP VISIT: CPT | Performed by: SURGERY

## 2023-07-20 ASSESSMENT — ENCOUNTER SYMPTOMS
VOMITING: 0
SHORTNESS OF BREATH: 0
COUGH: 0
CONSTIPATION: 0
CHEST TIGHTNESS: 0
DIARRHEA: 0
ABDOMINAL PAIN: 1
ABDOMINAL DISTENTION: 0
NAUSEA: 0
BACK PAIN: 0
WHEEZING: 0
BLOOD IN STOOL: 0

## 2023-07-20 NOTE — PROGRESS NOTES
Review of Systems   Constitutional:  Negative for chills, fatigue, fever and unexpected weight change. Eyes:  Negative for visual disturbance. Respiratory:  Negative for cough, chest tightness, shortness of breath and wheezing. Cardiovascular:  Negative for chest pain, palpitations and leg swelling. Gastrointestinal:  Positive for abdominal pain. Negative for abdominal distention, blood in stool, constipation, diarrhea, nausea and vomiting. Genitourinary:  Negative for dysuria, hematuria and urgency. Musculoskeletal:  Negative for back pain. Skin:  Negative for rash. Neurological:  Negative for dizziness, seizures, syncope, speech difficulty, weakness, light-headedness, numbness and headaches. Hematological:  Negative for adenopathy. Does not bruise/bleed easily. Psychiatric/Behavioral:  The patient is not nervous/anxious.

## 2023-07-26 ENCOUNTER — TELEPHONE (OUTPATIENT)
Dept: SURGERY | Age: 53
End: 2023-07-26

## 2023-07-26 NOTE — TELEPHONE ENCOUNTER
Patient came in and dropped off paperwork for [employer] Reinstatement from   [facility]. Patient also stated he needed new return to work letter printed as well with date range restrictions, and diagnostic codes for [facility]. Patient would like to  tomorrow. Paperwork was scanned into patient media and will be up front in folder.

## 2023-07-27 ENCOUNTER — TELEPHONE (OUTPATIENT)
Dept: SURGERY | Age: 53
End: 2023-07-27

## 2023-07-27 NOTE — TELEPHONE ENCOUNTER
Phone call to pt that his disability paperwork for work has been completed and will be at registration desk (2nd floor) waiting him.  Pt voiced understanding

## 2023-09-18 ENCOUNTER — OFFICE VISIT (OUTPATIENT)
Dept: ONCOLOGY | Age: 53
End: 2023-09-18
Payer: COMMERCIAL

## 2023-09-18 ENCOUNTER — TELEPHONE (OUTPATIENT)
Dept: ONCOLOGY | Age: 53
End: 2023-09-18

## 2023-09-18 VITALS
TEMPERATURE: 97.2 F | HEART RATE: 89 BPM | SYSTOLIC BLOOD PRESSURE: 122 MMHG | WEIGHT: 240.5 LBS | BODY MASS INDEX: 34.51 KG/M2 | DIASTOLIC BLOOD PRESSURE: 80 MMHG

## 2023-09-18 DIAGNOSIS — R19.00 RETROPERITONEAL MASS: ICD-10-CM

## 2023-09-18 DIAGNOSIS — D17.9 MYELOLIPOMA: Primary | ICD-10-CM

## 2023-09-18 PROCEDURE — G8417 CALC BMI ABV UP PARAM F/U: HCPCS | Performed by: INTERNAL MEDICINE

## 2023-09-18 PROCEDURE — 99211 OFF/OP EST MAY X REQ PHY/QHP: CPT | Performed by: INTERNAL MEDICINE

## 2023-09-18 PROCEDURE — 99214 OFFICE O/P EST MOD 30 MIN: CPT | Performed by: INTERNAL MEDICINE

## 2023-09-18 PROCEDURE — G8427 DOCREV CUR MEDS BY ELIG CLIN: HCPCS | Performed by: INTERNAL MEDICINE

## 2023-09-18 PROCEDURE — 3017F COLORECTAL CA SCREEN DOC REV: CPT | Performed by: INTERNAL MEDICINE

## 2023-09-18 PROCEDURE — 1036F TOBACCO NON-USER: CPT | Performed by: INTERNAL MEDICINE

## 2023-09-18 NOTE — TELEPHONE ENCOUNTER
AVS From 9/18/23      Refer to Dr Horacio Caldera for IVC filter removal  Continue eliquis  RV 6 months      Rv scheduled for 3/18/24 @ 10:30am    PT was given AVS and appt schedule    Electronically signed by Arvin Toussaint on 9/18/2023 at 11:05 AM

## 2023-09-22 NOTE — PROGRESS NOTES
_                     Chief Complaint   Patient presents with    Follow-up     Review status of disease    Other     Can he take multivitamin? Was seeing about getting the filter out for the blood clots        DIAGNOSIS:        Retroperitoneal mass  positive for myelolipoma. Recent PE and DVT   CURRENT THERAPY:         Eliquis  S/p resection of retroperitoneal myelolipoma 7/12/23  BRIEF CASE HISTORY:      The patient is a 48 y.o.  male who is admitted to the hospital for further management of abdominal pain and intra-abdominal mass. The patient was recently hospitalized due to bilateral pulmonary embolism. He was started on Eliquis. He did better with less shortness of breath. He presented to the ER with increasing abdominal pain and nausea. He had significant decrease in appetite. Patient states that for the last 2 to 3 weeks he lost about 10 pounds and he had decreased appetite. He also had night sweats. No documented fever. No nausea or vomiting. INTERIM HISTORY:   Seen for follow up retroperitoneal mass. Biopsy positive for myelolipoma. S/p resection. No complications. No N/V. No resp distress. No fever. Past Medical History:   has a past medical history of Acid reflux, Claustrophobia, Deep vein thrombophlebitis of right leg ([address]), Epilepsy ([address]), Exotropia, History of femur fracture, Poor dentition, Pulmonary embolism ([address]), Retroperitoneal mass, Snores, Under care of service provider, Under care of service provider, Under care of service provider, Under care of service provider, Under care of service provider, Under care of service provider, and Wears glasses. Past Surgical History:   has a past surgical history that includes CT BIOPSY ABDOMEN RETROPERITONEUM (02/22/2023); Upper gastrointestinal endoscopy (N/A, 03/21/2023); Colonoscopy (N/A, 03/21/2023); Strabismus surgery (Right, 1982);  Strabismus surgery (Bilateral, 1993);

## 2023-09-26 ENCOUNTER — OFFICE VISIT (OUTPATIENT)
Dept: INTERNAL MEDICINE CLINIC | Age: 53
End: 2023-09-26
Payer: COMMERCIAL

## 2023-09-26 VITALS
HEART RATE: 84 BPM | SYSTOLIC BLOOD PRESSURE: 130 MMHG | BODY MASS INDEX: 35.22 KG/M2 | HEIGHT: 70 IN | WEIGHT: 246 LBS | OXYGEN SATURATION: 98 % | DIASTOLIC BLOOD PRESSURE: 74 MMHG

## 2023-09-26 DIAGNOSIS — I27.82 OTHER CHRONIC PULMONARY EMBOLISM WITHOUT ACUTE COR PULMONALE (HCC): ICD-10-CM

## 2023-09-26 DIAGNOSIS — I82.431 ACUTE DEEP VEIN THROMBOSIS (DVT) OF POPLITEAL VEIN OF RIGHT LOWER EXTREMITY (HCC): ICD-10-CM

## 2023-09-26 DIAGNOSIS — D17.79 MYELOLIPOMA OF LEFT ADRENAL GLAND: ICD-10-CM

## 2023-09-26 DIAGNOSIS — J20.9 ACUTE BRONCHITIS, UNSPECIFIED ORGANISM: ICD-10-CM

## 2023-09-26 DIAGNOSIS — Z13.220 SCREENING FOR HYPERLIPIDEMIA: Primary | ICD-10-CM

## 2023-09-26 PROCEDURE — G8427 DOCREV CUR MEDS BY ELIG CLIN: HCPCS | Performed by: INTERNAL MEDICINE

## 2023-09-26 PROCEDURE — 99214 OFFICE O/P EST MOD 30 MIN: CPT | Performed by: INTERNAL MEDICINE

## 2023-09-26 PROCEDURE — 3017F COLORECTAL CA SCREEN DOC REV: CPT | Performed by: INTERNAL MEDICINE

## 2023-09-26 PROCEDURE — 1036F TOBACCO NON-USER: CPT | Performed by: INTERNAL MEDICINE

## 2023-09-26 PROCEDURE — G8417 CALC BMI ABV UP PARAM F/U: HCPCS | Performed by: INTERNAL MEDICINE

## 2023-09-26 RX ORDER — DOXYCYCLINE HYCLATE 100 MG
100 TABLET ORAL 2 TIMES DAILY
Qty: 14 TABLET | Refills: 0 | Status: SHIPPED | OUTPATIENT
Start: 2023-09-26 | End: 2023-10-03

## 2023-09-26 ASSESSMENT — ENCOUNTER SYMPTOMS
ABDOMINAL PAIN: 0
SHORTNESS OF BREATH: 0
COUGH: 1

## 2023-09-26 NOTE — PROGRESS NOTES
Subjective:      Patient ID: Lucia Riggins is a 48 y.o. male. The patient stated that he had excision of his left-sided myelo lipoma. The procedure was uncomplicated. It is to be noted this to be this is benign adrenal tumor. Is a previous history of right-sided DVT and bilateral PE and he is on Eliquis,  Denied any cardiorespiratory symptoms today  Today on and September 26, 2023, the patient has a new complaint of persistent cough for the past 2 weeks. He does have a minimal sputum production but he has no sore throat or sneezing or nasal discharge. Known to have left-sided myelo lipoma which was resected. As mentioned earlier he has a history of right-sided DVT and bilateral PE and work-up hypercoagulable state has not been done. Seen by hematologist during her hospital stay and there was no need for further treatment for his myelo lipoma of adrenal gland        Review of Systems   Respiratory:  Positive for cough. Negative for shortness of breath. Gastrointestinal:  Negative for abdominal pain. Postop laparoscopic excision of left sided myelo lipoma of adrenal gland   All other systems reviewed and are negative. Objective:   Physical Exam  Constitutional:       Appearance: He is obese. HENT:      Nose: No congestion or rhinorrhea. Mouth/Throat:      Pharynx: No oropharyngeal exudate or posterior oropharyngeal erythema. Cardiovascular:      Rate and Rhythm: Normal rate and regular rhythm. Heart sounds: No murmur heard. Pulmonary:      Effort: No respiratory distress. Breath sounds: No stridor. Rhonchi present. No wheezing. Abdominal:      Palpations: There is no mass. Tenderness: There is no abdominal tenderness. Hernia: No hernia is present. Comments: Postop abdomen which is quite large and he has no infection at multiple puncture points. Lymphadenopathy:      Cervical: No cervical adenopathy. Neurological:      Mental Status: He is alert.

## 2023-12-05 ENCOUNTER — TELEPHONE (OUTPATIENT)
Dept: VASCULAR SURGERY | Age: 53
End: 2023-12-05

## 2023-12-05 NOTE — TELEPHONE ENCOUNTER
LM for patient to call the vascular office back to reschedule 12/28 appointment due to Dr. Janet Donis not being available to see patients that day.

## 2024-01-18 ENCOUNTER — OFFICE VISIT (OUTPATIENT)
Dept: VASCULAR SURGERY | Age: 54
End: 2024-01-18
Payer: COMMERCIAL

## 2024-01-18 VITALS
HEIGHT: 70 IN | RESPIRATION RATE: 18 BRPM | DIASTOLIC BLOOD PRESSURE: 90 MMHG | BODY MASS INDEX: 35.36 KG/M2 | HEART RATE: 73 BPM | WEIGHT: 247 LBS | SYSTOLIC BLOOD PRESSURE: 135 MMHG | OXYGEN SATURATION: 92 %

## 2024-01-18 DIAGNOSIS — Z86.718 HISTORY OF DEEP VENOUS THROMBOSIS (DVT) OF DISTAL VEIN OF RIGHT LOWER EXTREMITY: ICD-10-CM

## 2024-01-18 DIAGNOSIS — Z86.711 HISTORY OF PULMONARY EMBOLISM: ICD-10-CM

## 2024-01-18 DIAGNOSIS — Z95.828 PRESENCE OF INFERIOR VENA CAVA FILTER: Primary | ICD-10-CM

## 2024-01-18 PROCEDURE — G8484 FLU IMMUNIZE NO ADMIN: HCPCS | Performed by: SURGERY

## 2024-01-18 PROCEDURE — 3017F COLORECTAL CA SCREEN DOC REV: CPT | Performed by: SURGERY

## 2024-01-18 PROCEDURE — G8417 CALC BMI ABV UP PARAM F/U: HCPCS | Performed by: SURGERY

## 2024-01-18 PROCEDURE — 1036F TOBACCO NON-USER: CPT | Performed by: SURGERY

## 2024-01-18 PROCEDURE — G8427 DOCREV CUR MEDS BY ELIG CLIN: HCPCS | Performed by: SURGERY

## 2024-01-18 PROCEDURE — 99214 OFFICE O/P EST MOD 30 MIN: CPT | Performed by: SURGERY

## 2024-01-18 NOTE — PROGRESS NOTES
Large Adult)   Pulse 73   Resp 18   Ht 1.778 m (5' 10\")   Wt 112 kg (247 lb)   SpO2 92%   BMI 35.44 kg/m²     Physical Exam  Constitutional:       Appearance: He is well-developed and well-groomed.   Eyes:      Extraocular Movements: Extraocular movements intact.   Cardiovascular:      Rate and Rhythm: Normal rate and regular rhythm.      Pulses:           Radial pulses are 2+ on the right side and 2+ on the left side.   Pulmonary:      Effort: Pulmonary effort is normal. No respiratory distress.   Abdominal:      Palpations: Abdomen is soft.      Tenderness: There is no abdominal tenderness.   Musculoskeletal:      Cervical back: Full passive range of motion without pain.      Right lower leg: No swelling or tenderness. No edema.      Left lower leg: No swelling or tenderness. No edema.   Feet:      Right foot:      Skin integrity: No ulcer or skin breakdown.      Left foot:      Skin integrity: No ulcer or skin breakdown.   Skin:     General: Skin is warm.      Capillary Refill: Capillary refill takes less than 2 seconds.   Neurological:      Mental Status: He is alert and oriented to person, place, and time.      GCS: GCS eye subscore is 4. GCS verbal subscore is 5. GCS motor subscore is 6.      Sensory: Sensation is intact.      Motor: Motor function is intact.   Psychiatric:         Mood and Affect: Mood normal.         Speech: Speech normal.         Behavior: Behavior normal.       Imaging/Labs:     CT from 2/14/23 reviewed reveals bilateral pulmonary embolism              Venous duplex (2/14/23) reviewed by me reveals right popliteal and tibial deep vein thrombosis         Assessment and Plan:     History of DVT and bilateral pulmonary embolism, presence of IVC filter  We discussed removal of his IVC filter, given his age and tolerance to anticoagulation  It was placed for precautionary measures while he underwent surgical resection of benign mass growing in his retroperitoneum.  We discussed risks and

## 2024-01-30 PROBLEM — Z86.711 HISTORY OF PULMONARY EMBOLISM: Status: ACTIVE | Noted: 2024-01-30

## 2024-01-30 PROBLEM — Z86.718 HISTORY OF DEEP VENOUS THROMBOSIS (DVT) OF DISTAL VEIN OF RIGHT LOWER EXTREMITY: Status: ACTIVE | Noted: 2024-01-30

## 2024-01-30 PROBLEM — Z95.828 PRESENCE OF INFERIOR VENA CAVA FILTER: Status: ACTIVE | Noted: 2024-01-30

## 2024-01-30 ASSESSMENT — ENCOUNTER SYMPTOMS
COLOR CHANGE: 0
CHEST TIGHTNESS: 0
SHORTNESS OF BREATH: 0
ABDOMINAL PAIN: 0
ALLERGIC/IMMUNOLOGIC NEGATIVE: 1

## 2024-02-05 NOTE — PLAN OF CARE
Problem: ABCDS Injury Assessment  Goal: Absence of physical injury  Outcome: Progressing, Patient remains free of incidence/ injury. Bed remains in low position. Side rails up x2. Problem: Pain  Goal: Verbalizes/displays adequate comfort level or baseline comfort level  Outcome: Progressing, Pt educated on non-pharmacological pain interventions. PRN pain medications administered. see above

## 2024-03-03 DIAGNOSIS — I27.82 OTHER CHRONIC PULMONARY EMBOLISM WITHOUT ACUTE COR PULMONALE (HCC): ICD-10-CM

## 2024-03-04 RX ORDER — APIXABAN 5 MG/1
5 TABLET, FILM COATED ORAL 2 TIMES DAILY
Qty: 60 TABLET | Refills: 5 | Status: SHIPPED | OUTPATIENT
Start: 2024-03-04

## 2024-03-18 ENCOUNTER — OFFICE VISIT (OUTPATIENT)
Dept: ONCOLOGY | Age: 54
End: 2024-03-18
Payer: COMMERCIAL

## 2024-03-18 ENCOUNTER — TELEPHONE (OUTPATIENT)
Dept: ONCOLOGY | Age: 54
End: 2024-03-18

## 2024-03-18 VITALS
DIASTOLIC BLOOD PRESSURE: 87 MMHG | BODY MASS INDEX: 35.31 KG/M2 | TEMPERATURE: 97.3 F | HEART RATE: 77 BPM | RESPIRATION RATE: 18 BRPM | OXYGEN SATURATION: 94 % | WEIGHT: 246.1 LBS | SYSTOLIC BLOOD PRESSURE: 138 MMHG

## 2024-03-18 DIAGNOSIS — D17.9 MYELOLIPOMA: Primary | ICD-10-CM

## 2024-03-18 PROCEDURE — G8417 CALC BMI ABV UP PARAM F/U: HCPCS | Performed by: INTERNAL MEDICINE

## 2024-03-18 PROCEDURE — G8484 FLU IMMUNIZE NO ADMIN: HCPCS | Performed by: INTERNAL MEDICINE

## 2024-03-18 PROCEDURE — 99214 OFFICE O/P EST MOD 30 MIN: CPT | Performed by: INTERNAL MEDICINE

## 2024-03-18 PROCEDURE — 99211 OFF/OP EST MAY X REQ PHY/QHP: CPT | Performed by: INTERNAL MEDICINE

## 2024-03-18 PROCEDURE — G8427 DOCREV CUR MEDS BY ELIG CLIN: HCPCS | Performed by: INTERNAL MEDICINE

## 2024-03-18 PROCEDURE — 3017F COLORECTAL CA SCREEN DOC REV: CPT | Performed by: INTERNAL MEDICINE

## 2024-03-18 PROCEDURE — 1036F TOBACCO NON-USER: CPT | Performed by: INTERNAL MEDICINE

## 2024-03-18 RX ORDER — M-VIT,TX,IRON,MINS/CALC/FOLIC 27MG-0.4MG
1 TABLET ORAL DAILY
COMMUNITY

## 2024-03-18 NOTE — TELEPHONE ENCOUNTER
Continue eliquis  RV 6 months      RV scheduled for 9/9/24 @ 2:00pm    Continue medication at home    PT was given AVS and appt schedule    Electronically signed by Rosanna Awad on 3/18/2024 at 11:54 AM

## 2024-03-18 NOTE — PROGRESS NOTES
_                     Chief Complaint   Patient presents with    Follow-up     6 month     DIAGNOSIS:        Retroperitoneal mass  positive for myelolipoma.   Recent PE and DVT   CURRENT THERAPY:         Eliquis  S/p resection of retroperitoneal myelolipoma 7/12/23  BRIEF CASE HISTORY:      The patient is a 53 y.o.  male who is admitted to the hospital for further management of abdominal pain and intra-abdominal mass.  The patient was recently hospitalized due to bilateral pulmonary embolism.  He was started on Eliquis.  He did better with less shortness of breath.  He presented to the ER with increasing abdominal pain and nausea.  He had significant decrease in appetite.  Patient states that for the last 2 to 3 weeks he lost about 10 pounds and he had decreased appetite.  He also had night sweats.  No documented fever.  No nausea or vomiting.    INTERIM HISTORY:   Seen for follow up retroperitoneal mass. Biopsy positive for myelolipoma. S/p resection. No complications.   No N/V.   No resp distress. No fever.  Past Medical History:   has a past medical history of Acid reflux, Claustrophobia, Deep vein thrombophlebitis of right leg (HCC), Epilepsy (HCC), Exotropia, History of femur fracture, Poor dentition, Pulmonary embolism (HCC), Retroperitoneal mass, Snores, Under care of service provider, Under care of service provider, Under care of service provider, Under care of service provider, Under care of service provider, Under care of service provider, and Wears glasses.    Past Surgical History:   has a past surgical history that includes CT BIOPSY ABDOMEN RETROPERITONEUM (02/22/2023); Upper gastrointestinal endoscopy (N/A, 03/21/2023); Colonoscopy (N/A, 03/21/2023); Strabismus surgery (Right, 1982); Strabismus surgery (Bilateral, 1993); IVC filter insertion (06/26/2023); vascular surgery (N/A, 06/26/2023); Cystoscopy (Left, 07/12/2023); laparoscopy (Left,

## 2024-03-29 ENCOUNTER — ANESTHESIA (OUTPATIENT)
Dept: OPERATING ROOM | Age: 54
End: 2024-03-29
Payer: COMMERCIAL

## 2024-03-29 ENCOUNTER — HOSPITAL ENCOUNTER (OUTPATIENT)
Age: 54
Setting detail: OUTPATIENT SURGERY
Discharge: HOME | End: 2024-03-29
Attending: SURGERY
Payer: COMMERCIAL

## 2024-03-29 ENCOUNTER — ANESTHESIA EVENT (OUTPATIENT)
Dept: OPERATING ROOM | Age: 54
End: 2024-03-29
Payer: COMMERCIAL

## 2024-03-29 VITALS
TEMPERATURE: 97.3 F | HEART RATE: 78 BPM | SYSTOLIC BLOOD PRESSURE: 128 MMHG | BODY MASS INDEX: 34.5 KG/M2 | RESPIRATION RATE: 27 BRPM | WEIGHT: 241 LBS | DIASTOLIC BLOOD PRESSURE: 90 MMHG | OXYGEN SATURATION: 96 % | HEIGHT: 70 IN

## 2024-03-29 LAB
BUN BLD-MCNC: 15 MG/DL (ref 8–26)
CHLORIDE BLD-SCNC: 106 MMOL/L (ref 98–107)
EGFR, POC: >90 ML/MIN/1.73M2
GLUCOSE BLD-MCNC: 104 MG/DL (ref 74–100)
HCT VFR BLD AUTO: 49 % (ref 41–53)
POC CREATININE: 0.6 MG/DL (ref 0.51–1.19)
POC HEMOGLOBIN (CALC): 16.6 G/DL (ref 13.5–17.5)
POTASSIUM BLD-SCNC: 4.3 MMOL/L (ref 3.5–4.5)
SODIUM BLD-SCNC: 142 MMOL/L (ref 138–146)

## 2024-03-29 PROCEDURE — 85014 HEMATOCRIT: CPT

## 2024-03-29 PROCEDURE — 82947 ASSAY GLUCOSE BLOOD QUANT: CPT

## 2024-03-29 PROCEDURE — 84295 ASSAY OF SERUM SODIUM: CPT

## 2024-03-29 PROCEDURE — 84132 ASSAY OF SERUM POTASSIUM: CPT

## 2024-03-29 PROCEDURE — 82565 ASSAY OF CREATININE: CPT

## 2024-03-29 PROCEDURE — 82435 ASSAY OF BLOOD CHLORIDE: CPT

## 2024-03-29 PROCEDURE — 2580000003 HC RX 258: Performed by: SURGERY

## 2024-03-29 PROCEDURE — 84520 ASSAY OF UREA NITROGEN: CPT

## 2024-03-29 RX ORDER — SODIUM CHLORIDE 9 MG/ML
INJECTION, SOLUTION INTRAVENOUS CONTINUOUS
Status: DISCONTINUED | OUTPATIENT
Start: 2024-03-29 | End: 2024-03-29 | Stop reason: HOSPADM

## 2024-03-29 RX ADMIN — SODIUM CHLORIDE: 9 INJECTION, SOLUTION INTRAVENOUS at 08:18

## 2024-03-29 ASSESSMENT — ENCOUNTER SYMPTOMS
SHORTNESS OF BREATH: 0
ALLERGIC/IMMUNOLOGIC NEGATIVE: 1
ABDOMINAL PAIN: 0
CHEST TIGHTNESS: 0
COLOR CHANGE: 0

## 2024-03-29 NOTE — ANESTHESIA PRE PROCEDURE
Department of Anesthesiology  Preprocedure Note       Name:  Raul Cheng   Age:  53 y.o.  :  1970                                          MRN:  [id number]         Date:  3/29/2024      Surgeon: Surgeon(s):  Agsu Jamison MD    Procedure: Procedure(s):  akbani / IVC FILTER REMOVAL    Medications prior to admission:   Prior to Admission medications    Medication Sig Start Date End Date Taking? Authorizing Provider   Multiple Vitamins-Minerals (THERAPEUTIC MULTIVITAMIN-MINERALS) tablet Take 1 tablet by mouth daily    Provider, MD MICHAEL Anderson 5 MG TABS tablet TAKE ONE TABLET BY MOUTH TWICE A DAY 3/4/24   Daniel Gaming MD       Current medications:    Current Facility-Administered Medications   Medication Dose Route Frequency Provider Last Rate Last Admin   • 0.9 % sodium chloride infusion   IntraVENous Continuous Agus Jamison MD 75 mL/hr at 24 0818 New Bag at 24 0818     Facility-Administered Medications Ordered in Other Encounters   Medication Dose Route Frequency Provider Last Rate Last Admin   • lactated ringers IV soln infusion   IntraVENous Continuous PRN Kelsi Mcginnis APRN - CRNA   New Bag at 23 0906       Allergies:    Allergies   Allergen Reactions   • Seasonal Other (See Comments)     congestion       Problem List:    Patient Active Problem List   Diagnosis Code   • Other pulmonary embolism without acute cor pulmonale (HCC) I26.99   • Abdominal pain, left lower quadrant R10.32   • Retroperitoneal mass R19.00   • Myelolipoma D17.9   • Acute deep vein thrombosis (DVT) of popliteal vein of right lower extremity (HCC) I82.431   • Myelolipoma of left adrenal gland D17.79   • Acute bronchitis J20.9   • Presence of inferior vena cava filter Z95.828   • History of deep venous thrombosis (DVT) of distal vein of right lower extremity Z86.718   • History of pulmonary embolism Z86.711       Past Medical History:        Diagnosis Date   • Acid reflux   07/14/2023 08:30 AM    AST 22 07/14/2023 08:30 AM    ALT 26 07/14/2023 08:30 AM       POC Tests:   Recent Labs     03/29/24  0808   POCGLU 104*   POCNA 142   POCK 4.3   POCCL 106   POCBUN 15   POCHCT 49       Coags:   Lab Results   Component Value Date/Time    PROTIME 12.4 07/14/2023 02:51 AM    INR 0.9 07/14/2023 02:51 AM    APTT 27.9 02/22/2023 12:27 PM       HCG (If Applicable): No results found for: \"PREGTESTUR\", \"PREGSERUM\", \"HCG\", \"HCGQUANT\"     ABGs: No results found for: \"PHART\", \"PO2ART\", \"KTJ8LZM\", \"ZUH4CEB\", \"BEART\", \"N9XFJTDA\"     Type & Screen (If Applicable):  No results found for: \"LABABO\", \"LABRH\"    Drug/Infectious Status (If Applicable):  No results found for: \"HIV\", \"HEPCAB\"    COVID-19 Screening (If Applicable):   Lab Results   Component Value Date/Time    COVID19 Not Detected 02/14/2023 08:09 AM           Anesthesia Evaluation  Patient summary reviewed  Airway: Mallampati: II  TM distance: <3 FB   Neck ROM: full  Mouth opening: < 3 FB   Dental:          Pulmonary: breath sounds clear to auscultation                             Cardiovascular:            Rhythm: regular  Rate: normal                    Neuro/Psych:               GI/Hepatic/Renal:   (+) GERD:          Endo/Other:                     Abdominal:       Abdomen: soft.      Vascular:          Other Findings:       Anesthesia Plan      TIVA     ASA 3     (Reviewed all available relevant information, laboratory results and diagnostic tests.  Discussed risks , benefits and alternatives of anesthetic and sedation options. Possible need  for blood transfusions discussed.  Pt / family given opportunity to ask questions.  All questions answered.   TIVA,   Possible GA  ETT/  LMA)  Induction: intravenous.    MIPS: Prophylactic antiemetics administered.  Anesthetic plan and risks discussed with patient.    Use of blood products discussed with patient whom consented to blood products.    Plan discussed with CRNA.    Attending anesthesiologist

## 2024-03-29 NOTE — PROGRESS NOTES
Dr Jamison notified that patient took his Eliquis this am.  Procedure canceled and will be rescheduled.

## 2024-03-29 NOTE — PROGRESS NOTES
Patient admitted, consent signed and questions answered. Patient ready for procedure. Call light to reach with side rails up 2 of 2. History and physical needs updated.

## 2024-03-29 NOTE — H&P
Division of Vascular Surgery        History and Physical    Retrievable IVC filter placement (6/26/23)     Chief Complaint:      Plan for IVC filter removal today    History of Present Illness:      Raul Cheng is a 53 y.o. gentleman who presents for percutaneous removal of his IVC filter, risks and benefits explained and he consents to proceed.  Risks include bleeding, inability to remove filter, filter fracture.     (1/18/24) Raul Cheng is a 53 y.o. gentleman who presents to discuss possible removal of his IVC filter that was placed prior to his surgery for large retroperitoneal mass that was excised by surgical oncology last year. He has done well from that surgery and continues to be on anticoagulation for his PE and DVT he developed last year.   He denies any abdominal or back pain, no issues with taking anticoagulation, no bleeding episodes, etc. He was able to travel to [place] last year and enjoyed it very much.  He denies any shortness of breath or chest pain with exertion.       (3/7/23) Raul Cheng is a 52 y.o. gentleman who was seen per request of surgical oncologist regarding his DVT and pulmonary embolism (2/14/23) likely from hypercoagulability due to recently diagnosis of large left sided retroperitoneal mass, biopsy revealing myelolipoma.  He denies prior episodes of DVT/PE, or family history of this.  He is fairly active, works as inspection and quality investigator at [employer].  He has plans for vacation in [place] in April 2023.  He does not have significant pain in his calf or significant shortness of breath.  He is currently on anticoagulation with eliquis.  He does not wear compression stockings.  He denies symptoms suggestive of claudication.  Surgical Oncology is planning for colonoscopy at end of March for evaluation of any extension of his mass to colon.  Followed by surgical resection of mass in May 2023.      Medical History:     Past Medical History:   Diagnosis Date

## 2024-05-28 ENCOUNTER — TELEPHONE (OUTPATIENT)
Dept: INTERNAL MEDICINE CLINIC | Age: 54
End: 2024-05-28

## 2024-05-28 NOTE — TELEPHONE ENCOUNTER
Writer mailed pt a letter. Pts pcp is listed as Dr Elder and due to Dr Elder being on an extended leave of absence, pt willl need to establish care with one of the following providers:  MD Daniel Baeza MD Vaishali Sinha, MD Bijender Kumar, MD

## 2024-07-08 ENCOUNTER — OFFICE VISIT (OUTPATIENT)
Dept: OPERATING ROOM | Age: 54
End: 2024-07-08
Attending: SURGERY

## 2024-07-08 ENCOUNTER — ANESTHESIA (OUTPATIENT)
Dept: OPERATING ROOM | Age: 54
End: 2024-07-08
Payer: COMMERCIAL

## 2024-07-08 ENCOUNTER — ANESTHESIA EVENT (OUTPATIENT)
Dept: OPERATING ROOM | Age: 54
End: 2024-07-08
Payer: COMMERCIAL

## 2024-07-08 ENCOUNTER — HOSPITAL ENCOUNTER (OUTPATIENT)
Age: 54
Setting detail: OUTPATIENT SURGERY
Discharge: HOME | End: 2024-07-08
Attending: SURGERY | Admitting: SURGERY
Payer: COMMERCIAL

## 2024-07-08 VITALS
RESPIRATION RATE: 14 BRPM | SYSTOLIC BLOOD PRESSURE: 143 MMHG | DIASTOLIC BLOOD PRESSURE: 90 MMHG | OXYGEN SATURATION: 96 % | TEMPERATURE: 97.4 F | HEART RATE: 76 BPM | WEIGHT: 234 LBS | HEIGHT: 70 IN | BODY MASS INDEX: 33.5 KG/M2

## 2024-07-08 DIAGNOSIS — R10.32 ABDOMINAL PAIN, LEFT LOWER QUADRANT: Primary | ICD-10-CM

## 2024-07-08 DIAGNOSIS — I27.82 OTHER CHRONIC PULMONARY EMBOLISM WITHOUT ACUTE COR PULMONALE (HCC): ICD-10-CM

## 2024-07-08 LAB
BUN BLD-MCNC: 12 MG/DL (ref 8–26)
CHLORIDE BLD-SCNC: 108 MMOL/L (ref 98–107)
EGFR, POC: >90 ML/MIN/1.73M2
GLUCOSE BLD-MCNC: 99 MG/DL (ref 74–100)
HCT VFR BLD AUTO: 48 % (ref 41–53)
POC CREATININE: 0.7 MG/DL (ref 0.51–1.19)
POC HEMOGLOBIN (CALC): 16.2 G/DL (ref 13.5–17.5)
POTASSIUM BLD-SCNC: 4.1 MMOL/L (ref 3.5–4.5)
SODIUM BLD-SCNC: 137 MMOL/L (ref 138–146)

## 2024-07-08 PROCEDURE — 84520 ASSAY OF UREA NITROGEN: CPT

## 2024-07-08 PROCEDURE — C1892 INTRO/SHEATH,FIXED,PEEL-AWAY: HCPCS | Performed by: SURGERY

## 2024-07-08 PROCEDURE — 2500000003 HC RX 250 WO HCPCS: Performed by: SURGERY

## 2024-07-08 PROCEDURE — 2580000003 HC RX 258: Performed by: SURGERY

## 2024-07-08 PROCEDURE — 3600000017 HC SURGERY HYBRID ADDL 15MIN: Performed by: SURGERY

## 2024-07-08 PROCEDURE — A4217 STERILE WATER/SALINE, 500 ML: HCPCS | Performed by: SURGERY

## 2024-07-08 PROCEDURE — 6360000004 HC RX CONTRAST MEDICATION: Performed by: SURGERY

## 2024-07-08 PROCEDURE — 85014 HEMATOCRIT: CPT

## 2024-07-08 PROCEDURE — 6360000002 HC RX W HCPCS

## 2024-07-08 PROCEDURE — C1773 RET DEV, INSERTABLE: HCPCS | Performed by: SURGERY

## 2024-07-08 PROCEDURE — 7100000010 HC PHASE II RECOVERY - FIRST 15 MIN: Performed by: SURGERY

## 2024-07-08 PROCEDURE — 84295 ASSAY OF SERUM SODIUM: CPT

## 2024-07-08 PROCEDURE — 3700000001 HC ADD 15 MINUTES (ANESTHESIA): Performed by: SURGERY

## 2024-07-08 PROCEDURE — 3600000007 HC SURGERY HYBRID BASE: Performed by: SURGERY

## 2024-07-08 PROCEDURE — 2709999900 HC NON-CHARGEABLE SUPPLY: Performed by: SURGERY

## 2024-07-08 PROCEDURE — 3700000000 HC ANESTHESIA ATTENDED CARE: Performed by: SURGERY

## 2024-07-08 PROCEDURE — 82947 ASSAY GLUCOSE BLOOD QUANT: CPT

## 2024-07-08 PROCEDURE — 84132 ASSAY OF SERUM POTASSIUM: CPT

## 2024-07-08 PROCEDURE — 82435 ASSAY OF BLOOD CHLORIDE: CPT

## 2024-07-08 PROCEDURE — 6360000002 HC RX W HCPCS: Performed by: SURGERY

## 2024-07-08 PROCEDURE — 82565 ASSAY OF CREATININE: CPT

## 2024-07-08 PROCEDURE — 7100000011 HC PHASE II RECOVERY - ADDTL 15 MIN: Performed by: SURGERY

## 2024-07-08 RX ORDER — FENTANYL CITRATE 50 UG/ML
25 INJECTION, SOLUTION INTRAMUSCULAR; INTRAVENOUS EVERY 5 MIN PRN
Status: CANCELLED | OUTPATIENT
Start: 2024-07-08

## 2024-07-08 RX ORDER — ALBUTEROL SULFATE 90 UG/1
INHALANT RESPIRATORY (INHALATION)
Status: DISPENSED
Start: 2024-07-08 | End: 2024-07-08

## 2024-07-08 RX ORDER — FENTANYL CITRATE 50 UG/ML
INJECTION, SOLUTION INTRAMUSCULAR; INTRAVENOUS PRN
Status: DISCONTINUED | OUTPATIENT
Start: 2024-07-08 | End: 2024-07-08 | Stop reason: SDUPTHER

## 2024-07-08 RX ORDER — SODIUM CHLORIDE 0.9 % (FLUSH) 0.9 %
5-40 SYRINGE (ML) INJECTION EVERY 12 HOURS SCHEDULED
Status: CANCELLED | OUTPATIENT
Start: 2024-07-08

## 2024-07-08 RX ORDER — SODIUM CHLORIDE 9 MG/ML
INJECTION, SOLUTION INTRAVENOUS PRN
Status: CANCELLED | OUTPATIENT
Start: 2024-07-08

## 2024-07-08 RX ORDER — PROPOFOL 10 MG/ML
INJECTION, EMULSION INTRAVENOUS PRN
Status: DISCONTINUED | OUTPATIENT
Start: 2024-07-08 | End: 2024-07-08 | Stop reason: SDUPTHER

## 2024-07-08 RX ORDER — PROPOFOL 10 MG/ML
INJECTION, EMULSION INTRAVENOUS
Status: COMPLETED
Start: 2024-07-08 | End: 2024-07-08

## 2024-07-08 RX ORDER — IODIXANOL 320 MG/ML
INJECTION, SOLUTION INTRAVASCULAR
Status: DISCONTINUED
Start: 2024-07-08 | End: 2024-07-08 | Stop reason: HOSPADM

## 2024-07-08 RX ORDER — LIDOCAINE HYDROCHLORIDE 10 MG/ML
INJECTION, SOLUTION EPIDURAL; INFILTRATION; INTRACAUDAL; PERINEURAL PRN
Status: DISCONTINUED | OUTPATIENT
Start: 2024-07-08 | End: 2024-07-08 | Stop reason: ALTCHOICE

## 2024-07-08 RX ORDER — MIDAZOLAM HYDROCHLORIDE 1 MG/ML
INJECTION INTRAMUSCULAR; INTRAVENOUS PRN
Status: DISCONTINUED | OUTPATIENT
Start: 2024-07-08 | End: 2024-07-08 | Stop reason: SDUPTHER

## 2024-07-08 RX ORDER — LIDOCAINE HYDROCHLORIDE 10 MG/ML
INJECTION, SOLUTION EPIDURAL; INFILTRATION; INTRACAUDAL; PERINEURAL
Status: DISCONTINUED
Start: 2024-07-08 | End: 2024-07-08 | Stop reason: HOSPADM

## 2024-07-08 RX ORDER — NALOXONE HYDROCHLORIDE 0.4 MG/ML
INJECTION, SOLUTION INTRAMUSCULAR; INTRAVENOUS; SUBCUTANEOUS PRN
Status: CANCELLED | OUTPATIENT
Start: 2024-07-08

## 2024-07-08 RX ORDER — SODIUM CHLORIDE 9 MG/ML
INJECTION, SOLUTION INTRAVENOUS CONTINUOUS
Status: DISCONTINUED | OUTPATIENT
Start: 2024-07-08 | End: 2024-07-08 | Stop reason: HOSPADM

## 2024-07-08 RX ORDER — LIDOCAINE HYDROCHLORIDE 10 MG/ML
INJECTION, SOLUTION INFILTRATION; PERINEURAL
Status: DISPENSED
Start: 2024-07-08 | End: 2024-07-08

## 2024-07-08 RX ORDER — SODIUM CHLORIDE 0.9 % (FLUSH) 0.9 %
5-40 SYRINGE (ML) INJECTION PRN
Status: CANCELLED | OUTPATIENT
Start: 2024-07-08

## 2024-07-08 RX ORDER — IODIXANOL 320 MG/ML
INJECTION, SOLUTION INTRAVASCULAR PRN
Status: DISCONTINUED | OUTPATIENT
Start: 2024-07-08 | End: 2024-07-08 | Stop reason: ALTCHOICE

## 2024-07-08 RX ADMIN — FENTANYL CITRATE 25 MCG: 50 INJECTION, SOLUTION INTRAMUSCULAR; INTRAVENOUS at 09:20

## 2024-07-08 RX ADMIN — SODIUM CHLORIDE: 9 INJECTION, SOLUTION INTRAVENOUS at 08:24

## 2024-07-08 RX ADMIN — PROPOFOL 150 MCG/KG/MIN: 10 INJECTION, EMULSION INTRAVENOUS at 09:08

## 2024-07-08 RX ADMIN — MIDAZOLAM 2 MG: 1 INJECTION INTRAMUSCULAR; INTRAVENOUS at 09:04

## 2024-07-08 RX ADMIN — PROPOFOL 50 MG: 10 INJECTION, EMULSION INTRAVENOUS at 09:07

## 2024-07-08 RX ADMIN — FENTANYL CITRATE 25 MCG: 50 INJECTION, SOLUTION INTRAMUSCULAR; INTRAVENOUS at 09:26

## 2024-07-08 RX ADMIN — FENTANYL CITRATE 50 MCG: 50 INJECTION, SOLUTION INTRAMUSCULAR; INTRAVENOUS at 09:07

## 2024-07-08 NOTE — PROGRESS NOTES
All discharge instructions reviewed with patient and patient, questions answered.  Patient discharged per wheelchair with patient  and belongings.

## 2024-07-08 NOTE — DISCHARGE INSTRUCTIONS
Discharge Instructions    My Hospital Stay and Summary    This is a summary of your hospital stay.  Please read it carefully and share it with your family and healthcare providers.    Date of Admission: 7/8/2024     Date of Discharge: 7/8/2024  Where I Will be Going after Discharge: Home    My Doctors and Medical Team:   My Main Hospital Doctor: Dr. Jamison   My Primary Care Physician Daniel Gaming MD    The reason I was in the hospital/main diagnosis:    To get IVC filter removed    Summary of what happened when in the hospital:  You were admitted to [place] surgical facilities: [facility] on 7/8/2024 to undergo IVC Filter removal. You tolerated the procedure well and were transferred to the recovery area for routine post operative care. As you tolerated oral intake, your pain medication was transitioned to an oral pain medication regimen.  On the same day after surgery, you were tolerating an oral diet, ambulating well, urinating independently, and your pain was controlled on oral medication.  You were deemed stable for discharge home with instructions to schedule outpatient follow up.     Other problem(s)/diagnosis:  Patient Active Problem List:     Other pulmonary embolism without acute cor pulmonale (HCC)     Abdominal pain, left lower quadrant     Retroperitoneal mass     Myelolipoma     Acute deep vein thrombosis (DVT) of popliteal vein of right lower extremity (HCC)     Myelolipoma of left adrenal gland     Acute bronchitis     Presence of inferior vena cava filter     History of deep venous thrombosis (DVT) of distal vein of right lower extremity     History of pulmonary embolism        Surgeries:        IVC filter removal    Important Tests/Procedures:     Fluoroscopy    Instructions for My Care at Home or Healthcare Facility    These instructions explain what you or your care partner need to do to continue your care at home or at another healthcare facility    Please go over these
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
## 2024-07-08 NOTE — OP NOTE
Operative Note      Patient: Raul Cheng  YOB: 1970  MRN: [id number]    Date of Procedure: 7/8/2024    Preoperative Diagnosis:  Presence of inferior vena cava filter    Post-Op Diagnosis: Same       Procedure:  1) US guided vascular access of right internal jugular vein  2) Inferior vena cavogram  3) Percutaneous removal of retrievable inferior vena cava filter    Surgeon(s):  Agus Jamison MD    Assistant: Dr. ANTOLIN Berry    Anesthesia: Monitor Anesthesia Care, local    Estimated Blood Loss (mL): 11 ml    Complications: None    Specimens: filter       Implants: none      Drains: None    Findings:  Infection Present At Time Of Surgery (PATOS) (choose all levels that have infection present):  No infection present  Other Findings: No thrombus or filling defect noted along inferior vena filter or inferior vena cava.    Detailed Description of Procedure:     Raul Cheng was brought to the hybrid catheterization suite for removal of Inferior vena cava filter.  After timeout was performed and appropriate anesthesia delivered, the right neck, chest and groin was prepped and draped in standard sterile fashion.  I began by evaluating the right internal jugular vein with ultrasound, it was patent and compressible.  Local anesthesia delivered, under ultrasound guidance using a micropuncture needle, the right internal jugular vein was accessed, permanent ultrasound images were saved.  Wire was sent down to the inferior vena cava and under fluoroscopy the 10-[place]orran Cook Retrievable System sheath was placed past the filter.  An inferior vena cavogram was performed confirming no thrombus within the filter.  Using the snare, the IVC filter was hooked and snared.  The 8-Andorran inner sheath was used to collapse the filter and was use to remove the filter through the 10-Andorran sheath and removed completely intact.  A completion inferior vena cavogram was performed and revealed patent cava without evidence of

## 2024-07-08 NOTE — ANESTHESIA PRE PROCEDURE
Department of Anesthesiology  Preprocedure Note       Name:  Raul Cheng   Age:  54 y.o.  :  1970                                          MRN:  [id number]         Date:  2024      Surgeon: Surgeon(s):  Agus Jamison MD    Procedure: Procedure(s):  akbani / IVC FILTER REMOVAL    Medications prior to admission:   Prior to Admission medications    Medication Sig Start Date End Date Taking? Authorizing Provider   Multiple Vitamins-Minerals (THERAPEUTIC MULTIVITAMIN-MINERALS) tablet Take 1 tablet by mouth daily    Provider, MD Justin   ELIQUIS 5 MG TABS tablet TAKE ONE TABLET BY MOUTH TWICE A DAY 3/4/24   Daniel Gaming MD       Current medications:    No current facility-administered medications for this encounter.     Current Outpatient Medications   Medication Sig Dispense Refill    Multiple Vitamins-Minerals (THERAPEUTIC MULTIVITAMIN-MINERALS) tablet Take 1 tablet by mouth daily      ELIQUIS 5 MG TABS tablet TAKE ONE TABLET BY MOUTH TWICE A DAY 60 tablet 5     Facility-Administered Medications Ordered in Other Encounters   Medication Dose Route Frequency Provider Last Rate Last Admin    lactated ringers IV soln infusion   IntraVENous Continuous PRN Kelsi Mcginnis APRN - CRNA   New Bag at 23 0906       Allergies:    Allergies   Allergen Reactions    Seasonal Other (See Comments)     congestion       Problem List:    Patient Active Problem List   Diagnosis Code    Other pulmonary embolism without acute cor pulmonale (HCC) I26.99    Abdominal pain, left lower quadrant R10.32    Retroperitoneal mass R19.00    Myelolipoma D17.9    Acute deep vein thrombosis (DVT) of popliteal vein of right lower extremity (HCC) I82.431    Myelolipoma of left adrenal gland D17.79    Acute bronchitis J20.9    Presence of inferior vena cava filter Z95.828    History of deep venous thrombosis (DVT) of distal vein of right lower extremity Z86.718    History of pulmonary embolism Z86.711       Past

## 2024-07-08 NOTE — PROGRESS NOTES
Patient ambulated in hallway. Gait steady while up. No change in assessment while patient up walking.

## 2024-07-08 NOTE — PROGRESS NOTES
Patient returned from surgery. PACU recovery initiated. Patient    sleepy but answers questions.    Band aid intact to right neck area. No drainage or hematoma noted.    Cardiac monitor shows NSR . All vitals WNL.   Side rails up times 2, call  light within reach,    Patient closely monitored

## 2024-07-08 NOTE — ANESTHESIA POSTPROCEDURE EVALUATION
Department of Anesthesiology  Postprocedure Note    Patient: Raul Cheng  MRN: [id number]  YOB: 1970  Date of evaluation: 7/8/2024    Procedure Summary       Date: 07/08/24 Room / Location: [place] [address] / [facility]    Anesthesia Start: 0900 Anesthesia Stop: 0955    Procedure: IVC FILTER REMOVAL (Neck) Diagnosis:       Malposition of inferior vena cava filter (HCC)      (Malposition of inferior vena cava filter (HCC) [T82.525A])    Surgeons: Agus Jamison MD Responsible Provider: Pedro Quiroga MD    Anesthesia Type: MAC ASA Status: 3            Anesthesia Type: No value filed.    Padmini Phase I: Padmini Score: 10    Padmini Phase II:      Anesthesia Post Evaluation    Patient location during evaluation: bedside  Patient participation: complete - patient participated  Level of consciousness: awake  Airway patency: patent  Nausea & Vomiting: no nausea and no vomiting  Cardiovascular status: hemodynamically stable  Respiratory status: acceptable  Hydration status: stable  Comments: BP (!) 145/97   Pulse 71   Temp 97.4 °F (36.3 °C) (Oral)   Resp 17   Ht 1.778 m (5' 10\")   Wt 106.1 kg (234 lb)   SpO2 94%   BMI 33.58 kg/m²       There were no known notable events for this encounter.

## 2024-07-31 ENCOUNTER — OFFICE VISIT (OUTPATIENT)
Dept: INTERNAL MEDICINE CLINIC | Age: 54
End: 2024-07-31

## 2024-07-31 VITALS
BODY MASS INDEX: 33.21 KG/M2 | OXYGEN SATURATION: 93 % | DIASTOLIC BLOOD PRESSURE: 84 MMHG | HEART RATE: 98 BPM | HEIGHT: 70 IN | WEIGHT: 232 LBS | SYSTOLIC BLOOD PRESSURE: 138 MMHG

## 2024-07-31 DIAGNOSIS — J30.2 SEASONAL ALLERGIES: ICD-10-CM

## 2024-07-31 DIAGNOSIS — I82.431 ACUTE DEEP VEIN THROMBOSIS (DVT) OF POPLITEAL VEIN OF RIGHT LOWER EXTREMITY (HCC): ICD-10-CM

## 2024-07-31 DIAGNOSIS — Z13.220 SCREENING FOR HYPERLIPIDEMIA: Primary | ICD-10-CM

## 2024-07-31 DIAGNOSIS — D17.79 MYELOLIPOMA OF LEFT ADRENAL GLAND: ICD-10-CM

## 2024-07-31 RX ORDER — FLUTICASONE PROPIONATE 50 MCG
2 SPRAY, SUSPENSION (ML) NASAL DAILY
Qty: 16 G | Refills: 0 | Status: SHIPPED | OUTPATIENT
Start: 2024-07-31

## 2024-07-31 SDOH — ECONOMIC STABILITY: FOOD INSECURITY: WITHIN THE PAST 12 MONTHS, YOU WORRIED THAT YOUR FOOD WOULD RUN OUT BEFORE YOU GOT MONEY TO BUY MORE.: PATIENT DECLINED

## 2024-07-31 SDOH — ECONOMIC STABILITY: FOOD INSECURITY: WITHIN THE PAST 12 MONTHS, THE FOOD YOU BOUGHT JUST DIDN'T LAST AND YOU DIDN'T HAVE MONEY TO GET MORE.: PATIENT DECLINED

## 2024-07-31 SDOH — ECONOMIC STABILITY: HOUSING INSECURITY
IN THE LAST 12 MONTHS, WAS THERE A TIME WHEN YOU DID NOT HAVE A STEADY PLACE TO SLEEP OR SLEPT IN A SHELTER (INCLUDING NOW)?: PATIENT DECLINED

## 2024-07-31 SDOH — ECONOMIC STABILITY: INCOME INSECURITY: HOW HARD IS IT FOR YOU TO PAY FOR THE VERY BASICS LIKE FOOD, HOUSING, MEDICAL CARE, AND HEATING?: PATIENT DECLINED

## 2024-07-31 ASSESSMENT — PATIENT HEALTH QUESTIONNAIRE - PHQ9
SUM OF ALL RESPONSES TO PHQ QUESTIONS 1-9: 0
2. FEELING DOWN, DEPRESSED OR HOPELESS: NOT AT ALL
SUM OF ALL RESPONSES TO PHQ QUESTIONS 1-9: 0
SUM OF ALL RESPONSES TO PHQ9 QUESTIONS 1 & 2: 0
1. LITTLE INTEREST OR PLEASURE IN DOING THINGS: NOT AT ALL

## 2024-07-31 ASSESSMENT — ENCOUNTER SYMPTOMS
SHORTNESS OF BREATH: 0
BACK PAIN: 0
ABDOMINAL DISTENTION: 0
ABDOMINAL PAIN: 0
COUGH: 0
CHEST TIGHTNESS: 0
WHEEZING: 0
CONSTIPATION: 0
RHINORRHEA: 1
APNEA: 0
FACIAL SWELLING: 0
COLOR CHANGE: 0
DIARRHEA: 0

## 2024-07-31 NOTE — PROGRESS NOTES
Subjective   Patient ID: Raul Cheng is a 54 y.o. male.    HPI-patient is here to establish care, he has history of left adrenal myelo lipoma s/p surgery, patient developed DVT PE chronically on anticoagulation, follows with hematologist  Patient had just recently removed his IVC filter vascular surgery  Compliant with diet and medication  Here to establish care  Only problem he has today is, seasonal allergies, he has nasal congestion sneezing rhinorrhea, postnasal drip, patient is on Mucinex which is not helping  Up-to-date for colonoscopy  Patient grandfather had prostate cancer does not want to get tested for prostate cancer screening  Non-smoker next    Review of Systems   Constitutional:  Negative for activity change, appetite change, chills and diaphoresis.   HENT:  Positive for congestion, postnasal drip and rhinorrhea. Negative for dental problem, ear discharge, facial swelling and hearing loss.         Seasonal allergies    Respiratory:  Negative for apnea, cough, chest tightness, shortness of breath and wheezing.    Cardiovascular:  Negative for chest pain and leg swelling.   Gastrointestinal:  Negative for abdominal distention, abdominal pain, constipation and diarrhea.   Genitourinary:  Negative for difficulty urinating, dysuria, enuresis, flank pain and frequency.   Musculoskeletal:  Negative for arthralgias, back pain, gait problem and joint swelling.   Skin:  Negative for color change, pallor and rash.   Neurological:  Negative for dizziness, seizures, facial asymmetry, light-headedness, numbness and headaches.   Psychiatric/Behavioral:  Negative for agitation, behavioral problems, confusion, decreased concentration and dysphoric mood.           Objective   Physical Exam  Vitals and nursing note reviewed.   Constitutional:       General: He is not in acute distress.     Appearance: He is well-developed. He is obese. He is not diaphoretic.   HENT:      Head: Normocephalic and atraumatic.

## 2024-08-01 ENCOUNTER — HOSPITAL ENCOUNTER (OUTPATIENT)
Age: 54
Setting detail: SPECIMEN
Discharge: HOME | End: 2024-08-01

## 2024-08-01 ENCOUNTER — TELEPHONE (OUTPATIENT)
Dept: INTERNAL MEDICINE CLINIC | Age: 54
End: 2024-08-01

## 2024-08-01 DIAGNOSIS — Z13.220 SCREENING FOR HYPERLIPIDEMIA: ICD-10-CM

## 2024-08-01 LAB
CHOLEST SERPL-MCNC: 206 MG/DL (ref 0–199)
CHOLESTEROL/HDL RATIO: 7
HDLC SERPL-MCNC: 31 MG/DL
LDLC SERPL CALC-MCNC: 151 MG/DL (ref 0–100)
TRIGL SERPL-MCNC: 123 MG/DL
VLDLC SERPL CALC-MCNC: 25 MG/DL

## 2024-08-01 NOTE — TELEPHONE ENCOUNTER
The 10-year ASCVD risk score (Rony RICKETTS, et al., 2019) is: 9.2%    Values used to calculate the score:      Age: 54 years      Sex: Male      Is Non- : No      Diabetic: No      Tobacco smoker: No      Systolic Blood Pressure: 138 mmHg      Is BP treated: No      HDL Cholesterol: 31 mg/dL      Total Cholesterol: 206 mg/dL  Patient cholesterol is high, if he is agreeable, will start patient on Lipitor 40 mg  Please pend the order, if patient is agreeable

## 2024-08-02 NOTE — TELEPHONE ENCOUNTER
Spoke with patient and informed him of results. Patient does not wish to start medication at this time. Advised patient on lifestyle modifications, diet and exercise. Patient agreed to discuss further at next office visit.

## 2024-09-03 DIAGNOSIS — I27.82 OTHER CHRONIC PULMONARY EMBOLISM WITHOUT ACUTE COR PULMONALE (HCC): ICD-10-CM

## 2024-09-04 RX ORDER — APIXABAN 5 MG/1
5 TABLET, FILM COATED ORAL 2 TIMES DAILY
Qty: 60 TABLET | Refills: 5 | Status: SHIPPED | OUTPATIENT
Start: 2024-09-04

## 2024-09-09 ENCOUNTER — OFFICE VISIT (OUTPATIENT)
Dept: ONCOLOGY | Age: 54
End: 2024-09-09
Payer: COMMERCIAL

## 2024-09-09 VITALS
BODY MASS INDEX: 34.34 KG/M2 | HEART RATE: 78 BPM | RESPIRATION RATE: 18 BRPM | TEMPERATURE: 96.8 F | OXYGEN SATURATION: 94 % | DIASTOLIC BLOOD PRESSURE: 79 MMHG | WEIGHT: 239.3 LBS | SYSTOLIC BLOOD PRESSURE: 131 MMHG

## 2024-09-09 DIAGNOSIS — R19.00 RETROPERITONEAL MASS: ICD-10-CM

## 2024-09-09 DIAGNOSIS — D17.9 MYELOLIPOMA: Primary | ICD-10-CM

## 2024-09-09 PROCEDURE — 1036F TOBACCO NON-USER: CPT | Performed by: INTERNAL MEDICINE

## 2024-09-09 PROCEDURE — G8427 DOCREV CUR MEDS BY ELIG CLIN: HCPCS | Performed by: INTERNAL MEDICINE

## 2024-09-09 PROCEDURE — G8417 CALC BMI ABV UP PARAM F/U: HCPCS | Performed by: INTERNAL MEDICINE

## 2024-09-09 PROCEDURE — 99214 OFFICE O/P EST MOD 30 MIN: CPT | Performed by: INTERNAL MEDICINE

## 2024-09-09 PROCEDURE — 99211 OFF/OP EST MAY X REQ PHY/QHP: CPT | Performed by: INTERNAL MEDICINE

## 2024-09-09 PROCEDURE — 3017F COLORECTAL CA SCREEN DOC REV: CPT | Performed by: INTERNAL MEDICINE

## 2024-12-04 ENCOUNTER — OFFICE VISIT (OUTPATIENT)
Dept: INTERNAL MEDICINE CLINIC | Age: 54
End: 2024-12-04
Payer: COMMERCIAL

## 2024-12-04 VITALS
BODY MASS INDEX: 34.79 KG/M2 | DIASTOLIC BLOOD PRESSURE: 82 MMHG | HEART RATE: 89 BPM | OXYGEN SATURATION: 95 % | SYSTOLIC BLOOD PRESSURE: 132 MMHG | HEIGHT: 70 IN | WEIGHT: 243 LBS

## 2024-12-04 DIAGNOSIS — J30.2 SEASONAL ALLERGIES: ICD-10-CM

## 2024-12-04 DIAGNOSIS — J32.9 CHRONIC SINUSITIS, UNSPECIFIED LOCATION: Primary | ICD-10-CM

## 2024-12-04 PROCEDURE — G8427 DOCREV CUR MEDS BY ELIG CLIN: HCPCS | Performed by: INTERNAL MEDICINE

## 2024-12-04 PROCEDURE — G8484 FLU IMMUNIZE NO ADMIN: HCPCS | Performed by: INTERNAL MEDICINE

## 2024-12-04 PROCEDURE — 3017F COLORECTAL CA SCREEN DOC REV: CPT | Performed by: INTERNAL MEDICINE

## 2024-12-04 PROCEDURE — 99213 OFFICE O/P EST LOW 20 MIN: CPT | Performed by: INTERNAL MEDICINE

## 2024-12-04 PROCEDURE — 1036F TOBACCO NON-USER: CPT | Performed by: INTERNAL MEDICINE

## 2024-12-04 PROCEDURE — G8417 CALC BMI ABV UP PARAM F/U: HCPCS | Performed by: INTERNAL MEDICINE

## 2024-12-04 RX ORDER — FLUTICASONE PROPIONATE 50 MCG
2 SPRAY, SUSPENSION (ML) NASAL DAILY
Qty: 16 G | Refills: 0 | Status: SHIPPED | OUTPATIENT
Start: 2024-12-04

## 2024-12-04 RX ORDER — FEXOFENADINE HCL 180 MG/1
180 TABLET ORAL DAILY
Qty: 30 TABLET | Refills: 0 | Status: SHIPPED | OUTPATIENT
Start: 2024-12-04 | End: 2025-01-03
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
## 2025-02-07 NOTE — OR NURSING
Cervical spinal ventral epidural hematoma spans from at least C3-C7, worse at C5-C6 where there is circumferential involvement and suspected severe canal stenosis.     -See above       Dr Annie Key present for biopsies

## 2025-02-08 DIAGNOSIS — J32.9 CHRONIC SINUSITIS, UNSPECIFIED LOCATION: ICD-10-CM

## 2025-02-08 DIAGNOSIS — J30.2 SEASONAL ALLERGIES: ICD-10-CM

## 2025-02-10 RX ORDER — FLUTICASONE PROPIONATE 50 MCG
SPRAY, SUSPENSION (ML) NASAL
Qty: 5 EACH | Refills: 1 | Status: SHIPPED | OUTPATIENT
Start: 2025-02-10

## 2025-03-13 DIAGNOSIS — I27.82 OTHER CHRONIC PULMONARY EMBOLISM WITHOUT ACUTE COR PULMONALE (HCC): ICD-10-CM

## 2025-03-13 RX ORDER — APIXABAN 5 MG/1
5 TABLET, FILM COATED ORAL 2 TIMES DAILY
Qty: 60 TABLET | Refills: 5 | Status: SHIPPED | OUTPATIENT
Start: 2025-03-13

## (undated) DEVICE — CONTAINER,SPECIMEN,4OZ,OR STRL: Brand: MEDLINE

## (undated) DEVICE — INSUFFLATION NEEDLE TO ESTABLISH PNEUMOPERITONEUM.: Brand: INSUFFLATION NEEDLE

## (undated) DEVICE — KIT MICRO INTRO 4FR STIFF 40CM NIGHTENALL TUNGSTEN 7SMT

## (undated) DEVICE — LIQUIBAND RAPID ADHESIVE 36/CS 0.8ML: Brand: MEDLINE

## (undated) DEVICE — BITEBLOCK 54FR W/ DENT RIM BLOX

## (undated) DEVICE — FORCEPS BX L240CM JAW DIA2.4MM ORNG L CAP W/ NDL DISP RAD

## (undated) DEVICE — SURGICAL PROCEDURE TRAY CRD CATH SVMMC

## (undated) DEVICE — MARKER,SKIN,WI/RULER AND LABELS: Brand: MEDLINE

## (undated) DEVICE — DRAPE,UTILITY,XL,4/PK,STERILE: Brand: MEDLINE

## (undated) DEVICE — DRAPE,UNDRBUT,WHT GRAD PCH,CAPPORT,20/CS: Brand: MEDLINE

## (undated) DEVICE — PROVE COVER: Brand: UNBRANDED

## (undated) DEVICE — TOWEL,OR,DSP,ST,NATURAL,DLX,4/PK,20PK/CS: Brand: MEDLINE

## (undated) DEVICE — SHEET, T, LAPAROTOMY, STERILE: Brand: MEDLINE

## (undated) DEVICE — BLADE,CARBON-STEEL,15,STRL,DISPOSABLE,TB: Brand: MEDLINE

## (undated) DEVICE — AGENT HEMSTAT 3GM OXIDIZED REGENERATED CELOS ABSRB FOR CONT (ORDER MULTIPLES OF 5EA)

## (undated) DEVICE — CATHETER URET 5FR L70CM OPN END SGL LUMN INJ HUB FLEXIMA

## (undated) DEVICE — 4-PORT MANIFOLD: Brand: NEPTUNE 2

## (undated) DEVICE — STRAP,POSITIONING,KNEE/BODY,FOAM,4X60": Brand: MEDLINE

## (undated) DEVICE — ELECTRODE PT RET AD L9FT HI MOIST COND ADH HYDRGEL CORDED

## (undated) DEVICE — TIP COVER ACCESSORY

## (undated) DEVICE — SYRINGE, LUER LOCK, 10ML: Brand: MEDLINE

## (undated) DEVICE — GLOVE SURG SZ 8 CRM LTX FREE POLYISOPRENE POLYMER BEAD ANTI

## (undated) DEVICE — ANCHOR TISSUE RETRIEVAL SYSTEM, BAG SIZE 125 ML, PORT SIZE 8 MM: Brand: ANCHOR TISSUE RETRIEVAL SYSTEM

## (undated) DEVICE — PACK PROCEDURE SURG CYSTO SVMMC LF

## (undated) DEVICE — DECANTER BAG 9": Brand: MEDLINE INDUSTRIES, INC.

## (undated) DEVICE — ARM DRAPE

## (undated) DEVICE — CLOVERSNARE 4-LOOP VASCULAR RETRIEVER: Brand: CLOVERSNARE

## (undated) DEVICE — SET PEN AND LBL AND L BWL LBL PAL PEN AND LBLS PAL700]

## (undated) DEVICE — APPLICATOR MEDICATED 26 CC SOLUTION HI LT ORNG CHLORAPREP

## (undated) DEVICE — GOWN,AURORA,NONREINFORCED,LARGE: Brand: MEDLINE

## (undated) DEVICE — GOWN,SIRUS,NONRNF,SETINSLV,XL,20/CS: Brand: MEDLINE

## (undated) DEVICE — STAPLER INT L16CM STD UNIV RELD DISP TRI-STAPLE ENDO GIA

## (undated) DEVICE — STRAP ARMBRD W1.5XL32IN FOAM STR YET SFT W/ HK AND LOOP

## (undated) DEVICE — PACK PROCEDURE SURG ROBOTIC KID SVMMC

## (undated) DEVICE — COUNTER NDL 10 COUNT HLD 20 FOAM BLK SGL MAG

## (undated) DEVICE — SYRINGE 20ML LL S/C 50

## (undated) DEVICE — AIRSEAL 8 MM CANNULA CAP AND OBTURATOR WITH BLADELESS OPTICAL TIP COMPATIBLE WITH INTUITIVE DA VINCI XI AND DA VINCI X 8 MM INSTRUMENT CANNULA, STANDARD LENGTH: Brand: AIRSEAL

## (undated) DEVICE — GAUZE,SPONGE,4"X4",16PLY,XRAY,STRL,LF: Brand: MEDLINE

## (undated) DEVICE — PROTECTOR ULN NRV PUR FOAM HK LOOP STRP ANATOMICALLY

## (undated) DEVICE — BLADE,CARBON-STEEL,10,STRL,DISPOSABLE,TB: Brand: MEDLINE

## (undated) DEVICE — ADAPTER URO SCP UROLOK LL

## (undated) DEVICE — COVER,LIGHT HANDLE,FLX,2/PK: Brand: MEDLINE INDUSTRIES, INC.

## (undated) DEVICE — BLADE CLIPPER GEN PURP NS

## (undated) DEVICE — SYSTEM PMP SGL ACT W/ 10CC VAC SYR 1 W VLV FOR ENDOSCP SURG

## (undated) DEVICE — LEGGINGS, PAIR, 31X48, STERILE: Brand: MEDLINE

## (undated) DEVICE — DUAL LUMEN URETERAL CATHETER

## (undated) DEVICE — Device: Brand: DEFENDO VALVE AND CONNECTOR KIT

## (undated) DEVICE — SINGLE-USE DIGITAL FLEXIBLE URETEROSCOPE: Brand: LITHOVUE

## (undated) DEVICE — SCISSOR SURG METZ CRV TIP

## (undated) DEVICE — BLADELESS OBTURATOR: Brand: WECK VISTA

## (undated) DEVICE — PERRYSBURG ENDO PACK: Brand: MEDLINE INDUSTRIES, INC.

## (undated) DEVICE — SURGICEL ENDOSCP APPL

## (undated) DEVICE — TAPE UMBILICAL W1/16XL30IN COTTON ROUND NONRADIOPAQUE

## (undated) DEVICE — SOLUTION ANTIFOG VIS SYS CLEARIFY LAPSCP

## (undated) DEVICE — AIRSEAL BIFURCATED FILTERED TUBESET WITH ACTIVATED CHARCOAL FILTER: Brand: AIRSEAL

## (undated) DEVICE — 1LYRTR 16FR10ML100%SIL UMS SNP: Brand: MEDLINE INDUSTRIES, INC.

## (undated) DEVICE — GLOVE SURG SZ 6 THK91MIL LTX FREE SYN POLYISOPRENE ANTI

## (undated) DEVICE — TROCAR: Brand: KII FIOS FIRST ENTRY

## (undated) DEVICE — GLOVE ORANGE PI 7 1/2   MSG9075

## (undated) DEVICE — SEAL

## (undated) DEVICE — GUIDEWIRE URO L150CM DIA .035IN STIFF NIT HYDRPHL STR TIP

## (undated) DEVICE — TROCAR ENDOSCP L100MM DIA5MM BLDELSS STBL SL THRD OPT VW